# Patient Record
Sex: MALE | Race: WHITE | Employment: OTHER | URBAN - METROPOLITAN AREA
[De-identification: names, ages, dates, MRNs, and addresses within clinical notes are randomized per-mention and may not be internally consistent; named-entity substitution may affect disease eponyms.]

---

## 2017-05-10 PROBLEM — I25.5 ISCHEMIC CARDIOMYOPATHY: Status: ACTIVE | Noted: 2017-05-10

## 2017-07-20 ENCOUNTER — HOSPITAL ENCOUNTER (OUTPATIENT)
Dept: SURGERY | Age: 72
Discharge: HOME | End: 2017-07-20

## 2017-07-20 VITALS
HEIGHT: 70 IN | TEMPERATURE: 97.7 F | WEIGHT: 166 LBS | DIASTOLIC BLOOD PRESSURE: 84 MMHG | RESPIRATION RATE: 16 BRPM | BODY MASS INDEX: 23.77 KG/M2 | OXYGEN SATURATION: 100 % | SYSTOLIC BLOOD PRESSURE: 146 MMHG | HEART RATE: 60 BPM

## 2017-07-26 ENCOUNTER — ANESTHESIA EVENT (OUTPATIENT)
Dept: SURGERY | Age: 72
End: 2017-07-26
Payer: MEDICARE

## 2017-07-27 ENCOUNTER — ANESTHESIA (OUTPATIENT)
Dept: SURGERY | Age: 72
End: 2017-07-27
Payer: MEDICARE

## 2017-07-27 ENCOUNTER — HOSPITAL ENCOUNTER (OUTPATIENT)
Age: 72
Setting detail: OUTPATIENT SURGERY
Discharge: HOME | End: 2017-07-27
Attending: OPHTHALMOLOGY | Admitting: OPHTHALMOLOGY
Payer: MEDICARE

## 2017-07-27 VITALS
BODY MASS INDEX: 23.41 KG/M2 | OXYGEN SATURATION: 97 % | TEMPERATURE: 98.4 F | RESPIRATION RATE: 15 BRPM | SYSTOLIC BLOOD PRESSURE: 128 MMHG | HEART RATE: 58 BPM | DIASTOLIC BLOOD PRESSURE: 72 MMHG | WEIGHT: 163.13 LBS

## 2017-07-27 PROCEDURE — 76210000063 HC OR PH I REC FIRST 0.5 HR: Performed by: OPHTHALMOLOGY

## 2017-07-27 PROCEDURE — 74011000250 HC RX REV CODE- 250

## 2017-07-27 PROCEDURE — 76060000032 HC ANESTHESIA 0.5 TO 1 HR: Performed by: OPHTHALMOLOGY

## 2017-07-27 PROCEDURE — 77030018838 HC SOL IRR OPTH ALCN -B: Performed by: OPHTHALMOLOGY

## 2017-07-27 PROCEDURE — 77030026083 HC FCPS OPHTH GRSH DSP ALCN -C: Performed by: OPHTHALMOLOGY

## 2017-07-27 PROCEDURE — 77030006685 HC BLD OPHTH ALCN -B: Performed by: OPHTHALMOLOGY

## 2017-07-27 PROCEDURE — 76210000020 HC REC RM PH II FIRST 0.5 HR: Performed by: OPHTHALMOLOGY

## 2017-07-27 PROCEDURE — 76010000138 HC OR TIME 0.5 TO 1 HR: Performed by: OPHTHALMOLOGY

## 2017-07-27 PROCEDURE — 74011250636 HC RX REV CODE- 250/636

## 2017-07-27 PROCEDURE — 77030013372: Performed by: OPHTHALMOLOGY

## 2017-07-27 PROCEDURE — 74011000254 HC RX REV CODE- 254: Performed by: OPHTHALMOLOGY

## 2017-07-27 PROCEDURE — 77030018837 HC SOL IRR OPTH ALCN -A: Performed by: OPHTHALMOLOGY

## 2017-07-27 PROCEDURE — 74011250636 HC RX REV CODE- 250/636: Performed by: OPHTHALMOLOGY

## 2017-07-27 PROCEDURE — 74011250636 HC RX REV CODE- 250/636: Performed by: ANESTHESIOLOGY

## 2017-07-27 PROCEDURE — 74011000250 HC RX REV CODE- 250: Performed by: OPHTHALMOLOGY

## 2017-07-27 PROCEDURE — 77030032623 HC KT VITRCMY TOT PLS ALCN -F: Performed by: OPHTHALMOLOGY

## 2017-07-27 PROCEDURE — 77030008547 HC TBNG OPHTH BD -A: Performed by: OPHTHALMOLOGY

## 2017-07-27 PROCEDURE — 77030018846 HC SOL IRR STRL H20 ICUM -A: Performed by: OPHTHALMOLOGY

## 2017-07-27 RX ORDER — TETRACAINE HYDROCHLORIDE 5 MG/ML
SOLUTION OPHTHALMIC AS NEEDED
Status: DISCONTINUED | OUTPATIENT
Start: 2017-07-27 | End: 2017-07-27 | Stop reason: HOSPADM

## 2017-07-27 RX ORDER — BUPIVACAINE HYDROCHLORIDE 5 MG/ML
INJECTION, SOLUTION EPIDURAL; INTRACAUDAL AS NEEDED
Status: DISCONTINUED | OUTPATIENT
Start: 2017-07-27 | End: 2017-07-27 | Stop reason: HOSPADM

## 2017-07-27 RX ORDER — NALOXONE HYDROCHLORIDE 0.4 MG/ML
0.2 INJECTION, SOLUTION INTRAMUSCULAR; INTRAVENOUS; SUBCUTANEOUS AS NEEDED
Status: DISCONTINUED | OUTPATIENT
Start: 2017-07-27 | End: 2017-07-27 | Stop reason: HOSPADM

## 2017-07-27 RX ORDER — LIDOCAINE HYDROCHLORIDE 20 MG/ML
INJECTION, SOLUTION EPIDURAL; INFILTRATION; INTRACAUDAL; PERINEURAL AS NEEDED
Status: DISCONTINUED | OUTPATIENT
Start: 2017-07-27 | End: 2017-07-27 | Stop reason: HOSPADM

## 2017-07-27 RX ORDER — ONDANSETRON 2 MG/ML
INJECTION INTRAMUSCULAR; INTRAVENOUS AS NEEDED
Status: DISCONTINUED | OUTPATIENT
Start: 2017-07-27 | End: 2017-07-27 | Stop reason: HOSPADM

## 2017-07-27 RX ORDER — SODIUM CHLORIDE 9 MG/ML
INJECTION INTRAMUSCULAR; INTRAVENOUS; SUBCUTANEOUS AS NEEDED
Status: DISCONTINUED | OUTPATIENT
Start: 2017-07-27 | End: 2017-07-27 | Stop reason: HOSPADM

## 2017-07-27 RX ORDER — INDOCYANINE GREEN AND WATER 25 MG
KIT INJECTION AS NEEDED
Status: DISCONTINUED | OUTPATIENT
Start: 2017-07-27 | End: 2017-07-27 | Stop reason: HOSPADM

## 2017-07-27 RX ORDER — OXYCODONE HYDROCHLORIDE 5 MG/1
5 TABLET ORAL
Status: DISCONTINUED | OUTPATIENT
Start: 2017-07-27 | End: 2017-07-27 | Stop reason: HOSPADM

## 2017-07-27 RX ORDER — PHENYLEPHRINE HYDROCHLORIDE 25 MG/ML
1 SOLUTION/ DROPS OPHTHALMIC
Status: DISCONTINUED | OUTPATIENT
Start: 2017-07-27 | End: 2017-07-27 | Stop reason: HOSPADM

## 2017-07-27 RX ORDER — HYDROMORPHONE HYDROCHLORIDE 2 MG/ML
0.5 INJECTION, SOLUTION INTRAMUSCULAR; INTRAVENOUS; SUBCUTANEOUS
Status: DISCONTINUED | OUTPATIENT
Start: 2017-07-27 | End: 2017-07-27 | Stop reason: HOSPADM

## 2017-07-27 RX ORDER — SODIUM CHLORIDE, SODIUM LACTATE, POTASSIUM CHLORIDE, CALCIUM CHLORIDE 600; 310; 30; 20 MG/100ML; MG/100ML; MG/100ML; MG/100ML
75 INJECTION, SOLUTION INTRAVENOUS CONTINUOUS
Status: DISCONTINUED | OUTPATIENT
Start: 2017-07-27 | End: 2017-07-27 | Stop reason: HOSPADM

## 2017-07-27 RX ORDER — LIDOCAINE HYDROCHLORIDE 20 MG/ML
INJECTION, SOLUTION INFILTRATION; PERINEURAL AS NEEDED
Status: DISCONTINUED | OUTPATIENT
Start: 2017-07-27 | End: 2017-07-27 | Stop reason: HOSPADM

## 2017-07-27 RX ORDER — MIDAZOLAM HYDROCHLORIDE 1 MG/ML
2 INJECTION, SOLUTION INTRAMUSCULAR; INTRAVENOUS ONCE
Status: DISCONTINUED | OUTPATIENT
Start: 2017-07-27 | End: 2017-07-27 | Stop reason: HOSPADM

## 2017-07-27 RX ORDER — PROPOFOL 10 MG/ML
INJECTION, EMULSION INTRAVENOUS AS NEEDED
Status: DISCONTINUED | OUTPATIENT
Start: 2017-07-27 | End: 2017-07-27 | Stop reason: HOSPADM

## 2017-07-27 RX ORDER — CYCLOPENTOLATE HYDROCHLORIDE 20 MG/ML
1 SOLUTION/ DROPS OPHTHALMIC
Status: DISCONTINUED | OUTPATIENT
Start: 2017-07-27 | End: 2017-07-27 | Stop reason: HOSPADM

## 2017-07-27 RX ORDER — OFLOXACIN 3 MG/ML
1 SOLUTION/ DROPS OPHTHALMIC
Status: DISCONTINUED | OUTPATIENT
Start: 2017-07-27 | End: 2017-07-27 | Stop reason: HOSPADM

## 2017-07-27 RX ORDER — MIDAZOLAM HYDROCHLORIDE 1 MG/ML
2 INJECTION, SOLUTION INTRAMUSCULAR; INTRAVENOUS
Status: DISCONTINUED | OUTPATIENT
Start: 2017-07-27 | End: 2017-07-27 | Stop reason: HOSPADM

## 2017-07-27 RX ORDER — FENTANYL CITRATE 50 UG/ML
100 INJECTION, SOLUTION INTRAMUSCULAR; INTRAVENOUS ONCE
Status: DISCONTINUED | OUTPATIENT
Start: 2017-07-27 | End: 2017-07-27 | Stop reason: HOSPADM

## 2017-07-27 RX ORDER — BETAMETHASONE SODIUM PHOSPHATE AND BETAMETHASONE ACETATE 3; 3 MG/ML; MG/ML
INJECTION, SUSPENSION INTRA-ARTICULAR; INTRALESIONAL; INTRAMUSCULAR; SOFT TISSUE AS NEEDED
Status: DISCONTINUED | OUTPATIENT
Start: 2017-07-27 | End: 2017-07-27 | Stop reason: HOSPADM

## 2017-07-27 RX ORDER — LIDOCAINE HYDROCHLORIDE 10 MG/ML
0.1 INJECTION INFILTRATION; PERINEURAL AS NEEDED
Status: DISCONTINUED | OUTPATIENT
Start: 2017-07-27 | End: 2017-07-27 | Stop reason: HOSPADM

## 2017-07-27 RX ORDER — CEFAZOLIN SODIUM 1 G/3ML
INJECTION, POWDER, FOR SOLUTION INTRAMUSCULAR; INTRAVENOUS AS NEEDED
Status: DISCONTINUED | OUTPATIENT
Start: 2017-07-27 | End: 2017-07-27 | Stop reason: HOSPADM

## 2017-07-27 RX ADMIN — PROPOFOL 20 MG: 10 INJECTION, EMULSION INTRAVENOUS at 13:19

## 2017-07-27 RX ADMIN — CYCLOPENTOLATE HYDROCHLORIDE 1 DROP: 20 SOLUTION/ DROPS OPHTHALMIC at 10:52

## 2017-07-27 RX ADMIN — SODIUM CHLORIDE, SODIUM LACTATE, POTASSIUM CHLORIDE, AND CALCIUM CHLORIDE 75 ML/HR: 600; 310; 30; 20 INJECTION, SOLUTION INTRAVENOUS at 10:53

## 2017-07-27 RX ADMIN — ONDANSETRON 4 MG: 2 INJECTION INTRAMUSCULAR; INTRAVENOUS at 13:26

## 2017-07-27 RX ADMIN — PROPOFOL 30 MG: 10 INJECTION, EMULSION INTRAVENOUS at 13:17

## 2017-07-27 RX ADMIN — PHENYLEPHRINE HYDROCHLORIDE 1 DROP: 25 SOLUTION/ DROPS OPHTHALMIC at 10:52

## 2017-07-27 RX ADMIN — PROPOFOL 20 MG: 10 INJECTION, EMULSION INTRAVENOUS at 13:18

## 2017-07-27 RX ADMIN — LIDOCAINE HYDROCHLORIDE 20 MG: 20 INJECTION, SOLUTION EPIDURAL; INFILTRATION; INTRACAUDAL; PERINEURAL at 13:17

## 2017-07-27 NOTE — BRIEF OP NOTE
BRIEF OPERATIVE NOTE    Date of Procedure: 7/27/2017   Preoperative Diagnosis: Macular pucker, right [H35.371]  Postoperative Diagnosis: Macular pucker, right [H35.371]    Procedure(s):  RIGHT VITRECTOMY POSTERIOR 25 GAUGE/ SCAR TISSUE REMOVAL  Surgeon(s) and Role:     * Batsheva Castro MD - Primary         Assistant Staff:       Surgical Staff:  Circ-1: Raven Sarkar RN  Scrub Tech-2: Shivani Blake  Event Time In   Incision Start 1318   Incision Close 1357     Anesthesia: MAC   Estimated Blood Loss: 0  Specimens: * No specimens in log *   Findings: 0   Complications: 0  Implants: * No implants in log *

## 2017-07-27 NOTE — IP AVS SNAPSHOT
[address] [facility] 
 
 
 [address] 
[phone] Patient: Monserrat Griffith MRN: [id number] XTZ:4/32/2807 Current Discharge Medication List  
  
CONTINUE these medications which have CHANGED Dose & Instructions Dispensing Information Comments Morning Noon Evening Bedtime  
 atorvastatin 40 mg tablet Commonly known as:  LIPITOR What changed:  when to take this Your last dose was: Your next dose is:    
   
   
 Dose:  40 mg Take 1 Tab by mouth daily. Quantity:  90 Tab Refills:  3  
     
   
   
   
  
 losartan 25 mg tablet Commonly known as:  COZAAR What changed:  when to take this Your last dose was: Your next dose is:    
   
   
 Dose:  25 mg Take 1 Tab by mouth daily. Quantity:  90 Tab Refills:  3 CONTINUE these medications which have NOT CHANGED Dose & Instructions Dispensing Information Comments Morning Noon Evening Bedtime  
 aspirin delayed-release 81 mg tablet Your last dose was: Your next dose is:    
   
   
 Dose:  81 mg Take 81 mg by mouth every morning. Indications: continue Refills:  0  
     
   
   
   
  
 leflunomide 20 mg tablet Commonly known as:  Zaira Gallon Your last dose was: Your next dose is:    
   
   
 Dose:  20 mg Take 20 mg by mouth every morning. Indications: RHEUMATOID ARTHRITIS Refills:  0  
     
   
   
   
  
 metoprolol tartrate 25 mg tablet Commonly known as:  LOPRESSOR Your last dose was: Your next dose is:    
   
   
 Dose:  25 mg Take 1 Tab by mouth two (2) times a day. Quantity:  180 Tab Refills:  3  
     
   
   
   
  
 multivitamin tablet Commonly known as:  ONE A DAY Your last dose was: Your next dose is:    
   
   
 Dose:  1 Tab Take 1 Tab by mouth daily. Indications: hold until after surgery Refills:  0 nitroglycerin 0.4 mg SL tablet Commonly known as:  NITROSTAT Your last dose was: Your next dose is:    
   
   
 Dose:  0.4 mg  
1 Tab by SubLINGual route every five (5) minutes as needed for Chest Pain. Indications: bring on the dos. Quantity:  1 Bottle Refills:  11

## 2017-07-27 NOTE — OP NOTES
[facility] 64   OPERATIVE REPORT       Name:  Eliana Louis   MR#:  [id number]   :  1945   Account #:  [de-identified]   Date of Adm:  2017       DATE OF SURGERY: 2017    PREOPERATIVE DIAGNOSES   1. Epiretinal proliferation, visually significant, right eye. 2. Pseudophakia, right eye. POSTOPERATIVE DIAGNOSES   1. Epiretinal proliferation, visually significant, right eye. 2. Pseudophakia, right eye. PROCEDURE PERFORMED: Pars plana vitrectomy with stripping of   epiretinal proliferation, right eye. SURGEON: Yobani Freitas MD    ANESTHESIA: Modified. COMPLICATIONS: None. INDICATIONS FOR PROCEDURE: The patient has significant central   visual distortion and blur associated with a dense epiretinal   proliferation. Significant distortion was seen on the OCT   testing. He had been followed for a time and came in, wanting   something done for this. Understanding the risks, benefits, and   alternatives, he elected to proceed. SUMMARY OF THE OPERATION: After adequate pre-evaluation and   informed consent had been obtained, the patient was brought to   the suite. IV access and sedation were obtained without   complication. Modified Jerzy Millin and retrobulbar injection was   given to the right eye after verifying this as the operative   site and after a timeout had been performed. Once good anesthesia and akinesia were noted, the eye was   prepped and draped in the usual fashion for vitreoretinal   surgery. Lid speculum was placed in the floor of the eye. He   still had some movement and so I did place 3 mL of additional   sub-Tenon anesthetic inferonasally. Three separate 25-gauge   cannulas were then placed. Infusion was verified and turned to a   pressure of 35 mmHg. Through the 2 working sclerotomies, central   vitreous was removed. Once the vitreous removal had been carried   out into the anterior skirt, attention was turned to the macula.    A bent MVR blade was used to elevate the proliferation along the   infratemporal arcade vessel which was very undulating. Good   elevation of this membrane was achieved through the   papillomacular bundle then was stripped over the entire macula,   especially temporal to the papillomacular bundle, including the   fovea without significant evidence of retinal trauma. Some   surface proliferation remained in the PMB and inferior to the   arcade vessel. This was then removed as well. ICG dye showed   several small remnants. These were peeled and trimmed. No   significant scar remained at the end of this. I did not see any   significant ILM that required peel. I felt that this was   actually part of the epiretinal proliferation 0elevated, due to   the staining and appearance of the membrane. The periphery was   inspected. No breaks or tears were seen. It was felt this was an   excellent result. The pressure was turned to 20 and all cannulas   were removed. The eye remained pressurized at 20 mmHg;   therefore, subconjunctival Celestone and Ancef were given in the   sub-Tenon space.         MD Tima Dimas / Brenda Perry   D:  07/27/2017   14:08   T:  07/27/2017   14:29   Job #:  [id number]

## 2017-07-27 NOTE — IP AVS SNAPSHOT
[facility] 
 
 
 [address] [facility] 322 [facility] 
[phone] Patient: Fili Elkins MRN: [id number] NZL:6/00/3215 You are allergic to the following Allergen Reactions Equine Protein Unknown (comments) \"horse serum\" \"found in td\" Sulfa (Sulfonamide Antibiotics) Swelling Recent Documentation Weight BMI Smoking Status 74 kg 23.41 kg/m2 Never Smoker Emergency Contacts Name Discharge Info Relation Home Work Mobile Sharon Mcadams DISCHARGE CAREGIVER [3] Spouse [3] [phone] About your hospitalization You were admitted on:  July 27, 2017 You last received care in the:  [facility] OP PACU You were discharged on:  July 27, 2017 Unit phone number:  [phone] Why you were hospitalized Your primary diagnosis was:  Not on File Providers Seen During Your Hospitalizations Provider Role Specialty Primary office phone Todd Reyes MD Attending Provider Ophthalmology [phone] Your Primary Care Physician (PCP) Primary Care Physician Office Phone Office Fax Mae Ewing [phone] Follow-up Information Follow up With Details Comments Contact Info Diana Jaime MD   [address] [place] [address] Internal Medicine [facility] [address] 
[phone] Current Discharge Medication List  
  
CONTINUE these medications which have CHANGED Dose & Instructions Dispensing Information Comments Morning Noon Evening Bedtime  
 atorvastatin 40 mg tablet Commonly known as:  LIPITOR What changed:  when to take this Your last dose was: Your next dose is:    
   
   
 Dose:  40 mg Take 1 Tab by mouth daily. Quantity:  90 Tab Refills:  3  
     
   
   
   
  
 losartan 25 mg tablet Commonly known as:  COZAAR What changed:  when to take this Your last dose was: Your next dose is:    
   
   
 Dose:  25 mg Take 1 Tab by mouth daily. Quantity:  90 Tab Refills:  3 CONTINUE these medications which have NOT CHANGED Dose & Instructions Dispensing Information Comments Morning Noon Evening Bedtime  
 aspirin delayed-release 81 mg tablet Your last dose was: Your next dose is:    
   
   
 Dose:  81 mg Take 81 mg by mouth every morning. Indications: continue Refills:  0  
     
   
   
   
  
 leflunomide 20 mg tablet Commonly known as:  Adrian Montecito Your last dose was: Your next dose is:    
   
   
 Dose:  20 mg Take 20 mg by mouth every morning. Indications: RHEUMATOID ARTHRITIS Refills:  0  
     
   
   
   
  
 metoprolol tartrate 25 mg tablet Commonly known as:  LOPRESSOR Your last dose was: Your next dose is:    
   
   
 Dose:  25 mg Take 1 Tab by mouth two (2) times a day. Quantity:  180 Tab Refills:  3  
     
   
   
   
  
 multivitamin tablet Commonly known as:  ONE A DAY Your last dose was: Your next dose is:    
   
   
 Dose:  1 Tab Take 1 Tab by mouth daily. Indications: hold until after surgery Refills:  0  
     
   
   
   
  
 nitroglycerin 0.4 mg SL tablet Commonly known as:  NITROSTAT Your last dose was: Your next dose is:    
   
   
 Dose:  0.4 mg  
1 Tab by SubLINGual route every five (5) minutes as needed for Chest Pain. Indications: bring on the dos. Quantity:  1 Bottle Refills:  11 Discharge Instructions ACTIVITY · As tolerated and as directed by your doctor. · Bathe or shower as directed by your doctor. DIET · Clear liquids until no nausea or vomiting; then light diet for the first day. · Advance to regular diet on second day, unless your doctor orders otherwise. · If nausea and vomiting continues, call your doctor.   
 
PAIN 
 · Take pain medication as directed by your doctor. · Call your doctor if pain is NOT relieved by medication. · DO NOT take aspirin of blood thinners unless directed by your doctor. DRESSING CARE  
 
 
CALL YOUR DOCTOR IF  
· Excessive bleeding that does not stop after holding pressure over the area · Temperature of 101 degrees F or above · Excessive redness, swelling or bruising, and/ or green or yellow, smelly discharge from incision AFTER ANESTHESIA · For the first 24 hours: DO NOT Drive, Drink alcoholic beverages, or Make important decisions. · Be aware of dizziness following anesthesia and while taking pain medication. APPOINTMENT DATE/ TIME 
 
YOUR DOCTOR'S PHONE NUMBER  
 
 
DISCHARGE SUMMARY from Nurse PATIENT INSTRUCTIONS: 
 
After general anesthesia or intravenous sedation, for 24 hours or while taking prescription Narcotics: · Limit your activities · Do not drive and operate hazardous machinery · Do not make important personal or business decisions · Do  not drink alcoholic beverages · If you have not urinated within 8 hours after discharge, please contact your surgeon on call. *  Please give a list of your current medications to your Primary Care Provider. *  Please update this list whenever your medications are discontinued, doses are 
    changed, or new medications (including over-the-counter products) are added. *  Please carry medication information at all times in case of emergency situations. These are general instructions for a healthy lifestyle: No smoking/ No tobacco products/ Avoid exposure to second hand smoke Surgeon General's Warning:  Quitting smoking now greatly reduces serious risk to your health. Obesity, smoking, and sedentary lifestyle greatly increases your risk for illness A healthy diet, regular physical exercise & weight monitoring are important for maintaining a healthy lifestyle You may be retaining fluid if you have a history of heart failure or if you experience any of the following symptoms:  Weight gain of 3 pounds or more overnight or 5 pounds in a week, increased swelling in our hands or feet or shortness of breath while lying flat in bed. Please call your doctor as soon as you notice any of these symptoms; do not wait until your next office visit. Recognize signs and symptoms of STROKE: 
 
F-face looks uneven A-arms unable to move or move unevenly S-speech slurred or non-existent T-time-call 911 as soon as signs and symptoms begin-DO NOT go Back to bed or wait to see if you get better-TIME IS BRAIN. Discharge Orders None Introducing [facility] & HEALTH SERVICES! [facility] introduces [employer] patient portal. Now you can access parts of your medical record, email your doctor's office, and request medication refills online. 1. In your internet browser, go to [email or web address] 2. Click on the First Time User? Click Here link in the Sign In box. You will see the New Member Sign Up page. 3. Enter your [employer] Access Code exactly as it appears below. You will not need to use this code after youve completed the sign-up process. If you do not sign up before the expiration date, you must request a new code. · [employer] Access Code: [email or web address] Expires: 8/8/2017  8:17 AM 
 
4. Enter the last four digits of your Social Security Number (xxxx) and Date of Birth (mm/dd/yyyy) as indicated and click Submit. You will be taken to the next sign-up page. 5. Create a [employer] ID. This will be your [employer] login ID and cannot be changed, so think of one that is secure and easy to remember. 6. Create a [employer] password. You can change your password at any time. 7. Enter your Password Reset Question and Answer. This can be used at a later time if you forget your password. 8. Enter your e-mail address.  You will receive e-mail notification when new information is available in [employer]. 9. Click Sign Up. You can now view and download portions of your medical record. 10. Click the Download Summary menu link to download a portable copy of your medical information. If you have questions, please visit the Frequently Asked Questions section of the [employer] website. Remember, [employer] is NOT to be used for urgent needs. For medical emergencies, dial 911. Now available from your iPhone and Android! General Information Please provide this summary of care documentation to your next provider. Patient Signature:  ____________________________________________________________ Date:  ____________________________________________________________  
  
[facility] Provider Signature:  ____________________________________________________________ Date:  ____________________________________________________________

## 2017-07-27 NOTE — ANESTHESIA POSTPROCEDURE EVALUATION
Post-Anesthesia Evaluation and Assessment    Patient: Veronique Torres MRN: [id number]  SSN: [id number]    YOB: 1945  Age: 67 y.o. Sex: male       Cardiovascular Function/Vital Signs  Visit Vitals    /77    Pulse (!) 41    Temp 36.9 °C (98.4 °F)    Resp 26    Wt 74 kg (163 lb 2 oz)    SpO2 97%    BMI 23.41 kg/m2       Patient is status post total IV anesthesia anesthesia for Procedure(s):  RIGHT VITRECTOMY POSTERIOR 25 GAUGE/ SCAR TISSUE REMOVAL. Nausea/Vomiting: None    Postoperative hydration reviewed and adequate. Pain:  Pain Scale 1: Numeric (0 - 10) (07/27/17 1058)  Pain Intensity 1: 0 (07/27/17 1058)   Managed    Neurological Status:   Neuro (WDL): Within Defined Limits (07/27/17 1100)   At baseline    Mental Status and Level of Consciousness: Arousable    Pulmonary Status:   O2 Device: Room air (07/27/17 1403)   Adequate oxygenation and airway patent    Complications related to anesthesia: None    Post-anesthesia assessment completed. No concerns. Pt doing well.      Signed By: Lux Fraga MD     July 27, 2017

## 2017-07-27 NOTE — ANESTHESIA PREPROCEDURE EVALUATION
Anesthetic History   No history of anesthetic complications            Review of Systems / Medical History  Patient summary reviewed and pertinent labs reviewed    Pulmonary        Sleep apnea: BiPAP           Neuro/Psych              Cardiovascular    Hypertension: well controlled          CAD, CABG (2014) and hyperlipidemia    Exercise tolerance: >4 METS  Comments: Denies CP, SOB or changes in functional status  EF 40-45%      GI/Hepatic/Renal     GERD: well controlled           Endo/Other      Hypothyroidism: well controlled  Arthritis     Other Findings              Physical Exam    Airway  Mallampati: II  TM Distance: 4 - 6 cm  Neck ROM: normal range of motion   Mouth opening: Normal     Cardiovascular    Rhythm: regular  Rate: normal         Dental    Dentition: Caps/crowns     Pulmonary  Breath sounds clear to auscultation               Abdominal  GI exam deferred       Other Findings            Anesthetic Plan    ASA: 3  Anesthesia type: total IV anesthesia          Induction: Intravenous  Anesthetic plan and risks discussed with: Patient and Spouse      Pt took ASA and Metoprolol today.

## 2017-11-08 ENCOUNTER — HOSPITAL ENCOUNTER (OUTPATIENT)
Dept: LAB | Age: 72
Discharge: HOME | End: 2017-11-08
Payer: MEDICARE

## 2017-11-08 DIAGNOSIS — E78.2 MIXED HYPERLIPIDEMIA: Chronic | ICD-10-CM

## 2017-11-08 LAB
ALBUMIN SERPL-MCNC: 3.7 G/DL (ref 3.2–4.6)
ALBUMIN/GLOB SERPL: 0.9 {RATIO}
ALP SERPL-CCNC: 54 U/L (ref 50–136)
ALT SERPL-CCNC: 33 U/L (ref 12–65)
AST SERPL-CCNC: 25 U/L (ref 15–37)
BILIRUB DIRECT SERPL-MCNC: 0.2 MG/DL
BILIRUB SERPL-MCNC: 0.7 MG/DL (ref 0.2–1.1)
CHOLEST SERPL-MCNC: 140 MG/DL
GLOBULIN SER CALC-MCNC: 4 G/DL
HDLC SERPL-MCNC: 54 MG/DL (ref 40–60)
HDLC SERPL: 2.6 {RATIO}
LDLC SERPL CALC-MCNC: 73.2 MG/DL
LIPID PROFILE,FLP: NORMAL
PROT SERPL-MCNC: 7.7 G/DL (ref 6.3–8.2)
TRIGL SERPL-MCNC: 64 MG/DL (ref 35–150)
VLDLC SERPL CALC-MCNC: 12.8 MG/DL (ref 6–23)

## 2017-11-08 PROCEDURE — 80076 HEPATIC FUNCTION PANEL: CPT | Performed by: INTERNAL MEDICINE

## 2017-11-08 PROCEDURE — 36415 COLL VENOUS BLD VENIPUNCTURE: CPT | Performed by: INTERNAL MEDICINE

## 2017-11-08 PROCEDURE — 80061 LIPID PANEL: CPT | Performed by: INTERNAL MEDICINE

## 2018-05-17 ENCOUNTER — HOSPITAL ENCOUNTER (OUTPATIENT)
Dept: LAB | Age: 73
Discharge: HOME | End: 2018-05-17
Payer: MEDICARE

## 2018-05-17 DIAGNOSIS — I25.10 CORONARY ARTERY DISEASE INVOLVING NATIVE CORONARY ARTERY OF NATIVE HEART WITHOUT ANGINA PECTORIS: Chronic | ICD-10-CM

## 2018-05-17 DIAGNOSIS — I50.43 ACUTE ON CHRONIC COMBINED SYSTOLIC AND DIASTOLIC ACC/AHA STAGE C CONGESTIVE HEART FAILURE (HCC): ICD-10-CM

## 2018-05-17 DIAGNOSIS — I25.5 ISCHEMIC CARDIOMYOPATHY: ICD-10-CM

## 2018-05-17 LAB
ANION GAP SERPL CALC-SCNC: 7 MMOL/L
BASOPHILS # BLD: 0 K/UL (ref 0–0.2)
BASOPHILS NFR BLD: 1 % (ref 0–2)
BUN SERPL-MCNC: 14 MG/DL (ref 8–23)
CALCIUM SERPL-MCNC: 8.6 MG/DL (ref 8.3–10.4)
CHLORIDE SERPL-SCNC: 110 MMOL/L (ref 98–107)
CO2 SERPL-SCNC: 27 MMOL/L (ref 21–32)
CREAT SERPL-MCNC: 0.9 MG/DL (ref 0.8–1.5)
DIFFERENTIAL METHOD BLD: ABNORMAL
EOSINOPHIL # BLD: 0.4 K/UL (ref 0–0.8)
EOSINOPHIL NFR BLD: 7 % (ref 0.5–7.8)
ERYTHROCYTE [DISTWIDTH] IN BLOOD BY AUTOMATED COUNT: 12.7 % (ref 11.9–14.6)
GLUCOSE SERPL-MCNC: 91 MG/DL (ref 65–100)
HCT VFR BLD AUTO: 41.8 % (ref 41.1–50.3)
HGB BLD-MCNC: 14.3 G/DL (ref 13.6–17.2)
LYMPHOCYTES # BLD: 1.4 K/UL (ref 0.5–4.6)
LYMPHOCYTES NFR BLD: 26 % (ref 13–44)
MCH RBC QN AUTO: 32.4 PG (ref 26.1–32.9)
MCHC RBC AUTO-ENTMCNC: 34.2 G/DL (ref 31.4–35)
MCV RBC AUTO: 94.8 FL (ref 79.6–97.8)
MONOCYTES # BLD: 0.7 K/UL (ref 0.1–1.3)
MONOCYTES NFR BLD: 14 % (ref 4–12)
NEUTS SEG # BLD: 2.7 K/UL (ref 1.7–8.2)
NEUTS SEG NFR BLD: 52 % (ref 43–78)
PLATELET # BLD AUTO: 163 K/UL (ref 150–450)
PMV BLD AUTO: 8.3 FL (ref 10.8–14.1)
POTASSIUM SERPL-SCNC: 4.2 MMOL/L (ref 3.5–5.1)
RBC # BLD AUTO: 4.41 M/UL (ref 4.23–5.67)
SODIUM SERPL-SCNC: 144 MMOL/L (ref 136–145)
TSH SERPL DL<=0.005 MIU/L-ACNC: 4.23 UIU/ML (ref 0.36–3.74)
WBC # BLD AUTO: 5.1 K/UL (ref 4.3–11.1)

## 2018-05-17 PROCEDURE — 84443 ASSAY THYROID STIM HORMONE: CPT | Performed by: INTERNAL MEDICINE

## 2018-05-17 PROCEDURE — 36415 COLL VENOUS BLD VENIPUNCTURE: CPT | Performed by: INTERNAL MEDICINE

## 2018-05-17 PROCEDURE — 80048 BASIC METABOLIC PNL TOTAL CA: CPT | Performed by: INTERNAL MEDICINE

## 2018-05-17 PROCEDURE — 84439 ASSAY OF FREE THYROXINE: CPT | Performed by: INTERNAL MEDICINE

## 2018-05-17 PROCEDURE — 85025 COMPLETE CBC W/AUTO DIFF WBC: CPT | Performed by: INTERNAL MEDICINE

## 2018-05-18 LAB — T4 FREE SERPL-MCNC: 0.8 NG/DL (ref 0.78–1.46)

## 2018-05-22 NOTE — PROGRESS NOTES
Called & pt asked to come in at earlier time for procedure at 9:30 with arrival at 7:30am. Pt agreeable to earlier time, with arrival between 7:30 & 8am.

## 2018-05-22 NOTE — PROGRESS NOTES
Patient pre-assessment complete for [facility] poss with Dr Ra Pittman scheduled for 18 at 3:30pm, arrival time 1:30pm. Patient verified using . Patient instructed to bring all home medications in labeled bottles on the day of procedure. NPO status reinforced. Patient informed to take a full dose aspirin 325mg  or 81 mg x 4 on the day of procedure. Patient instructed to HOLD spironolactone in am. Instructed they can take all other medications excluding vitamins & supplements. Patient verbalizes understanding of all instructions & denies any questions at this time.

## 2018-05-23 ENCOUNTER — HOSPITAL ENCOUNTER (OUTPATIENT)
Dept: CARDIAC CATH/INVASIVE PROCEDURES | Age: 73
Discharge: HOME | End: 2018-05-23
Attending: INTERNAL MEDICINE | Admitting: INTERNAL MEDICINE
Payer: MEDICARE

## 2018-05-23 VITALS
RESPIRATION RATE: 18 BRPM | SYSTOLIC BLOOD PRESSURE: 119 MMHG | HEART RATE: 60 BPM | WEIGHT: 162 LBS | HEIGHT: 70 IN | DIASTOLIC BLOOD PRESSURE: 70 MMHG | BODY MASS INDEX: 23.19 KG/M2 | OXYGEN SATURATION: 99 %

## 2018-05-23 LAB
ATRIAL RATE: 58 BPM
CALCULATED P AXIS, ECG09: 41 DEGREES
CALCULATED R AXIS, ECG10: -43 DEGREES
CALCULATED T AXIS, ECG11: 11 DEGREES
DIAGNOSIS, 93000: NORMAL
P-R INTERVAL, ECG05: 200 MS
Q-T INTERVAL, ECG07: 460 MS
QRS DURATION, ECG06: 160 MS
QTC CALCULATION (BEZET), ECG08: 451 MS
VENTRICULAR RATE, ECG03: 58 BPM

## 2018-05-23 PROCEDURE — 93005 ELECTROCARDIOGRAM TRACING: CPT | Performed by: INTERNAL MEDICINE

## 2018-05-23 PROCEDURE — 74011250636 HC RX REV CODE- 250/636: Performed by: INTERNAL MEDICINE

## 2018-05-23 PROCEDURE — C1769 GUIDE WIRE: HCPCS

## 2018-05-23 PROCEDURE — 74011636320 HC RX REV CODE- 636/320: Performed by: INTERNAL MEDICINE

## 2018-05-23 PROCEDURE — 99152 MOD SED SAME PHYS/QHP 5/>YRS: CPT

## 2018-05-23 PROCEDURE — 93461 R&L HRT ART/VENTRICLE ANGIO: CPT

## 2018-05-23 PROCEDURE — 74011250636 HC RX REV CODE- 250/636

## 2018-05-23 PROCEDURE — 77030004534 HC CATH ANGI DX INFN CARD -A

## 2018-05-23 PROCEDURE — C1894 INTRO/SHEATH, NON-LASER: HCPCS

## 2018-05-23 PROCEDURE — 99153 MOD SED SAME PHYS/QHP EA: CPT

## 2018-05-23 PROCEDURE — C1760 CLOSURE DEV, VASC: HCPCS

## 2018-05-23 PROCEDURE — 93459 L HRT ART/GRFT ANGIO: CPT

## 2018-05-23 PROCEDURE — 74011000250 HC RX REV CODE- 250: Performed by: INTERNAL MEDICINE

## 2018-05-23 RX ORDER — GUAIFENESIN 100 MG/5ML
81-324 LIQUID (ML) ORAL ONCE
Status: DISCONTINUED | OUTPATIENT
Start: 2018-05-23 | End: 2018-05-23 | Stop reason: HOSPADM

## 2018-05-23 RX ORDER — MIDAZOLAM HYDROCHLORIDE 1 MG/ML
.5-2 INJECTION, SOLUTION INTRAMUSCULAR; INTRAVENOUS
Status: DISCONTINUED | OUTPATIENT
Start: 2018-05-23 | End: 2018-05-23 | Stop reason: HOSPADM

## 2018-05-23 RX ORDER — SODIUM CHLORIDE 0.9 % (FLUSH) 0.9 %
5-10 SYRINGE (ML) INJECTION EVERY 8 HOURS
Status: CANCELLED | OUTPATIENT
Start: 2018-05-23

## 2018-05-23 RX ORDER — HEPARIN SODIUM 200 [USP'U]/100ML
3 INJECTION, SOLUTION INTRAVENOUS CONTINUOUS
Status: DISCONTINUED | OUTPATIENT
Start: 2018-05-23 | End: 2018-05-23 | Stop reason: HOSPADM

## 2018-05-23 RX ORDER — SODIUM CHLORIDE 0.9 % (FLUSH) 0.9 %
5-10 SYRINGE (ML) INJECTION AS NEEDED
Status: CANCELLED | OUTPATIENT
Start: 2018-05-23

## 2018-05-23 RX ORDER — SODIUM CHLORIDE 9 MG/ML
75 INJECTION, SOLUTION INTRAVENOUS CONTINUOUS
Status: DISCONTINUED | OUTPATIENT
Start: 2018-05-23 | End: 2018-05-23 | Stop reason: HOSPADM

## 2018-05-23 RX ORDER — SODIUM CHLORIDE 9 MG/ML
75 INJECTION, SOLUTION INTRAVENOUS CONTINUOUS
Status: CANCELLED | OUTPATIENT
Start: 2018-05-23

## 2018-05-23 RX ORDER — DIAZEPAM 5 MG/1
5 TABLET ORAL ONCE
Status: DISCONTINUED | OUTPATIENT
Start: 2018-05-23 | End: 2018-05-23 | Stop reason: HOSPADM

## 2018-05-23 RX ORDER — LIDOCAINE HYDROCHLORIDE 20 MG/ML
60-140 INJECTION, SOLUTION INFILTRATION; PERINEURAL ONCE
Status: COMPLETED | OUTPATIENT
Start: 2018-05-23 | End: 2018-05-23

## 2018-05-23 RX ADMIN — IOPAMIDOL 130 ML: 755 INJECTION, SOLUTION INTRAVENOUS at 10:48

## 2018-05-23 RX ADMIN — HEPARIN SODIUM 3 ML/HR: 5000 INJECTION, SOLUTION INTRAVENOUS; SUBCUTANEOUS at 10:21

## 2018-05-23 RX ADMIN — LIDOCAINE HYDROCHLORIDE 80 MG: 20 INJECTION, SOLUTION INFILTRATION; PERINEURAL at 10:30

## 2018-05-23 RX ADMIN — MIDAZOLAM HYDROCHLORIDE 2 MG: 1 INJECTION, SOLUTION INTRAMUSCULAR; INTRAVENOUS at 10:29

## 2018-05-23 NOTE — IP AVS SNAPSHOT
303 [facility] 
 
 
 2[address] [facility] 322 [facility] 
[phone] Patient: Calli Currie MRN: [id number] QVU:1/99/2621 Discharge Summary 5/23/2018 Calli Currie MRN[de-identified]  [id number] Admission Information Provider Pager Service Admission Date Expected D/C Date Gigi Harvey MD  CARDIAC CATH LAB 5/23/2018 Actual LOS Patient Class 0 days OUTPATIENT Follow-up Information Follow up With Details Comments Contact Info Geno Benson MD On 6/12/2018 at 10:00am in the [place] office, for heart cath follow-up [address] [facility] [address] 
[phone] My Medications ASK your physician about these medications Instructions Each Dose to Equal  
 Morning Noon Evening Bedtime  
 aspirin delayed-release 81 mg tablet Your last dose was: Your next dose is: Take 81 mg by mouth every morning. Indications: continue 81 mg  
    
   
   
   
  
 atorvastatin 40 mg tablet Commonly known as:  LIPITOR Your last dose was: Your next dose is: Take 1 Tab by mouth daily. 40 mg  
    
   
   
   
  
 leflunomide 20 mg tablet Commonly known as:  Claribel Kicks Your last dose was: Your next dose is: Take 20 mg by mouth every morning. Indications: RHEUMATOID ARTHRITIS  
 20 mg  
    
   
   
   
  
 losartan 25 mg tablet Commonly known as:  COZAAR Your last dose was: Your next dose is: Take 1 Tab by mouth daily. 25 mg  
    
   
   
   
  
 metoprolol tartrate 25 mg tablet Commonly known as:  LOPRESSOR Your last dose was: Your next dose is: Take 1 Tab by mouth two (2) times a day. 25 mg  
    
   
   
   
  
 multivitamin tablet Commonly known as:  ONE A DAY Your last dose was: Your next dose is: Take 1 Tab by mouth daily. Indications: hold until after surgery 1 Tab  
    
   
   
   
  
 nitroglycerin 0.4 mg SL tablet Commonly known as:  NITROSTAT Your last dose was: Your next dose is:    
   
   
 1 Tab by SubLINGual route every five (5) minutes as needed for Chest Pain. Indications: bring on the dos. 0.4 mg  
    
   
   
   
  
 spironolactone 25 mg tablet Commonly known as:  ALDACTONE Your last dose was: Your next dose is:    
   
   
 1/2 tablet daily General Information Please provide this summary of care documentation to your next provider. Allergies Unspecified:  Equine Protein;  Sulfa (Sulfonamide Antibiotics) Current Immunizations  Reviewed on 1/2/2015 Name Date DTAP Vaccine 6/7/2010 TB Skin Test (PPD) Intradermal 6/4/2014 Discharge Instructions Discharge Instructions HEART CATHETERIZATION/ANGIOGRAPHY DISCHARGE INSTRUCTIONS 1. Check puncture site frequently for swelling or bleeding. If there is any bleeding, lie down and apply pressure over the area with a clean towel or washcloth. Notify your doctor for any redness, swelling, drainage, or oozing from the puncture site. Notify your doctor for any fever or chills. 2. If the extremity becomes cold, numb, or painful call [address] Cardiology at [phone]. 
3. Activity should be limited for the next 48 hours. Climb stairs as little as possible and avoid any stooping, bending, or strenuous activity for 48 hours. No heavy lifting (anything over 10 pounds) for 3 days. 4. You may resume your usual diet. Drink more fluids than usual. 
5. Have a responsible person drive you home and stay with you for at least 24 hours after your heart catheterization/angiography. 6. You may remove bandage from your Right groin in 24 hours. You may shower in 24 hours. No tub baths, hot tubs, or swimming for 1 week.  Do not place any lotions, creams, powders, or ointments over puncture site for 1 week. You may place a clean band-aid over the puncture site each day for 5 days. Change daily. I have read the above instructions and have had the opportunity to ask questions. Discharge Orders None  
  
` Patient Signature:  ____________________________________________________________ Date:  ____________________________________________________________  
  
 Sonja Lambing Provider Signature:  ____________________________________________________________ Date:  ____________________________________________________________

## 2018-05-23 NOTE — PROGRESS NOTES
TRANSFER - OUT REPORT:    R femoral diagnostic [facility] with Dr Verenice Cee 2mg  Angioseal to site  No bleeding or hematoma noted at site. Site soft    Verbal report given to Priyanka(name) on Joe Torres  being transferred to CPRU(unit) for routine progression of care       Report consisted of patients Situation, Background, Assessment and   Recommendations(SBAR). Information from the following report(s) Procedure Summary was reviewed with the receiving nurse. Lines:   Peripheral IV 05/23/18 Right Antecubital (Active)       Peripheral IV 05/23/18 Left Antecubital (Active)        Opportunity for questions and clarification was provided.       Patient transported with:   Registered Nurse

## 2018-05-23 NOTE — PROGRESS NOTES
Discharge instructions given per orders, voiced good understanding of post cath care, medications & follow up care.  Denies any questions

## 2018-05-23 NOTE — PROCEDURES
Cath    lvef 20  Lm ok  Lad very distal 95% at apex  circ severe disease  rca occluded  Sv to pda occluded  Sv to om patent but diseased  Sv to diag patent but disease  Lima atretic    angioseal

## 2018-05-23 NOTE — PROGRESS NOTES
Report received from [address] [place] [address]. Procedural findings communicated. Intra procedural  medication administration reviewed. Progression of care discussed.      Patient received into [address] [facility] 3 post sheath removal.     Access site without bleeding or swelling     Dressing dry and intact     Patient instructed to limit movement to right upper extremity    Routine post procedural vital signs and site assessment initiated

## 2018-05-23 NOTE — DISCHARGE INSTRUCTIONS
HEART CATHETERIZATION/ANGIOGRAPHY DISCHARGE INSTRUCTIONS    1. Check puncture site frequently for swelling or bleeding. If there is any bleeding, lie down and apply pressure over the area with a clean towel or washcloth. Notify your doctor for any redness, swelling, drainage, or oozing from the puncture site. Notify your doctor for any fever or chills. 2. If the extremity becomes cold, numb, or painful call [facility] at [phone].  3. Activity should be limited for the next 48 hours. Climb stairs as little as possible and avoid any stooping, bending, or strenuous activity for 48 hours. No heavy lifting (anything over 10 pounds) for 3 days. 4. You may resume your usual diet. Drink more fluids than usual.  5. Have a responsible person drive you home and stay with you for at least 24 hours after your heart catheterization/angiography. 6. You may remove bandage from your Right groin in 24 hours. You may shower in 24 hours. No tub baths, hot tubs, or swimming for 1 week. Do not place any lotions, creams, powders, or ointments over puncture site for 1 week. You may place a clean band-aid over the puncture site each day for 5 days. Change daily. I have read the above instructions and have had the opportunity to ask questions.

## 2018-05-23 NOTE — PROGRESS NOTES
Patient received to [address] [place] [address] room # 11  Ambulatory from [facility]. Patient scheduled for [facility] today with Dr Kunz Husbands. Procedure reviewed & questions answered, voiced good understanding consent obtained & placed on chart. All medications and medical history reviewed. Will prep patient per orders. Patient & family updated on plan of care. The patient has a fraility score of 3-MANAGING WELL, based on patient's ability to perform ADL's independently, patient A&Ox3, patient able to ambulate to room without difficulty.

## 2018-05-23 NOTE — PROCEDURES
[address] CATH    Lilian Hilton  MR#: [id number]  : 1945  ACCOUNT #: [de-identified]   DATE OF SERVICE: 2018    PROCEDURE PERFORMED:  Left heart cath, selective coronary angiography, left ventriculogram from a right femoral aspect with saphenous vein angiography and LIMA angiography. REFERRING PHYSICIAN:  Zuly Whiteside MD      TIME:  10:30 to 10:49      ANESTHESIA:  2 mg of Versed were given. PRESSURES:  Aortic pressure 131/76. LVEDP 17. CONTRAST:  130 mL. CATHETERS USED:  No left 4, No right 4, straight pigtail and LIMA catheters were used. FINDINGS:  Ventriculogram done in [place] projection shows severe reduction in EF estimated at 20% with inferior wall akinesis and severe hypokinesis in the other walls. There is no gradient on pullback. LVEDP is elevated. Left main arises normally, divides into LAD and circumflex. Left main has no significant disease. LAD courses to the apex. The LAD has diffuse mild disease throughout with an apical 95% stenosis. There are 2 diagonals off the proximal LAD. Both of these are occluded. AV groove circ has multiple areas of severe 95% stenosis. There appears to be at least 2 occluded OMs off of the circumflex. There is backfilling of an occluded PDA from the circumflex. RCA is occluded proximally. GRAFT ANATOMY:  As follows:  1. LIMA to the LAD is atretic with no significant filling of the native vessel through the graft. This is because of the patency of the native proximal through mid distal LAD. 2.  Saphenous vein bypass graft to the diagonal is patent, but is moderately diseased in a nonobstructive manner with diffuse 30% plaquing. The diagonal was small with acceptable runoff. 3.  Saphenous vein bypass graft to the OM is patent, again with moderate diffuse 30% plaquing throughout the vein graft and acceptable runoff into an OM system.   4.  Saphenous vein bypass graft to the PDA is occluded. CONCLUSION:    1. Coronary artery disease with severe ischemic cardiomyopathy, EF is 20% with wall motion abnormalities. The LIMA to the LAD is atretic. 2.  Vein graft to a diagonal was diseased, but patent. 3.  Vein graft to an OM is diseased, but patent. 4.  Vein graft to the PDA is occluded. The patient was angiosealed without difficulty. Aggressive medical therapy is warranted.       MD TAMIE Rivera /   D: 05/23/2018 11:06     T: 05/23/2018 11:52  JOB #: [id number]

## 2018-08-27 ENCOUNTER — HOSPITAL ENCOUNTER (OUTPATIENT)
Dept: LAB | Age: 73
Discharge: HOME | End: 2018-08-27
Attending: INTERNAL MEDICINE
Payer: MEDICARE

## 2018-08-27 DIAGNOSIS — I44.7 LBBB (LEFT BUNDLE BRANCH BLOCK): ICD-10-CM

## 2018-08-27 DIAGNOSIS — I25.5 ISCHEMIC CARDIOMYOPATHY: ICD-10-CM

## 2018-08-27 LAB
ANION GAP SERPL CALC-SCNC: 10 MMOL/L
BASOPHILS # BLD: 0 K/UL (ref 0–0.2)
BASOPHILS NFR BLD: 1 % (ref 0–2)
BUN SERPL-MCNC: 20 MG/DL (ref 8–23)
CALCIUM SERPL-MCNC: 8.5 MG/DL (ref 8.3–10.4)
CHLORIDE SERPL-SCNC: 107 MMOL/L (ref 98–107)
CO2 SERPL-SCNC: 25 MMOL/L (ref 21–32)
CREAT SERPL-MCNC: 1 MG/DL (ref 0.8–1.5)
DIFFERENTIAL METHOD BLD: ABNORMAL
EOSINOPHIL # BLD: 0.3 K/UL (ref 0–0.8)
EOSINOPHIL NFR BLD: 6 % (ref 0.5–7.8)
ERYTHROCYTE [DISTWIDTH] IN BLOOD BY AUTOMATED COUNT: 13 %
GLUCOSE SERPL-MCNC: 92 MG/DL (ref 65–100)
HCT VFR BLD AUTO: 41.5 % (ref 41.1–50.3)
HGB BLD-MCNC: 14.1 G/DL (ref 13.6–17.2)
IMM GRANULOCYTES # BLD: 0 K/UL (ref 0–0.5)
IMM GRANULOCYTES NFR BLD AUTO: 0 % (ref 0–5)
LYMPHOCYTES # BLD: 1.8 K/UL (ref 0.5–4.6)
LYMPHOCYTES NFR BLD: 35 % (ref 13–44)
MAGNESIUM SERPL-MCNC: 1.7 MG/DL (ref 1.8–2.4)
MCH RBC QN AUTO: 31.9 PG (ref 26.1–32.9)
MCHC RBC AUTO-ENTMCNC: 34 G/DL (ref 31.4–35)
MCV RBC AUTO: 93.9 FL (ref 79.6–97.8)
MONOCYTES # BLD: 0.7 K/UL (ref 0.1–1.3)
MONOCYTES NFR BLD: 13 % (ref 4–12)
NEUTS SEG # BLD: 2.4 K/UL (ref 1.7–8.2)
NEUTS SEG NFR BLD: 46 % (ref 43–78)
NRBC # BLD: 0 K/UL (ref 0–0.2)
PLATELET # BLD AUTO: 162 K/UL (ref 150–450)
PMV BLD AUTO: 8.5 FL (ref 9.4–12.3)
POTASSIUM SERPL-SCNC: 3.9 MMOL/L (ref 3.5–5.1)
RBC # BLD AUTO: 4.42 M/UL (ref 4.23–5.6)
SODIUM SERPL-SCNC: 142 MMOL/L (ref 136–145)
WBC # BLD AUTO: 5.3 K/UL (ref 4.3–11.1)

## 2018-08-27 PROCEDURE — 85025 COMPLETE CBC W/AUTO DIFF WBC: CPT

## 2018-08-27 PROCEDURE — 80048 BASIC METABOLIC PNL TOTAL CA: CPT

## 2018-08-27 PROCEDURE — 36415 COLL VENOUS BLD VENIPUNCTURE: CPT

## 2018-08-27 PROCEDURE — 83735 ASSAY OF MAGNESIUM: CPT

## 2018-08-29 ENCOUNTER — ANESTHESIA EVENT (OUTPATIENT)
Dept: SURGERY | Age: 73
End: 2018-08-29
Payer: MEDICARE

## 2018-08-29 NOTE — PROGRESS NOTES
Patient pre-assessment complete for BiV ICD  With Dr Sonny Goyal scheduled for 18 at [address] [place] [address], arrival time 6am. Patient verified using . Patient instructed to bring all home medications in labeled bottles on the day of procedure. NPO status reinforced. . Patient instructed to HOLD spironolactone & losartan in am. Instructed they can take all other medications excluding vitamins & supplements. Patient verbalizes understanding of all instructions & denies any questions at this time.

## 2018-08-30 ENCOUNTER — ANESTHESIA (OUTPATIENT)
Dept: SURGERY | Age: 73
End: 2018-08-30
Payer: MEDICARE

## 2018-08-30 ENCOUNTER — APPOINTMENT (OUTPATIENT)
Dept: GENERAL RADIOLOGY | Age: 73
End: 2018-08-30
Attending: INTERNAL MEDICINE
Payer: MEDICARE

## 2018-08-30 ENCOUNTER — HOSPITAL ENCOUNTER (OUTPATIENT)
Age: 73
Setting detail: OBSERVATION
Discharge: HOME | End: 2018-08-31
Attending: INTERNAL MEDICINE | Admitting: INTERNAL MEDICINE
Payer: MEDICARE

## 2018-08-30 ENCOUNTER — HOSPITAL ENCOUNTER (OUTPATIENT)
Dept: CARDIAC CATH/INVASIVE PROCEDURES | Age: 73
Discharge: HOME | End: 2018-08-30
Payer: MEDICARE

## 2018-08-30 DIAGNOSIS — Z45.02 ICD (IMPLANTABLE CARDIOVERTER-DEFIBRILLATOR) BATTERY DEPLETION: Primary | ICD-10-CM

## 2018-08-30 PROBLEM — I42.9 CARDIOMYOPATHY (HCC): Status: ACTIVE | Noted: 2018-08-30

## 2018-08-30 LAB
ANION GAP SERPL CALC-SCNC: 8 MMOL/L (ref 7–16)
ATRIAL RATE: 60 BPM
ATRIAL RATE: 61 BPM
BUN SERPL-MCNC: 18 MG/DL (ref 8–23)
CALCIUM SERPL-MCNC: 9 MG/DL (ref 8.3–10.4)
CALCULATED P AXIS, ECG09: 44 DEGREES
CALCULATED P AXIS, ECG09: 8 DEGREES
CALCULATED R AXIS, ECG10: -103 DEGREES
CALCULATED R AXIS, ECG10: -45 DEGREES
CALCULATED T AXIS, ECG11: 20 DEGREES
CALCULATED T AXIS, ECG11: 24 DEGREES
CHLORIDE SERPL-SCNC: 106 MMOL/L (ref 98–107)
CO2 SERPL-SCNC: 26 MMOL/L (ref 21–32)
CREAT SERPL-MCNC: 0.89 MG/DL (ref 0.8–1.5)
DIAGNOSIS, 93000: NORMAL
DIAGNOSIS, 93000: NORMAL
ERYTHROCYTE [DISTWIDTH] IN BLOOD BY AUTOMATED COUNT: 13.3 %
GLUCOSE SERPL-MCNC: 89 MG/DL (ref 65–100)
HCT VFR BLD AUTO: 43.8 % (ref 41.1–50.3)
HGB BLD-MCNC: 14.6 G/DL (ref 13.6–17.2)
INR PPP: 1.1
MAGNESIUM SERPL-MCNC: 2.1 MG/DL (ref 1.8–2.4)
MCH RBC QN AUTO: 31.9 PG (ref 26.1–32.9)
MCHC RBC AUTO-ENTMCNC: 33.3 G/DL (ref 31.4–35)
MCV RBC AUTO: 95.6 FL (ref 79.6–97.8)
NRBC # BLD: 0 K/UL (ref 0–0.2)
P-R INTERVAL, ECG05: 206 MS
P-R INTERVAL, ECG05: 234 MS
PLATELET # BLD AUTO: 181 K/UL (ref 150–450)
PMV BLD AUTO: 9 FL (ref 9.4–12.3)
POTASSIUM SERPL-SCNC: 5 MMOL/L (ref 3.5–5.1)
PROTHROMBIN TIME: 13.3 SEC (ref 11.5–14.5)
Q-T INTERVAL, ECG07: 462 MS
Q-T INTERVAL, ECG07: 486 MS
QRS DURATION, ECG06: 136 MS
QRS DURATION, ECG06: 158 MS
QTC CALCULATION (BEZET), ECG08: 462 MS
QTC CALCULATION (BEZET), ECG08: 489 MS
RBC # BLD AUTO: 4.58 M/UL (ref 4.23–5.6)
SODIUM SERPL-SCNC: 140 MMOL/L (ref 136–145)
VENTRICULAR RATE, ECG03: 60 BPM
VENTRICULAR RATE, ECG03: 61 BPM
WBC # BLD AUTO: 4.6 K/UL (ref 4.3–11.1)

## 2018-08-30 PROCEDURE — 77030012935 HC DRSG AQUACEL BMS -B

## 2018-08-30 PROCEDURE — 74011000272 HC RX REV CODE- 272: Performed by: INTERNAL MEDICINE

## 2018-08-30 PROCEDURE — C1882 AICD, OTHER THAN SING/DUAL: HCPCS

## 2018-08-30 PROCEDURE — C1769 GUIDE WIRE: HCPCS

## 2018-08-30 PROCEDURE — 77030008467 HC STPLR SKN COVD -B

## 2018-08-30 PROCEDURE — 33249 INSJ/RPLCMT DEFIB W/LEAD(S): CPT

## 2018-08-30 PROCEDURE — 74011250636 HC RX REV CODE- 250/636: Performed by: INTERNAL MEDICINE

## 2018-08-30 PROCEDURE — 71045 X-RAY EXAM CHEST 1 VIEW: CPT

## 2018-08-30 PROCEDURE — C1892 INTRO/SHEATH,FIXED,PEEL-AWAY: HCPCS

## 2018-08-30 PROCEDURE — 76937 US GUIDE VASCULAR ACCESS: CPT

## 2018-08-30 PROCEDURE — C1898 LEAD, PMKR, OTHER THAN TRANS: HCPCS

## 2018-08-30 PROCEDURE — 33225 L VENTRIC PACING LEAD ADD-ON: CPT

## 2018-08-30 PROCEDURE — 85027 COMPLETE CBC AUTOMATED: CPT

## 2018-08-30 PROCEDURE — 76060000035 HC ANESTHESIA 2 TO 2.5 HR: Performed by: INTERNAL MEDICINE

## 2018-08-30 PROCEDURE — 77030022704 HC SUT VLOC COVD -B

## 2018-08-30 PROCEDURE — C1900 LEAD, CORONARY VENOUS: HCPCS

## 2018-08-30 PROCEDURE — 80048 BASIC METABOLIC PNL TOTAL CA: CPT

## 2018-08-30 PROCEDURE — 83735 ASSAY OF MAGNESIUM: CPT

## 2018-08-30 PROCEDURE — C1894 INTRO/SHEATH, NON-LASER: HCPCS

## 2018-08-30 PROCEDURE — 77030013687 HC GD NDL BARD -B

## 2018-08-30 PROCEDURE — C1895 LEAD, AICD, ENDO DUAL COIL: HCPCS

## 2018-08-30 PROCEDURE — C1751 CATH, INF, PER/CENT/MIDLINE: HCPCS

## 2018-08-30 PROCEDURE — 74011250637 HC RX REV CODE- 250/637: Performed by: INTERNAL MEDICINE

## 2018-08-30 PROCEDURE — 85610 PROTHROMBIN TIME: CPT

## 2018-08-30 PROCEDURE — 74011250636 HC RX REV CODE- 250/636

## 2018-08-30 PROCEDURE — 74011000250 HC RX REV CODE- 250: Performed by: INTERNAL MEDICINE

## 2018-08-30 PROCEDURE — 93005 ELECTROCARDIOGRAM TRACING: CPT | Performed by: INTERNAL MEDICINE

## 2018-08-30 PROCEDURE — 99218 HC RM OBSERVATION: CPT

## 2018-08-30 PROCEDURE — C1887 CATHETER, GUIDING: HCPCS

## 2018-08-30 PROCEDURE — 74011000250 HC RX REV CODE- 250

## 2018-08-30 RX ORDER — SODIUM CHLORIDE 0.9 % (FLUSH) 0.9 %
5-10 SYRINGE (ML) INJECTION AS NEEDED
Status: DISCONTINUED | OUTPATIENT
Start: 2018-08-30 | End: 2018-08-31 | Stop reason: HOSPADM

## 2018-08-30 RX ORDER — LOSARTAN POTASSIUM 25 MG/1
25 TABLET ORAL DAILY
Status: DISCONTINUED | OUTPATIENT
Start: 2018-08-30 | End: 2018-08-31 | Stop reason: HOSPADM

## 2018-08-30 RX ORDER — SPIRONOLACTONE 25 MG/1
25 TABLET ORAL DAILY
Status: DISCONTINUED | OUTPATIENT
Start: 2018-08-30 | End: 2018-08-31 | Stop reason: HOSPADM

## 2018-08-30 RX ORDER — OXYCODONE HYDROCHLORIDE 5 MG/1
5 TABLET ORAL
Status: DISCONTINUED | OUTPATIENT
Start: 2018-08-30 | End: 2018-08-30 | Stop reason: HOSPADM

## 2018-08-30 RX ORDER — SODIUM CHLORIDE 0.9 % (FLUSH) 0.9 %
5-10 SYRINGE (ML) INJECTION EVERY 8 HOURS
Status: DISCONTINUED | OUTPATIENT
Start: 2018-08-30 | End: 2018-08-31 | Stop reason: HOSPADM

## 2018-08-30 RX ORDER — LIDOCAINE HYDROCHLORIDE 20 MG/ML
INJECTION, SOLUTION EPIDURAL; INFILTRATION; INTRACAUDAL; PERINEURAL AS NEEDED
Status: DISCONTINUED | OUTPATIENT
Start: 2018-08-30 | End: 2018-08-30 | Stop reason: HOSPADM

## 2018-08-30 RX ORDER — HYDROMORPHONE HYDROCHLORIDE 2 MG/ML
0.5 INJECTION, SOLUTION INTRAMUSCULAR; INTRAVENOUS; SUBCUTANEOUS
Status: DISCONTINUED | OUTPATIENT
Start: 2018-08-30 | End: 2018-08-30 | Stop reason: HOSPADM

## 2018-08-30 RX ORDER — BUPIVACAINE HYDROCHLORIDE AND EPINEPHRINE 5; 5 MG/ML; UG/ML
30 INJECTION, SOLUTION EPIDURAL; INTRACAUDAL; PERINEURAL ONCE
Status: COMPLETED | OUTPATIENT
Start: 2018-08-30 | End: 2018-08-30

## 2018-08-30 RX ORDER — ASPIRIN 81 MG/1
81 TABLET ORAL DAILY
Status: DISCONTINUED | OUTPATIENT
Start: 2018-08-31 | End: 2018-08-31 | Stop reason: HOSPADM

## 2018-08-30 RX ORDER — LEFLUNOMIDE 20 MG/1
20 TABLET ORAL DAILY
Status: DISCONTINUED | OUTPATIENT
Start: 2018-08-31 | End: 2018-08-31 | Stop reason: HOSPADM

## 2018-08-30 RX ORDER — ATORVASTATIN CALCIUM 40 MG/1
40 TABLET, FILM COATED ORAL
Status: DISCONTINUED | OUTPATIENT
Start: 2018-08-30 | End: 2018-08-31 | Stop reason: HOSPADM

## 2018-08-30 RX ORDER — SODIUM CHLORIDE, SODIUM LACTATE, POTASSIUM CHLORIDE, CALCIUM CHLORIDE 600; 310; 30; 20 MG/100ML; MG/100ML; MG/100ML; MG/100ML
INJECTION, SOLUTION INTRAVENOUS
Status: DISCONTINUED | OUTPATIENT
Start: 2018-08-30 | End: 2018-08-30 | Stop reason: HOSPADM

## 2018-08-30 RX ORDER — EPHEDRINE SULFATE 50 MG/ML
INJECTION, SOLUTION INTRAVENOUS AS NEEDED
Status: DISCONTINUED | OUTPATIENT
Start: 2018-08-30 | End: 2018-08-30 | Stop reason: HOSPADM

## 2018-08-30 RX ORDER — METOPROLOL TARTRATE 25 MG/1
25 TABLET, FILM COATED ORAL 2 TIMES DAILY
Status: DISCONTINUED | OUTPATIENT
Start: 2018-08-30 | End: 2018-08-31 | Stop reason: HOSPADM

## 2018-08-30 RX ORDER — HYDROCODONE BITARTRATE AND ACETAMINOPHEN 5; 325 MG/1; MG/1
1 TABLET ORAL
Status: DISCONTINUED | OUTPATIENT
Start: 2018-08-30 | End: 2018-08-31 | Stop reason: HOSPADM

## 2018-08-30 RX ORDER — MORPHINE SULFATE 2 MG/ML
2 INJECTION, SOLUTION INTRAMUSCULAR; INTRAVENOUS
Status: DISCONTINUED | OUTPATIENT
Start: 2018-08-30 | End: 2018-08-31 | Stop reason: HOSPADM

## 2018-08-30 RX ORDER — CEFAZOLIN SODIUM/WATER 2 G/20 ML
2 SYRINGE (ML) INTRAVENOUS EVERY 8 HOURS
Status: COMPLETED | OUTPATIENT
Start: 2018-08-30 | End: 2018-08-31

## 2018-08-30 RX ORDER — PROPOFOL 10 MG/ML
INJECTION, EMULSION INTRAVENOUS
Status: DISCONTINUED | OUTPATIENT
Start: 2018-08-30 | End: 2018-08-30 | Stop reason: HOSPADM

## 2018-08-30 RX ORDER — OXYCODONE AND ACETAMINOPHEN 10; 325 MG/1; MG/1
1 TABLET ORAL AS NEEDED
Status: DISCONTINUED | OUTPATIENT
Start: 2018-08-30 | End: 2018-08-30 | Stop reason: HOSPADM

## 2018-08-30 RX ORDER — CEFAZOLIN SODIUM/WATER 2 G/20 ML
2 SYRINGE (ML) INTRAVENOUS ONCE
Status: COMPLETED | OUTPATIENT
Start: 2018-08-30 | End: 2018-08-30

## 2018-08-30 RX ORDER — SODIUM CHLORIDE 0.9 % (FLUSH) 0.9 %
5-10 SYRINGE (ML) INJECTION AS NEEDED
Status: DISCONTINUED | OUTPATIENT
Start: 2018-08-30 | End: 2018-08-30 | Stop reason: HOSPADM

## 2018-08-30 RX ORDER — SODIUM CHLORIDE, SODIUM LACTATE, POTASSIUM CHLORIDE, CALCIUM CHLORIDE 600; 310; 30; 20 MG/100ML; MG/100ML; MG/100ML; MG/100ML
75 INJECTION, SOLUTION INTRAVENOUS CONTINUOUS
Status: DISCONTINUED | OUTPATIENT
Start: 2018-08-30 | End: 2018-08-30 | Stop reason: HOSPADM

## 2018-08-30 RX ORDER — ACETAMINOPHEN 325 MG/1
650 TABLET ORAL
Status: DISCONTINUED | OUTPATIENT
Start: 2018-08-30 | End: 2018-08-31 | Stop reason: HOSPADM

## 2018-08-30 RX ADMIN — HYDROCODONE BITARTRATE AND ACETAMINOPHEN 1 TABLET: 5; 325 TABLET ORAL at 22:15

## 2018-08-30 RX ADMIN — Medication 5 ML: at 21:20

## 2018-08-30 RX ADMIN — NEOMYCIN AND POLYMYXIN B SULFATES: 40; 200000 IRRIGANT IRRIGATION at 08:39

## 2018-08-30 RX ADMIN — ATORVASTATIN CALCIUM 40 MG: 40 TABLET, FILM COATED ORAL at 17:43

## 2018-08-30 RX ADMIN — EPHEDRINE SULFATE 5 MG: 50 INJECTION, SOLUTION INTRAVENOUS at 08:40

## 2018-08-30 RX ADMIN — LIDOCAINE HYDROCHLORIDE 60 MG: 20 INJECTION, SOLUTION EPIDURAL; INFILTRATION; INTRACAUDAL; PERINEURAL at 08:02

## 2018-08-30 RX ADMIN — HYDROCODONE BITARTRATE AND ACETAMINOPHEN 1 TABLET: 5; 325 TABLET ORAL at 16:01

## 2018-08-30 RX ADMIN — Medication 2 G: at 15:57

## 2018-08-30 RX ADMIN — PROPOFOL 140 MCG/KG/MIN: 10 INJECTION, EMULSION INTRAVENOUS at 08:05

## 2018-08-30 RX ADMIN — BUPIVACAINE HYDROCHLORIDE AND EPINEPHRINE BITARTRATE 150 MG: 5; .005 INJECTION, SOLUTION EPIDURAL; INTRACAUDAL; PERINEURAL at 08:40

## 2018-08-30 RX ADMIN — Medication 5 ML: at 13:09

## 2018-08-30 RX ADMIN — Medication 2 G: at 07:58

## 2018-08-30 RX ADMIN — METOPROLOL TARTRATE 25 MG: 25 TABLET, FILM COATED ORAL at 17:42

## 2018-08-30 RX ADMIN — SODIUM CHLORIDE, SODIUM LACTATE, POTASSIUM CHLORIDE, CALCIUM CHLORIDE: 600; 310; 30; 20 INJECTION, SOLUTION INTRAVENOUS at 07:58

## 2018-08-30 RX ADMIN — PROPOFOL 100 MCG/KG/MIN: 10 INJECTION, EMULSION INTRAVENOUS at 08:02

## 2018-08-30 NOTE — PROCEDURES
Pre-Procedure Diagnosis  1. Chronic systolic heart failure, ejection fraction of 20%. 2. Ischemic dilated cardiomyopathy. 3. Class III heart failure symptoms. 4. Chronic systolic heart failure for a minimum of 3 months duration on optimal medical therapy. 5. Primary prevention indications for defibrillator  6. Life expectancy greater than 1 year. 7. LBBB with QRS duration of 150 msec. Procedure Performed  1. Insertion of [employer] biventricular implantable cardioverter defibrillator. 2. Insertion of left ventricular lead at time of new system Implantation. Anesthesia: MAC     Estimated Blood Loss: Less than 10 mL     Specimens: * No specimens in log *     The procedure, indications, risks, benefits, and alternatives were discussed with the patient and family members, who desired to proceed after questions were answered and informed consent was documented. Procedure: After informed consent was obtained, the patient was brought to the Electrophysiology Laboratory in a fasting state and was prepped and draped in sterile fashion. Prophylactic antibiotic was administered 10 minutes prior to skin incision (refer to anesthesia procedural documentation for details). MAC was administered by the anesthesia team with continuous oxygen saturation measurement and blood pressure measurement. Local anesthetic (sensorcaine) was delivered to the left pectoral region and an incision was made parallel to the deltopectoral groove. A subcutaneous pocket was created using blunt dissection and electrocautery, and adequate hemostasis was established. Access x 3 was obtained under ultrasound guidance using a modified Seldinger technique with placement of 2 short wires and a long wire down into the RA and advanced below the diaphragm. Next, placement of a peel-away sheath over the first guidewire was performed.   A permanent RV single coil ICD lead was then advanced under fluoroscopic guidance into the RV apex in a stable position with satisfactory sensing and pacing characteristics. The peel away sheath was removed. Another Safe-sheath was then placed over the second guidewire. A permanent pacing lead was advanced under fluoroscopic guidance and positioned in the right atrium at the location of the RAA. Stable RA appendage position with satisfactory sensing and pacing characteristics was obtained. The sheath was peeled. The leads were sutured to the underlying pectoralis fascia using 2 non-absorbable sutures around each lead's collar. The lead slack and position was assessed under fluoroscopy while securing the lead collars to ensure proper length. After positioning the RA and RV leads, a standard Merit Harry LV delivery sheath was advanced over the long access wire and dilator and wire were removed. The braided inner sheath was then advanced into the [id number] and used to cannulate the coronary sinus using contrast when necessary. A Glide wire and was used to allow a smooth transition into the CS body. A coronary sinus venogram was then performed using a balloon occluding catheter. A Merit renal inner sheath was used with an Acuity Straight trak to cannulate a posterolateral branch. The renal was advanced into the posterolateral and a subselected venogram was performed. The left ventricular lead was then advanced into a posterolateral brach over an angioplasty wire. Adequate thresholds were obtained with an adequate margin between phrenic stim and loss of capture. The delivery sheaths were then slit with fluoroscopy demonstrating stable position. The lead was then sutured to the underlying pectoralis fascia using 2 non-absorbable sutures around the lead collar. Final lead testing via the pacing system analyzer (PSA) demonstrated stable sensing, impedance and pacing thresholds. The lead pins were then cleaned with antibiotic soaked gauze, dried gently, and attached to a new defibrillator generator.  Pins were directly observed to pass the tip electrode, and the ring hex wrench screws were secured, and leads tug tested. The pocket was irrigated copiously with a saline antimicrobial solution. The device and leads were gently positioned within the pocket. The wound was closed with multiple layers of absorbable suture followed by skin closure with staples. Fluoroscopic images were interpreted by me, in multiple projections. Final device position was confirmed by stored fluoroscopic image from device pocket to lead tips in AP projection. The patient tolerated the procedure well and left the lab in good condition.      Lead Data:    Device and Lead Information  Pulse Generator Model #  Serial # Location Implant   [id number] [facility] [id number] Left Pectoral Implant       Lead Model Number  Serial Number Lead position Implant   RA [id number] [facility] [id number] RA [place]end[place] Implant     RV [id number] [facility] [id number] RV [place] Implant     LV [id number] [facility] [id number] LCV Implant     Lead Sensitivity and Threshold  Lead R or P sensitivity (mv) Threshold (V) Threshold PW (msec) Impedance (ohms) Final output Voltage (V) Final PW (msec)   RA 2.6 1.00 0.30 390 2.50 0.30   RV 3.0 0.75 0.30 530 2.50 0.30   LV - 1.00 0.30 810 2.00 0.30   LV2 - 1.25 0.50 300 2.00 0.50     Bradycardia Settings  Jose Mode LRL URL Pace AVD (ms) Sense AVD (ms) Rate Response Mode Switching Mode SW Rate   DDD 60 130 225 160 On On On       Tachycardia Settings  Zone Type VT-1 VT-2 VF   ON/OFF/  MONITOR ON OFF ON   Zone Rate 171  100 Intervals  222   1st Therapy Type ATP-burst x3  Shock   Energy (J) 85%  30   2nd Therapy Type Shock  Shock   Energy (J) 30  40   3rd Therapy Type Shock  Shock   Energy (J) 40  40   4th Therapy Type Shock  Shock   Energy (J) 40  40   5th Therapy Type Shock  Shock   Energy (J) 40  40   6th Therapy Type   Shock   Energy (J)   40     Defibrillation Threshold Testing  DFT# How induced Successful test? Shock Imped (ohms) Energy (J) Charge time (sec) Rescue needed? Defib threshold (J)                         Contrast: 40 ml    Fluoro Time:   8.3 minutes    Complications: None    IMPRESSION: Successful implantation of [employer] biventricular implantable cardioverter defibrillator for primary prevention of sudden death. Diana Brown MD, MS  Clinical Cardiac Electrophysiology  8/30/2018  10:06 AM

## 2018-08-30 NOTE — PROGRESS NOTES
TRANSFER - OUT REPORT: 
 
Verbal report given to [facility] RN on Lissett Valenzuela  being transferred to [facility](unit) for routine progression of care Report consisted of patients Situation, Background, Assessment and  
Recommendations(SBAR). Information from the following report(s) Procedure Summary was reviewed with the receiving nurse. Lines:  
Peripheral IV 08/30/18 Left Forearm (Active) Peripheral IV 08/30/18 Right Forearm (Active) Opportunity for questions and clarification was provided.

## 2018-08-30 NOTE — PROGRESS NOTES
TRANSFER - IN REPORT: 
 
Verbal report received from DAKOTA Bhakta on Genita Plan being received from [facility] for routine progression of care Report consisted of patients Situation, Background, Assessment and Recommendations(SBAR). Information from the following report(s) SBAR and Procedure Summary was reviewed with the receiving nurse. Opportunity for questions and clarification was provided. Assessment completed upon patients arrival to unit and care assumed.

## 2018-08-30 NOTE — PROGRESS NOTES
Patient received to [facility] room # 10  Ambulatory from [facility]. Patient scheduled for Bi V ICD today with Dr Barry Rothman. Procedure reviewed & questions answered, voiced good understanding consent obtained & placed on chart. All medications and medical history reviewed. Will prep patient per orders. Patient & family updated on plan of care. The patient has a fraility score of 3-MANAGING WELL, based on independent of ADLs/ambulation. Increased symptoms with exertion.

## 2018-08-30 NOTE — IP AVS SNAPSHOT
[address] [place], [place] [id number] 
[phone] Patient: Ori Dill MRN: [id number] UTQ:9[birth year]41/2206 A check amarilis indicates which time of day the medication should be taken. My Medications START taking these medications Instructions Each Dose to Equal  
 Morning Noon Evening Bedtime HYDROcodone-acetaminophen 5-325 mg per tablet Commonly known as:  Hanh Choe Notes to Patient:  As needed for pain Take 1 Tab by mouth every four (4) hours as needed. Max Daily Amount: 6 Tabs. 1 Tab CONTINUE taking these medications Instructions Each Dose to Equal  
 Morning Noon Evening Bedtime  
 aspirin delayed-release 81 mg tablet Your last dose was:  8/31/18 Your next dose is:  9/1/18 Take 81 mg by mouth every morning. Indications: continue 81 mg  
    
  
   
   
   
  
 atorvastatin 40 mg tablet Commonly known as:  LIPITOR Your last dose was:  8/30/18 Your next dose is:  8/31/18 Take 1 Tab by mouth daily. 40 mg  
    
   
   
   
  
  
 leflunomide 20 mg tablet Commonly known as:  Delta Viviana Your last dose was:  8/31/18 Your next dose is:  9/1/18 Take 20 mg by mouth every morning. Indications: RHEUMATOID ARTHRITIS  
 20 mg  
    
  
   
   
   
  
 losartan 25 mg tablet Commonly known as:  COZAAR Your last dose was:  8/31/18 Your next dose is:  9/1/18 Take 1 Tab by mouth daily. 25 mg  
    
  
   
   
   
  
 metoprolol tartrate 25 mg tablet Commonly known as:  LOPRESSOR Your last dose was:  8/31/18 Your next dose is:  8/31/18 evening Take 1 Tab by mouth two (2) times a day. 25 mg  
    
  
   
   
  
   
  
 multivitamin tablet Commonly known as:  ONE A DAY Your next dose is:  Resume 9/1/18 Take 1 Tab by mouth daily. Indications: hold until after surgery 1 Tab nitroglycerin 0.4 mg SL tablet Commonly known as:  NITROSTAT Notes to Patient:  As needed for chest pain 1 Tab by SubLINGual route every five (5) minutes as needed for Chest Pain. Indications: bring on the dos. 0.4 mg  
    
   
   
   
  
 spironolactone 25 mg tablet Commonly known as:  ALDACTONE Notes to Patient:  Continue as directed by your doctor 1/2 tablet daily Where to Get Your Medications Information on where to get these meds will be given to you by the nurse or doctor. ! Ask your nurse or doctor about these medications HYDROcodone-acetaminophen 5-325 mg per tablet

## 2018-08-30 NOTE — PROGRESS NOTES
Admission skin assessment completed with second RN Titi Scrape and reveals the following: no breakdown noted over sacrum or bony prominences. Skin intact except Left subclavian incision with pressure dressing over aquacel dressing.

## 2018-08-30 NOTE — ANESTHESIA PREPROCEDURE EVALUATION
Anesthetic History No history of anesthetic complications Review of Systems / Medical History Patient summary reviewed and pertinent labs reviewed Pulmonary Sleep apnea: CPAP Neuro/Psych Within defined limits Cardiovascular Hypertension: well controlled Dysrhythmias Past MI, CAD and CABG Exercise tolerance: <4 METS Comments: L BBB  
GI/Hepatic/Renal 
  
GERD: well controlled Endo/Other Hypothyroidism: well controlled Arthritis Other Findings Physical Exam 
 
Airway Mallampati: II 
TM Distance: > 6 cm Neck ROM: normal range of motion Mouth opening: Normal 
 
 Cardiovascular Rhythm: regular Dental 
 
 
  
Pulmonary Abdominal 
GI exam deferred Other Findings Anesthetic Plan ASA: 3 Anesthesia type: total IV anesthesia Induction: Intravenous Anesthetic plan and risks discussed with: Patient

## 2018-08-30 NOTE — IP AVS SNAPSHOT
[address] [id number] [facility] [address] 
[phone] Patient: Audrey Oshea MRN: [id number]:[id number] About your hospitalization You were admitted on:  August 30, 2018 You last received care in the:  [facility] 3 CLINICAL OBSERVATION You were discharged on:  August 31, 2018 Why you were hospitalized Your primary diagnosis was:  Not on File Your diagnoses also included:  Ischemic Cardiomyopathy, Cardiomyopathy (Hcc) Follow-up Information Follow up With Details Comments Contact Info Dennis Harris MD  As needed [address] [address] [facility] [address] 
[phone] [place] OFFICE On 9/14/2018 Device Clinic on 9/14 at 9:30 AM [place] office [address] 
[address] [address] [facility] 
[phone] Your Scheduled Appointments Wednesday September 12, 2018 10:15 AM EDT Office Visit with Mathew Babb MD  
[address] ([address] [place] [address]) [address] 
[address] [place] [address]  
[phone] Friday September 14, 2018  9:30 AM EDT  
OFFICE DEVICE CHECKS with [facility] CARDIOLOGY [place] OFFICE ([address] [place] [address]) [address] 
[address] [place] [address]  
[phone] This upcoming device check will take place IN OUR OFFICE. Please arrive 15 minutes early to review any necessary paperwork requirements. If you have any further questions or need to change this appointment, we are happy to help and can be reached at [phone]. Discharge Orders None A check amarilis indicates which time of day the medication should be taken. My Medications START taking these medications Instructions Each Dose to Equal  
 Morning Noon Evening Bedtime HYDROcodone-acetaminophen 5-325 mg per tablet Commonly known as:  Fina Blas  
 Notes to Patient:  As needed for pain Take 1 Tab by mouth every four (4) hours as needed. Max Daily Amount: 6 Tabs. 1 Tab CONTINUE taking these medications Instructions Each Dose to Equal  
 Morning Noon Evening Bedtime  
 aspirin delayed-release 81 mg tablet Your last dose was:  8/31/18 Your next dose is:  9/1/18 Take 81 mg by mouth every morning. Indications: continue 81 mg  
    
  
   
   
   
  
 atorvastatin 40 mg tablet Commonly known as:  LIPITOR Your last dose was:  8/30/18 Your next dose is:  8/31/18 Take 1 Tab by mouth daily. 40 mg  
    
   
   
   
  
  
 leflunomide 20 mg tablet Commonly known as:  Steve Cashton Your last dose was:  8/31/18 Your next dose is:  9/1/18 Take 20 mg by mouth every morning. Indications: RHEUMATOID ARTHRITIS  
 20 mg  
    
  
   
   
   
  
 losartan 25 mg tablet Commonly known as:  COZAAR Your last dose was:  8/31/18 Your next dose is:  9/1/18 Take 1 Tab by mouth daily. 25 mg  
    
  
   
   
   
  
 metoprolol tartrate 25 mg tablet Commonly known as:  LOPRESSOR Your last dose was:  8/31/18 Your next dose is:  8/31/18 evening Take 1 Tab by mouth two (2) times a day. 25 mg  
    
  
   
   
  
   
  
 multivitamin tablet Commonly known as:  ONE A DAY Your next dose is:  Resume 9/1/18 Take 1 Tab by mouth daily. Indications: hold until after surgery 1 Tab  
    
  
   
   
   
  
 nitroglycerin 0.4 mg SL tablet Commonly known as:  NITROSTAT Notes to Patient:  As needed for chest pain 1 Tab by SubLINGual route every five (5) minutes as needed for Chest Pain. Indications: bring on the dos. 0.4 mg  
    
   
   
   
  
 spironolactone 25 mg tablet Commonly known as:  ALDACTONE Notes to Patient:  Continue as directed by your doctor 1/2 tablet daily Where to Get Your Medications Information on where to get these meds will be given to you by the nurse or doctor. ! Ask your nurse or doctor about these medications HYDROcodone-acetaminophen 5-325 mg per tablet Opioid Education Prescription Opioids: What You Need to Know: 
 
Prescription opioids can be used to help relieve moderate-to-severe pain and are often prescribed following a surgery or injury, or for certain health conditions. These medications can be an important part of treatment but also come with serious risks. Opioids are strong pain medicines. Examples include hydrocodone, oxycodone, fentanyl, and morphine. Heroin is an example of an illegal opioid. It is important to work with your health care provider to make sure you are getting the safest, most effective care. WHAT ARE THE RISKS AND SIDE EFFECTS OF OPIOID USE? Prescription opioids carry serious risks of addiction and overdose, especially with prolonged use. An opioid overdose, often marked by slow breathing, can cause sudden death. The use of prescription opioids can have a number of side effects as well, even when taken as directed. · Tolerance-meaning you might need to take more of a medication for the same pain relief · Physical dependence-meaning you have symptoms of withdrawal when the medication is stopped. Withdrawal symptoms can include nausea, sweating, chills, diarrhea, stomach cramps, and muscle aches. Withdrawal can last up to several weeks, depending on which drug you took and how long you took it. · Increased sensitivity to pain · Constipation · Nausea, vomiting, and dry mouth · Sleepiness and dizziness · Confusion · Depression · Low levels of testosterone that can result in lower sex drive, energy, and strength · Itching and sweating RISKS ARE GREATER WITH:      
· History of drug misuse, substance use disorder, or overdose · Mental health conditions (such as depression or anxiety) · Sleep apnea · Older age (72 years or older) · Pregnancy Avoid alcohol while taking prescription opioids. Also, unless specifically advised by your health care provider, medications to avoid include: · Benzodiazepines (such as Xanax or Valium) · Muscle relaxants (such as Soma or Flexeril) · Hypnotics (such as Ambien or Lunesta) · Other prescription opioids KNOW YOUR OPTIONS Talk to your health care provider about ways to manage your pain that don't involve prescription opioids. Some of these options may actually work better and have fewer risks and side effects. Options may include: 
· Pain relievers such as acetaminophen, ibuprofen, and naproxen · Some medications that are also used for depression or seizures · Physical therapy and exercise · Counseling to help patients learn how to cope better with triggers of pain and stress. · Application of heat or cold compress · Massage therapy · Relaxation techniques Be Informed Make sure you know the name of your medication, how much and how often to take it, and its potential risks & side effects. IF YOU ARE PRESCRIBED OPIOIDS FOR PAIN: 
· Never take opioids in greater amounts or more often than prescribed. Remember the goal is not to be pain-free but to manage your pain at a tolerable level. · Follow up with your primary care provider to: · Work together to create a plan on how to manage your pain. · Talk about ways to help manage your pain that don't involve prescription opioids. · Talk about any and all concerns and side effects. · Help prevent misuse and abuse. · Never sell or share prescription opioids · Help prevent misuse and abuse. · Store prescription opioids in a secure place and out of reach of others (this may include visitors, children, friends, and family).  
· Safely dispose of unused/unwanted prescription opioids: Find your community drug take-back program or your pharmacy mail-back program, or flush them down the toilet, following guidance from the Food and Drug Administration (www.fda.gov/Drugs/ResourcesForYou). · Visit www.cdc.gov/drugoverdose to learn about the risks of opioid abuse and overdose. · If you believe you may be struggling with addiction, tell your health care provider and ask for guidance or call [facility]ann [place] at 2-815-036-ERDU. Discharge Instructions DISPOSITION: Patient has been instructed to keep affected arm below shoulder level for the next 4 weeks or until cleared by doctor. The arm sling should be worn while sleeping. The dressing will be removed at follow-up. The incision site must be kept clean and dry. The patient may shower in a few days. Lotions, powders, or creams should be avoided as these can increase the risk of infection. The affected arm should not be used for any pushing, pulling, or lifting until cleared by doctor. Driving is prohibited until cleared by doctor in a follow up appointment. Any signs of infection including increased redness, suspicious drainage, or unexplained fever should be reported to the doctor immediately by calling [phone]. PACEMAKER INSTRUCTIONS SHEET 
· Keep your incision dry for 10 days. DO NOT put salves, ointments, and/or lotions on the incision. Only take a tub bath during this time; NO showers. · The pieces of tape on the incision will come off by themselves when you begin washing the site. Please do not pull or tear them off. · You may use your pacemaker arm; but DO NOT raise the arm higher than your shoulder for the first two weeks to prevent the pacemaker lead from moving. DO NOT immobilize your pacemaker arm. · Call us IMMEDIATELY if you develop fever, pain, redness, and/or drainage at the pacemaker arm. · Do not lift more than 10 pounds for 2 weeks and 20 pounds for 1 month. · Microwaves WILL NOT harm your pacemaker. Warning does not apply to you. · Avoid activities which can reprogram your pacemaker such as arc welding, ham radios, and tanning booths. At airports, always show your pacemaker identification card. You may walk through the metal detector, but do not allow the hand held wand near your pacemaker. Do not have a MRI or NMR scan without talking to your cardiologist. 
· Your pacemakers function will be evaluated over the telephone. Within 3 weeks you should receive a schedule. If you do not receive a schedule, or if you have questions, you may call ***. · Remove the battery from the transmitter after each use. Always keep a spare 9 volt battery. · The pacemaker battery is tested by the heart rate with the magnet applied. This test is done each time you transmit your ECG so it is very important that you follow your schedule. · Carry your pacemaker identification card at all times. Within 6 weeks, you should receive your permanent card. Keep your temporary card as a spare. Call *** if you do not receive your card or if you lose your card. · Always show the doctor or dentist your pacemaker identification card. · If you have questions about your pacemaker or office appointment, please call the office of *** at ***. Following the pacemaker implant, you will need to return to the clinic to see your doctor within 1 week. If you did not receive an appointment, please call ***. Implantable Cardioverter-Defibrillator Placement: What to Expect at Home Your Recovery Implantable cardioverter-defibrillator (ICD) placement is surgery to put an ICD in your chest. An ICD is a small, battery-powered device that fixes life-threatening changes in your heartbeat. If the ICD detects a life-threatening rapid heart rhythm, it tries to slow the rhythm back to normal using electrical pulses.  If the dangerous rhythm does not stop, the ICD sends an electric shock to the heart to restore a normal rhythm. The device then goes back to its watchful mode. Your chest may be sore where the doctor made the cut (incision) and put in the ICD. You also may have a bruise and mild swelling. These symptoms usually get better in 1 to 2 weeks. You may feel a hard ridge along the incision. This usually gets softer in the months after surgery. You probably will be able to see and feel the outline of the ICD under your skin. You will probably be able to go back to work or your usual routine 1 to 2 weeks after surgery. Your doctor will talk to you about how often you will need to have the ICD checked. When you have an ICD, it is important to avoid electrical devices that can stop your ICD from working right. Check with your doctor about what you need to stay away from, what you need to use with care, and what is okay to use. You will need to stay away from things with strong magnetic and electrical fields such as MRI machines (unless your ICD is safe for an MRI), welding equipment, and power generators. You can use a cell phone, but keep it at least 6 inches away from your ICD. You can safely use most household and office electronics. These include kitchen appliances, electric power tools, and computers. This care sheet gives you a general idea about how long it will take for you to recover. But each person recovers at a different pace. Follow the steps below to get better as quickly as possible. How can you care for yourself at home? Activity 
  · Rest when you feel tired. Getting enough sleep will help you recover.  
  · Try to walk each day. Start by walking a little more than you did the day before. Bit by bit, increase the amount you walk. Walking boosts blood flow and helps prevent pneumonia and constipation.  
  · For 4 to 6 weeks: ¨ Avoid activities that strain your chest or upper arm muscles.  This includes pushing a  or vacuum, mopping floors, swimming, or swinging a golf club or tennis racquet. ¨ Do not raise your arm, on the side of your body where the ICD is located, above shoulder level. ¨ Avoid strenuous activities, such as bicycle riding, jogging, weight lifting, or heavy aerobic exercise. ¨ Avoid lifting anything that would make you strain. This may include heavy grocery bags and milk containers, a heavy briefcase or backpack, cat litter or dog food bags, or a child.  
  · Ask your doctor when you can drive again.  
  · You will probably need to take about 1 to 2 weeks off from work. It depends on the type of work you do and how you feel.  
  · Ask your doctor when it is okay for you to have sex. Diet 
  · You can eat your normal diet. If your stomach is upset, try bland, low-fat foods like plain rice, broiled chicken, toast, and yogurt.  
  · Drink plenty of fluids (unless your doctor tells you not to). Medicines 
  · Your doctor will tell you if and when you can restart your medicines. He or she will also give you instructions about taking any new medicines.  
  · If you take blood thinners, such as warfarin (Coumadin), clopidogrel (Plavix), or aspirin, be sure to talk to your doctor. He or she will tell you if and when to start taking those medicines again. Make sure that you understand exactly what your doctor wants you to do.  
  · Take pain medicines exactly as directed. ¨ If the doctor gave you a prescription medicine for pain, take it as prescribed. ¨ If you are not taking a prescription pain medicine, ask your doctor if you can take an over-the-counter medicine. ¨ Do not take aspirin, ibuprofen (Advil, Motrin), naproxen (Aleve), or other nonsteroidal anti-inflammatory drugs (NSAIDs) unless your doctor says it is okay.  
  · If you think your pain medicine is making you sick to your stomach: 
¨ Take your medicine after meals (unless your doctor has told you not to). ¨ Ask your doctor for a different pain medicine.  
  · If your doctor prescribed antibiotics, take them as directed. Do not stop taking them just because you feel better. You need to take the full course of antibiotics. Incision care 
  · Keep the area clean and dry.  
  · Ask your doctor when you can shower or take a bath.  
  · If you have strips of tape on the incision, leave the tape on for a week or until it falls off.  
  · Wash the area daily with warm, soapy water, and pat it dry. Don't use hydrogen peroxide or alcohol, which can slow healing. You may cover the area with a gauze bandage if it weeps or rubs against clothing. Exercise 
  · Check with your doctor before you start an exercise program. Your doctor may recommend that you work with a physical therapist to learn how to exercise safely with an ICD. Other instructions 
  · Carry your ICD identification card with you at all times. The card should include the ICD  and model information.  
  · Wear medical alert jewelry that states you have an ICD. You can buy this at most drugsHooked Media Group.  
  · Have your ICD checked as often as your doctor recommends. In some cases, this may be done over the phone or the Internet. Marie Teague will give you instructions about how to do this.  
  · Talk with your doctor about the possibility of turning off the ICD at the end of life. You can put your wishes about the ICD in an advance directive. Follow-up care is a key part of your treatment and safety. Be sure to make and go to all appointments, and call your doctor if you are having problems. It's also a good idea to know your test results and keep a list of the medicines you take. When should you call for help? Call 911 anytime you think you may need emergency care. For example, call if: 
  · You passed out (lost consciousness).  
  · You have trouble breathing.  
 Call your doctor now or seek immediate medical care if:   · You receive a shock from your ICD.  
  · You are dizzy or lightheaded, or you feel like you may faint.  
  · You have pain that does not get better after you take pain medicine.  
  · You hear an alarm or feel a vibration from your ICD.  
  · You have loose stitches, or your incision comes open.  
  · Bright red blood has soaked through the bandage over your incision.  
  · You have signs of infection, such as: 
¨ Increased pain, swelling, warmth, or redness. ¨ Red streaks leading from the incision. ¨ Pus draining from the incision. ¨ A fever.  
 Watch closely for changes in your health, and be sure to contact your doctor if you have any problems. Where can you learn more? Go to http://alissa-stacey.info/. Enter K184 in the search box to learn more about \"Implantable Cardioverter-Defibrillator Placement: What to Expect at Home. \" Current as of: December 6, 2017 Content Version: 11.7 © 4674-5386 [employer]. Care instructions adapted under license by [employer] (which disclaims liability or warranty for this information). If you have questions about a medical condition or this instruction, always ask your healthcare professional. [address] [address] any warranty or liability for your use of this information. Learning About ICD Shocks What are ICDs and ICD shocks? An implantable cardioverter-defibrillator (ICD) is a device that is placed under the skin of your chest. It has thin wires (called leads). Most of the time, these leads are placed inside the heart. The ICD is always checking your heart. If it detects a life-threatening rapid heart rhythm, it tries to slow the rhythm to get it back to normal. If the dangerous rhythm does not stop, the ICD sends an electric shock to the heart to restore a normal rhythm. The device then goes back to its watchful mode. The idea of living with an ICD and getting shocked worries some people. The shock can be uncomfortable. It may feel like you are being kicked in the chest. For many people, getting a shock can cause anxiety and depression. It's normal to be worried about living with an ICD. After all, you don't know when a shock might occur, and a shock could be a reminder that your heart is not as healthy as it could be. But an ICD is an important part of your treatment. It can save your life. If you take a few simple steps, you can feel better about having an ICD. How can you get over your fears about the ICD? Know your ICD treatment · Learn how the ICD works, what it does, and how it keeps you safe. This can help reduce any anxiety you may feel. · Keep your regular doctor appointments. Your doctor: ¨ Sets both the rate at which a shock will occur and the level of shock needed to restore your heart to a normal rate. ¨ Checks to see whether the ICD has given you any shocks since your last visit. This helps your doctor know if your medicines need to be adjusted. ¨ Checks the ICD battery and replaces it as needed. · Talk with others who have an ICD. Ask them if they have been shocked and what it was like. Ask them how they cope with it. Talking with others can help you feel better. · Always carry your ICD identity card, a list of all the medicines you are taking, and your doctor's name and phone number. This will help you get the best possible treatment if you get a shock and need help. Make an action plan Talk to your doctor about making an action plan for what to do if you get shocked. Here is an example: · After one shock: 
¨ Call 911 or other emergency services right away if you feel bad or have symptoms like chest pain. ¨ Call your doctor soon if you feel fine right away after the shock. Your doctor may want to talk about the shock and schedule a follow-up visit. · If you get a second shock in a 24-hour period, call your doctor right away. Call even if you feel fine right away. Stay calm after a shock · Follow the action plan you made with your doctor. · Do some breathing exercises. They may help you relax. ¨ Sit or lie in a comfortable position. Put one hand on your belly just below your ribs and the other hand on your chest. 
¨ Take a deep breath in through your nose, and let your belly push your hand out. Your chest should not move. ¨ Breathe out through pursed lips as if you were whistling. Feel the hand on your belly go in, and use it to push all the air out. ¨ Breathe in and out like this until you feel more relaxed. · Keep a good attitude. When you've had a shock, you may question how healthy you are or worry about getting another shock. But try to focus on the positive things in your life, like loving relationships, pleasant activities, or good friends. · Don't make changes in what you do. You may want to avoid an action because you think it caused the shock. But a shock can occur at any time, and you can't prevent shocks by your actions alone. Don't stop doing things you enjoy to try to avoid a shock. Follow-up care is a key part of your treatment and safety. Be sure to make and go to all appointments, and call your doctor if you are having problems. It's also a good idea to know your test results and keep a list of the medicines you take. Where can you learn more? Go to http://alissa-stacey.info/. Enter Z404 in the search box to learn more about \"Learning About ICD Shocks. \" Current as of: December 6, 2017 Content Version: 11.7 © 5364-2984 [employer]. Care instructions adapted under license by [employer] (which disclaims liability or warranty for this information). If you have questions about a medical condition or this instruction, always ask your healthcare professional. [address] any warranty or liability for your use of this information. Learning About ICDs (Implantable Cardioverter-Defibrillators) What is an ICD (implantable cardioverter-defibrillator)? An ICD (implantable cardioverter-defibrillator) is a small, battery-powered device. It fixes serious changes in your heartbeat. ICDs are used in people who have had a life-threatening heart rhythm or are at high risk of having one. The ICD is placed under the skin of the chest. It's attached to one or two wires (called leads). Most of the time, these leads go into the heart through a vein. How does an ICD work? An ICD is always checking your heart rate and rhythm. If the ICD detects a life-threatening, rapid heart rhythm, it tries to slow the rhythm back to normal using electrical pulses. If the bad rhythm doesn't stop, the ICD sends an electric shock to the heart. This restores a normal rhythm. The device then goes back to its watchful mode. Some ICDs also can fix a heart rate that is too slow. The ICD does this without using a shock. It can send out electrical pulses to speed up a heart rate that is too slow. Your doctor will check the ICD regularly to make sure it is working right and isn't causing any problems. Your doctor will also check the battery to see if it needs to be replaced. How is an ICD placed? Your doctor will put the ICD under the skin in your chest during minor surgery. You will likely have medicine to make you feel relaxed and sleepy during the surgery. Your doctor makes a small cut (incision) in your upper chest. He or she puts one or two leads (wires) through the cut. Most of the time, the leads go into a large blood vessel in the upper chest. Then your doctor guides the leads through the blood vessel into your heart. Your doctor connects the leads to the ICD and places it in your chest. Then the incision is closed. Your doctor also programs the ICD. Most people spend the night in the hospital, just to make sure that the device is working and that there are no problems from the surgery. How does it feel to get a shock? The shock from an ICD hurts briefly. People feel it in different ways. It's been described as feeling like a punch in the chest. But the shock is a sign that the ICD is doing its job. It's there to save your life. You won't feel any pain if the ICD uses electrical pulses to fix a heart rate that is too fast or too slow. There's no way to know how often a shock might occur. It might never happen. Not knowing when or if a shock might happen may make you nervous. Knowing what to do if you get shocked can help. Ask your doctor for an action plan. This plan will guide you step-by-step if a shock happens. What else should you know? You can live a normal life with your ICD. Here are a few tips for living well with your ICD. · Avoid strong magnetic and electrical fields. These can keep your device from working right. Most office equipment and home appliances are safe to use. Check with your doctor about which things you should use with caution and which you should stay away from. · Be sure that any doctor, dentist, or other health professional you see knows that you have an ICD. · Always carry a card that tells what kind of device you have. Wear medical alert jewelry that says you have an ICD. You can buy this at most [employer]. · If you do get a shock, follow your action plan for what to do. · You can lead an active life with an ICD. Ask your doctor what sort of activity and intensity is safe for you. As you plan for your future and your end of life, be sure to include plans for your ICD. You can make the decision to turn off your ICD as part of the medical treatment you want at the end of life. Follow-up care is a key part of your treatment and safety. Be sure to make and go to all appointments, and call your doctor if you are having problems.  It's also a good idea to know your test results and keep a list of the medicines you take. Where can you learn more? Go to http://alissa-stacey.info/. Enter H311 in the search box to learn more about \"Learning About ICDs (Implantable Cardioverter-Defibrillators). \" 
Current as of: December 6, 2017 Content Version: 11.7 © 0827-7112 [employer]. Care instructions adapted under license by [employer] (which disclaims liability or warranty for this information). If you have questions about a medical condition or this instruction, always ask your healthcare professional. [address] any warranty or liability for your use of this information. DISCHARGE SUMMARY from Nurse PATIENT INSTRUCTIONS: 
 
 
F-face looks uneven A-arms unable to move or move unevenly S-speech slurred or non-existent T-time-call 911 as soon as signs and symptoms begin-DO NOT go Back to bed or wait to see if you get better-TIME IS BRAIN. Warning Signs of HEART ATTACK Call 911 if you have these symptoms: 
? Chest discomfort. Most heart attacks involve discomfort in the center of the chest that lasts more than a few minutes, or that goes away and comes back. It can feel like uncomfortable pressure, squeezing, fullness, or pain. ? Discomfort in other areas of the upper body. Symptoms can include pain or discomfort in one or both arms, the back, neck, jaw, or stomach. ? Shortness of breath with or without chest discomfort. ? Other signs may include breaking out in a cold sweat, nausea, or lightheadedness. Don't wait more than five minutes to call [address]! Fast action can save your life.  Calling 911 is almost always the fastest way to get lifesaving treatment. Emergency Medical Services staff can begin treatment when they arrive  up to an hour sooner than if someone gets to the hospital by car. The discharge information has been reviewed with the patient. The patient verbalized understanding. Discharge medications reviewed with the patient and appropriate educational materials and side effects teaching were provided. ___________________________________________________________________________________________________________________________________ [employer] Announcement We are excited to announce that we are making your provider's discharge notes available to you in [employer]. You will see these notes when they are completed and signed by the physician that discharged you from your recent hospital stay. If you have any questions or concerns about any information you see in [employer], please call the Health Information Department where you were seen or reach out to your Primary Care Provider for more information about your plan of care. Introducing [facility] HE[facility] SERVICES! Rahul Valladares introduces [employer] patient portal. Now you can access parts of your medical record, email your doctor's office, and request medication refills online. 1. In your internet browser, go to [email or web address] 2. Click on the First Time User? Click Here link in the Sign In box. You will see the New Member Sign Up page. 3. Enter your [employer] Access Code exactly as it appears below. You will not need to use this code after youve completed the sign-up process. If you do not sign up before the expiration date, you must request a new code. · [employer] Access Code: [email or web address] Expires: 11/21/2018  5:22 AM 
 
4. Enter the last four digits of your Social Security Number (xxxx) and Date of Birth (mm/dd/yyyy) as indicated and click Submit. You will be taken to the next sign-up page. 5. Create a [employer] ID. This will be your [employer] login ID and cannot be changed, so think of one that is secure and easy to remember. 6. Create a [employer] password. You can change your password at any time. 7. Enter your Password Reset Question and Answer. This can be used at a later time if you forget your password. 8. Enter your e-mail address. You will receive e-mail notification when new information is available in [address] 9. Click Sign Up. You can now view and download portions of your medical record. 10. Click the Download Summary menu link to download a portable copy of your medical information. If you have questions, please visit the Frequently Asked Questions section of the [employer] website. Remember, [employer] is NOT to be used for urgent needs. For medical emergencies, dial 911. Now available from your iPhone and Android! Introducing Christopher Silverio As a [facility] patient, I wanted to make you aware of our electronic visit tool called Christopher [place]. [facility] 24/7 allows you to connect within minutes with a medical provider 24 hours a day, seven days a week via a mobile device or tablet or logging into a secure website from your computer. You can access Christopher Silverio from anywhere in the [place]. A virtual visit might be right for you when you have a simple condition and feel like you just dont want to get out of bed, or cant get away from work for an appointment, when your regular [facility] provider is not available (evenings, weekends or holidays), or when youre out of town and need minor care. Electronic visits cost only $49 and if the [facility] 24/7 provider determines a prescription is needed to treat your condition, one can be electronically transmitted to a nearby pharmacy*. Please take a moment to enroll today if you have not already done so. The enrollment process is free and takes just a few minutes.   To enroll, please download the [employer] 24/7 leann to your tablet or phone, or visit [email or web address] to enroll on your computer. And, as an [address] [place] [address] patient with a [employer] account, the results of your visits will be scanned into your electronic medical record and your primary care provider will be able to view the scanned results. We urge you to continue to see your regular [facility] provider for your ongoing medical care. And while your primary care provider may not be the one available when you seek a [employer] virtual visit, the peace of mind you get from getting a real diagnosis real time can be priceless. For more information on [employer], view our Frequently Asked Questions (FAQs) at [email or web address]. Sincerely, 
 
Edward Wood MD 
Chief Medical Officer Leeanne Romina Shetty *:  certain medications cannot be prescribed via [employer] Providers Seen During Your Hospitalization Provider Specialty Primary office phone Paul Jain MD Cardiology [phone] Your Primary Care Physician (PCP) Primary Care Physician Office Phone Office Fax Zohra Stephan [phone] You are allergic to the following Allergen Reactions Equine Protein Unknown (comments) \"horse serum\" \"found in td\" Sulfa (Sulfonamide Antibiotics) Swelling Recent Documentation Height Weight BMI Smoking Status 1.778 m 72.6 kg 22.97 kg/m2 Never Smoker Emergency Contacts Name Discharge Info Relation Home Work Mobile Sharon Mcadams DISCHARGE CAREGIVER [3] Spouse [3] [phone] Patient Belongings The following personal items are in your possession at time of discharge: 
  Dental Appliances: None  Visual Aid: Glasses, With patient   Hearing Aids/Status: Bilateral  Home Medications: Kept at bedside   Jewelry: None  Clothing: At bedside    Other Valuables: Cell Phone Please provide this summary of care documentation to your next provider. Signatures-by signing, you are acknowledging that this After Visit Summary has been reviewed with you and you have received a copy. Patient Signature:  ____________________________________________________________ Date:  ____________________________________________________________  
  
Wu Naas Provider Signature:  ____________________________________________________________ Date:  ____________________________________________________________

## 2018-08-30 NOTE — H&P
[facility] Cardiology/Electrophyiology Consult Date of  Admission: 8/30/2018  6:01 AM  
 
CC/Reason for admission: BiV ICD implant Audrey Oshea is a 75 y.o. male admitted for Cardiomyopathy, ischemic [I25.5]. 75 y.o. male with a past medical and cardiac history significant for CAD s/p CABG c/b ICM, LBBB, HTN, HLD, RA presents today for scheduled BiV ICD. Pt reports he has been feeling fatigue and NESBITT for months to probably even years. He denies recent chest pain, no passing out or near passing out spells, palpitations, leg swelling or weight gain. Pt ICM is a chronic problem, worsening on OMT, no aggravating or alleviating factors, with symptoms as noted above. Cardiac PMH: (Old records have been reviewed and summarized below) CONCLUSION:   
1.  Coronary artery disease with severe ischemic cardiomyopathy, EF is 20% with wall motion abnormalities.  The LIMA to the LAD is atretic. 2.  Vein graft to a diagonal was diseased, but patent. 3.  Vein graft to an OM is diseased, but patent. 4.  Vein graft to the PDA is occluded.   
 
Patient Active Problem List  
Diagnosis Code  RA (rheumatoid arthritis) ([facility]) M06.9  Osteoarthritis M19.90  
 S/P total knee arthroplasty X02.494  CAD (coronary artery disease) I25.10  Hyperlipidemia E78.5  Hypertension I10  Thyroid nodule E04.1  S/P CABG (coronary artery bypass graft) Z95.1  Hypoxemia R09.02  
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  
 Hernia, epigastric K43.9  Raynaud's syndrome without gangrene I73.00  
 Ischemic cardiomyopathy I25.5 Past Medical History:  
Diagnosis Date  Aspirin long-term use   
 pt states he does not take plavix and hasn't for approximately a year  CAD (coronary artery disease) 2014  
 cabg x 4 vessels  Decreased cardiac ejection fraction 48% per stress test 11/9/16  GERD (gastroesophageal reflux disease)  Heart failure ([facility]  [place] (hard of hearing)   
 hearing aides  Hypertension  LBBB (left bundle branch block) 11/08/2016  
 new  MI, old 2014  RA (rheumatoid arthritis) ([facility]  S/P CABG x 4 2014  Sleep apnea   
 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  
Procedure Laterality Date  HX CARPAL TUNNEL RELEASE Bilateral   
 HX CATARACT REMOVAL Bilateral   
 with IOL  HX CERVICAL FUSION    
 HX CORONARY ARTERY BYPASS GRAFT  12/31/2014  
 x 4 - Dr. Joice Paget [address] [facility]  HX HEART CATHETERIZATION  2014  
 pt not sure if he had a stent, had CABG  
 HX HERNIA REPAIR Right   
 inguinal, ventral-after bypass  HX KNEE REPLACEMENT Left  HX LUMBAR FUSION Allergies Allergen Reactions  Equine Protein Unknown (comments) \"horse serum\" \"found in td\"  Sulfa (Sulfonamide Antibiotics) Swelling Family History Problem Relation Age of Onset  Heart Disease Father Current Facility-Administered Medications Medication Dose Route Frequency  ceFAZolin (ANCEF) 2 g/20 mL in sterile water IV syringe  2 g IntraVENous ONCE  
 bacitracin 50,000 Units, neomycin-polymyxin B  2 mL in sterile water irrigation 1,000 mL irrigation   Irrigation ONCE  
 bupivacaine-EPINEPHrine (PF) (SENSORCAINE PF) 0.5 %-1:200,000 injection 150 mg  30 mL SubCUTAneous ONCE  
 iopamidol (ISOVUE-370) 76 % injection 50 mL  50 mL IntraVENous RAD ONCE Review of Symptoms: A comprehensive ROS was performed with the pertinent positives and negatives mentioned in the HPI, all other systems reviewed and are negative Physical Exam 
Vitals:  
 08/30/18 0700 BP: 123/74 Pulse: (!) 52 Resp: 18 Temp: 98 °F (36.7 °C) SpO2: 99% Weight: 73.5 kg (162 lb) Height: 5' 10\" (1.778 m) Physical Exam: Gen: well appearing, well developed, NAD Eyes: Pupils equal, EOMI 
ENT: oropharynx clear, no oral lesions, normal dentition CV: RRR, distant S1, S2, no M/R/G, PMI not palpable, normal JVD, no carotid bruits, normal distal pulses, no SANDRA Pulm: CTAB, no accessory muscle uses, no wheezes, crackles GI: soft, NT, ND Cardiographics Telemetry:  
ECG (Indpendently visualized and interpreted): 
Echocardiogram:  
 
Labs:  
Recent Labs 08/30/18 
 6091 NA  140  
K  5.0 MG  2.1 BUN  18 CREA  0.89 GLU  89 WBC  4.6 HGB  14.6 HCT  43.8 PLT  181 Assessment:  
  
Active Problems: 
  Ischemic cardiomyopathy (5/10/2017) Overview: Apr 2018 - EF 25-30%, moderate MR, RVSP 27 (EF down from 40-45%) Plan:  
1. Ischemic CM, EF 20%, NYHA class III, LBBB with QRS duration 150 msec: scheduled admission for BiV ICD, answered all questions and concerns, Pt wishes to proceed. Jessenia Brown MD, MS 
Cardiology/Electrophysiology

## 2018-08-30 NOTE — PROGRESS NOTES
Report received from [address] [place] [address]. Procedural findings communicated. Intra procedural  medication administration reviewed. Progression of care discussed. Patient received into [facility] [place] [facility] 7 post procedure. Incision site without bleeding or swelling yes Dressing dry and intact yes Patient instructed to limit movement to left upper extremity Routine post procedural vital signs and site assessment initiated yes

## 2018-08-30 NOTE — PROGRESS NOTES
Bedside and Verbal shift change report given to self and Mai Gillette (oncoming nurse) by Margarie Siemens, RN (offgoing nurse). Report included the following information SBAR, Kardex, Intake/Output and MAR.

## 2018-08-30 NOTE — ANESTHESIA POSTPROCEDURE EVALUATION
Post-Anesthesia Evaluation and Assessment Patient: Kelvin Corona MRN: [id number]  SSN: [id number] YOB: 1945  Age: 75 y.o. Sex: male Cardiovascular Function/Vital Signs Visit Vitals  /70  Pulse 62  Temp 36.4 °C (97.6 °F)  Resp 25  
 Ht 5' 10\" (1.778 m)  Wt 73.5 kg (162 lb)  SpO2 98%  BMI 23.24 kg/m2 Patient is status post total IV anesthesia anesthesia for Procedure(s): [address]. Nausea/Vomiting: None Postoperative hydration reviewed and adequate. Pain: 
Pain Scale 1: Numeric (0 - 10) (08/30/18 1014) Pain Intensity 1: 0 (08/30/18 1014) Managed Neurological Status:  
Neuro (WDL): Within Defined Limits (08/30/18 1014) At baseline Mental Status and Level of Consciousness: Arousable Pulmonary Status:  
O2 Device: Nasal cannula (08/30/18 1025) Adequate oxygenation and airway patent Complications related to anesthesia: None Post-anesthesia assessment completed. No concerns Signed By: Edmund Negro MD   
 August 30, 2018

## 2018-08-31 VITALS
WEIGHT: 160.1 LBS | HEIGHT: 70 IN | BODY MASS INDEX: 22.92 KG/M2 | HEART RATE: 76 BPM | DIASTOLIC BLOOD PRESSURE: 65 MMHG | RESPIRATION RATE: 18 BRPM | TEMPERATURE: 98.1 F | SYSTOLIC BLOOD PRESSURE: 106 MMHG | OXYGEN SATURATION: 95 %

## 2018-08-31 PROCEDURE — 74011250637 HC RX REV CODE- 250/637: Performed by: INTERNAL MEDICINE

## 2018-08-31 PROCEDURE — 99218 HC RM OBSERVATION: CPT

## 2018-08-31 PROCEDURE — 74011250636 HC RX REV CODE- 250/636: Performed by: INTERNAL MEDICINE

## 2018-08-31 RX ORDER — HYDROCODONE BITARTRATE AND ACETAMINOPHEN 5; 325 MG/1; MG/1
1 TABLET ORAL
Qty: 10 TAB | Refills: 0 | Status: SHIPPED
Start: 2018-08-31 | End: 2019-09-18 | Stop reason: ALTCHOICE

## 2018-08-31 RX ADMIN — LOSARTAN POTASSIUM 25 MG: 25 TABLET, FILM COATED ORAL at 08:50

## 2018-08-31 RX ADMIN — Medication 2 G: at 00:01

## 2018-08-31 RX ADMIN — LEFLUNOMIDE 20 MG: 20 TABLET ORAL at 08:50

## 2018-08-31 RX ADMIN — METOPROLOL TARTRATE 25 MG: 25 TABLET, FILM COATED ORAL at 08:50

## 2018-08-31 RX ADMIN — Medication 10 ML: at 06:12

## 2018-08-31 RX ADMIN — ASPIRIN 81 MG: 81 TABLET, COATED ORAL at 08:50

## 2018-08-31 RX ADMIN — HYDROCODONE BITARTRATE AND ACETAMINOPHEN 1 TABLET: 5; 325 TABLET ORAL at 06:19

## 2018-08-31 NOTE — PROGRESS NOTES
Problem: Falls - Risk of 
Goal: *Absence of Falls Document [facility] Risk and appropriate interventions in the flowsheet. Outcome: Progressing Towards Goal 
Fall Risk Interventions: 
  
 
  
 
Medication Interventions: Teach patient to arise slowly, Patient to call before getting OOB

## 2018-08-31 NOTE — DISCHARGE SUMMARY
[facility] Cardiology Discharge Summary     Patient ID:  Chiara Mariee  [id number]  66 y.o.  1945    Admit date: 8/30/2018    Discharge date:  8/31/2018    Admitting Physician: Laura Del Angel MD     Discharge Physician: Liliane Davis NP/Dr. Carl Stovall    Admission Diagnoses: Cardiomyopathy, ischemic [I25.5]    Discharge Diagnoses:    Diagnosis    Cardiomyopathy [facility])    CAD (coronary artery disease)    Hyperlipidemia    Hypertension       Cardiology Procedures this admission:  ICD implantation, CXR  Consults: None    Hospital Course: Patient was seen at the office of [facility] by Dr. Gopi Solares for management of chronic systolic heart failure and was subsequently scheduled for an AM Admission ICD implantation at [facility] on 8/30/18. Patient was taken to the EP lab and underwent implantation [place] biventricular ICD by Dr. Gopi Solares. Patient tolerated the procedure well and was taken to the telemetry floor for recovery. Follow up chest xray showed no pneumothorax. The following morning patient was up feeling well without any complaints of chest pain, shortness of breath, or palpitations. Patient's left subclavian cath site was clean, dry and intact without hematoma. Patient's labs were WNL. Patient was seen and examined by Dr. Carl Stovall and determined stable and ready for discharge. Patient was instructed on the importance of medication compliance and outpatient follow up. Patient has been scheduled for follow-up with [facility] -- device clinic in 10-14 days. DISPOSITION: Patient has been instructed to keep affected arm below shoulder level for the next 4 weeks or until cleared by doctor. The arm sling should be worn while sleeping. The dressing will be removed at follow-up. The incision site must be kept clean and dry. The patient may shower in a few days. Lotions, powders, or creams should be avoided as these can increase the risk of infection.   The affected arm should not be used for any pushing, pulling, or lifting until cleared by doctor. Driving is prohibited until cleared by doctor in a follow up appointment. Any signs of infection including increased redness, suspicious drainage, or unexplained fever should be reported to the doctor immediately by calling [phone]. Discharge Exam:  Patient has been seen by Dr. Gabi Day: see his progress note for exam details.     Visit Vitals    /67 (BP 1 Location: Right arm, BP Patient Position: At rest)    Pulse 76    Temp 98.8 °F (37.1 °C)    Resp 18    Ht 5' 10\" (1.778 m)    Wt 73.5 kg (162 lb)    SpO2 96%    BMI 23.24 kg/m2         Recent Results (from the past 24 hour(s))   METABOLIC PANEL, BASIC    Collection Time: 08/30/18  6:42 AM   Result Value Ref Range    Sodium 140 136 - 145 mmol/L    Potassium 5.0 3.5 - 5.1 mmol/L    Chloride 106 98 - 107 mmol/L    CO2 26 21 - 32 mmol/L    Anion gap 8 7 - 16 mmol/L    Glucose 89 65 - 100 mg/dL    BUN 18 8 - 23 MG/DL    Creatinine 0.89 0.8 - 1.5 MG/DL    GFR est AA >60 >60 ml/min/1.73m2    GFR est non-AA >60 >60 ml/min/1.73m2    Calcium 9.0 8.3 - 10.4 MG/DL   CBC W/O DIFF    Collection Time: 08/30/18  6:42 AM   Result Value Ref Range    WBC 4.6 4.3 - 11.1 K/uL    RBC 4.58 4.23 - 5.6 M/uL    HGB 14.6 13.6 - 17.2 g/dL    HCT 43.8 41.1 - 50.3 %    MCV 95.6 79.6 - 97.8 FL    MCH 31.9 26.1 - 32.9 PG    MCHC 33.3 31.4 - 35.0 g/dL    RDW 13.3 %    PLATELET 271 854 - 717 K/uL    MPV 9.0 (L) 9.4 - 12.3 FL    ABSOLUTE NRBC 0.00 0.0 - 0.2 K/uL   PROTHROMBIN TIME + INR    Collection Time: 08/30/18  6:42 AM   Result Value Ref Range    Prothrombin time 13.3 11.5 - 14.5 sec    INR 1.1     MAGNESIUM    Collection Time: 08/30/18  6:42 AM   Result Value Ref Range    Magnesium 2.1 1.8 - 2.4 mg/dL   EKG, 12 LEAD, INITIAL    Collection Time: 08/30/18  6:53 AM   Result Value Ref Range    Ventricular Rate 61 BPM    Atrial Rate 61 BPM    P-R Interval 206 ms    QRS Duration 158 ms    Q-T Interval 486 ms    QTC Calculation (Bezet) 489 ms    Calculated P Axis 44 degrees    Calculated R Axis -45 degrees    Calculated T Axis 24 degrees    Diagnosis       Sinus rhythm with Premature supraventricular complexes  Left axis deviation  Left bundle branch block  Abnormal ECG  When compared with ECG of 23-MAY-2018 08:07,  Premature ventricular complexes are no longer Present  Premature supraventricular complexes are now Present  Confirmed by DELVIN ROSS (), Hyde Tonie ([id number]) on 8/30/2018 9:04:56 AM     EKG, 12 LEAD, INITIAL    Collection Time: 08/30/18 12:02 PM   Result Value Ref Range    Ventricular Rate 60 BPM    Atrial Rate 60 BPM    P-R Interval 234 ms    QRS Duration 136 ms    Q-T Interval 462 ms    QTC Calculation (Bezet) 462 ms    Calculated P Axis 8 degrees    Calculated R Axis -103 degrees    Calculated T Axis 20 degrees    Diagnosis       AV dual-paced rhythm with prolonged AV conduction  Abnormal ECG  When compared with ECG of 30-AUG-2018 06:53,  Electronic ventricular pacemaker has replaced Sinus rhythm  Confirmed by Suzi Blanc MD (), Charito Best ([id number]) on 8/30/2018 2:22:20 PM           Patient Instructions:   Current Discharge Medication List      START taking these medications    Details   HYDROcodone-acetaminophen (NORCO) 5-325 mg per tablet Take 1 Tab by mouth every four (4) hours as needed. Max Daily Amount: 6 Tabs. Qty: 10 Tab, Refills: 0    Associated Diagnoses: ICD (implantable cardioverter-defibrillator) battery depletion         CONTINUE these medications which have NOT CHANGED    Details   spironolactone (ALDACTONE) 25 mg tablet 1/2 tablet daily  Qty: 45 Tab, Refills: 3      metoprolol tartrate (LOPRESSOR) 25 mg tablet Take 1 Tab by mouth two (2) times a day. Qty: 180 Tab, Refills: 3      losartan (COZAAR) 25 mg tablet Take 1 Tab by mouth daily. Qty: 90 Tab, Refills: 3      atorvastatin (LIPITOR) 40 mg tablet Take 1 Tab by mouth daily.   Qty: 90 Tab, Refills: 3      leflunomide (ARAVA) 20 mg tablet Take 20 mg by mouth every morning. Indications: RHEUMATOID ARTHRITIS      aspirin delayed-release 81 mg tablet Take 81 mg by mouth every morning. Indications: continue      multivitamin (ONE A DAY) tablet Take 1 Tab by mouth daily. Indications: hold until after surgery      nitroglycerin (NITROSTAT) 0.4 mg SL tablet 1 Tab by SubLINGual route every five (5) minutes as needed for Chest Pain. Indications: bring on the dos.   Qty: 1 Bottle, Refills: 11               Signed:  Mariela Sapp NP  8/31/2018 2:09 AM

## 2018-08-31 NOTE — PROGRESS NOTES
Bedside and Verbal shift change report given to gM Andrews (oncoming nurse) by self and Silvestre Silver (offgoing nurse). Report included the following information SBAR, Kardex, Intake/Output and MAR.

## 2018-08-31 NOTE — PROGRESS NOTES
Medicare Outpatient Observation Notice provided to the patient. Oral explanation was provided and all questions answered. Signed document placed in the medical record under media tab. Copy to patient

## 2018-08-31 NOTE — PROGRESS NOTES
Discharge instructions reviewed with the patient and daughter. Prescriptions given for norco and med info sheets provided for all new medications. Opportunity for questions provided. Patient and daughter voiced understanding of all discharge instructions.   
 
Iv and monitor off at d/c

## 2018-08-31 NOTE — DISCHARGE INSTRUCTIONS
DISPOSITION: Patient has been instructed to keep affected arm below shoulder level for the next 4 weeks or until cleared by doctor. The arm sling should be worn while sleeping. The dressing will be removed at follow-up. The incision site must be kept clean and dry. The patient may shower in a few days. Lotions, powders, or creams should be avoided as these can increase the risk of infection. The affected arm should not be used for any pushing, pulling, or lifting until cleared by doctor. Driving is prohibited until cleared by doctor in a follow up appointment. Any signs of infection including increased redness, suspicious drainage, or unexplained fever should be reported to the doctor immediately by calling [phone]. PACEMAKER INSTRUCTIONS SHEET  · Keep your incision dry for 10 days. DO NOT put salves, ointments, and/or lotions on the incision. Only take a tub bath during this time; NO showers. · The pieces of tape on the incision will come off by themselves when you begin washing the site. Please do not pull or tear them off. · You may use your pacemaker arm; but DO NOT raise the arm higher than your shoulder for the first two weeks to prevent the pacemaker lead from moving. DO NOT immobilize your pacemaker arm. · Call us IMMEDIATELY if you develop fever, pain, redness, and/or drainage at the pacemaker arm. · Do not lift more than 10 pounds for 2 weeks and 20 pounds for 1 month. · Microwaves WILL NOT harm your pacemaker. Warning does not apply to you. · Avoid activities which can reprogram your pacemaker such as arc welding, ham radios, and tanning booths. At airports, always show your pacemaker identification card. You may walk through the metal detector, but do not allow the hand held wand near your pacemaker. Do not have a MRI or NMR scan without talking to your cardiologist.  · Your pacemakers function will be evaluated over the telephone. Within 3 weeks you should receive a schedule.  If you do not receive a schedule, or if you have questions, you may call . · Remove the battery from the transmitter after each use. Always keep a spare 9 volt battery. · The pacemaker battery is tested by the heart rate with the magnet applied. This test is done each time you transmit your ECG so it is very important that you follow your schedule. · Carry your pacemaker identification card at all times. Within 6 weeks, you should receive your permanent card. Keep your temporary card as a spare. Call  if you do not receive your card or if you lose your card. · Always show the doctor or dentist your pacemaker identification card. · If you have questions about your pacemaker or office appointment, please call the office of  at  Following the pacemaker implant, you will need to return to the clinic to see your doctor within 1 week. If you did not receive an appointment, please call       Implantable Cardioverter-Defibrillator Placement: What to Expect at [address] [place] [address] cardioverter-defibrillator (ICD) placement is surgery to put an ICD in your chest. An ICD is a small, battery-powered device that fixes life-threatening changes in your heartbeat. If the ICD detects a life-threatening rapid heart rhythm, it tries to slow the rhythm back to normal using electrical pulses. If the dangerous rhythm does not stop, the ICD sends an electric shock to the heart to restore a normal rhythm. The device then goes back to its watchful mode. Your chest may be sore where the doctor made the cut (incision) and put in the ICD. You also may have a bruise and mild swelling. These symptoms usually get better in 1 to 2 weeks. You may feel a hard ridge along the incision. This usually gets softer in the months after surgery. You probably will be able to see and feel the outline of the ICD under your skin. You will probably be able to go back to work or your usual routine 1 to 2 weeks after surgery.  Your doctor will talk to you about how often you will need to have the ICD checked. When you have an ICD, it is important to avoid electrical devices that can stop your ICD from working right. Check with your doctor about what you need to stay away from, what you need to use with care, and what is okay to use. You will need to stay away from things with strong magnetic and electrical fields such as MRI machines (unless your ICD is safe for an MRI), welding equipment, and power generators. You can use a cell phone, but keep it at least 6 inches away from your ICD. You can safely use most household and office electronics. These include kitchen appliances, electric power tools, and computers. This care sheet gives you a general idea about how long it will take for you to recover. But each person recovers at a different pace. Follow the steps below to get better as quickly as possible. How can you care for yourself at home? Activity    · Rest when you feel tired. Getting enough sleep will help you recover.     · Try to walk each day. Start by walking a little more than you did the day before. Bit by bit, increase the amount you walk. Walking boosts blood flow and helps prevent pneumonia and constipation.     · For 4 to 6 weeks:  ¨ Avoid activities that strain your chest or upper arm muscles. This includes pushing a  or vacuum, mopping floors, swimming, or swinging a golf club or tennis racquet. ¨ Do not raise your arm, on the side of your body where the ICD is located, above shoulder level. ¨ Avoid strenuous activities, such as bicycle riding, jogging, weight lifting, or heavy aerobic exercise. ¨ Avoid lifting anything that would make you strain. This may include heavy grocery bags and milk containers, a heavy briefcase or backpack, cat litter or dog food bags, or a child.     · Ask your doctor when you can drive again.     · You will probably need to take about 1 to 2 weeks off from work.  It depends on the type of work you do and how you feel.     · Ask your doctor when it is okay for you to have sex. Diet    · You can eat your normal diet. If your stomach is upset, try bland, low-fat foods like plain rice, broiled chicken, toast, and yogurt.     · Drink plenty of fluids (unless your doctor tells you not to). Medicines    · Your doctor will tell you if and when you can restart your medicines. He or she will also give you instructions about taking any new medicines.     · If you take blood thinners, such as warfarin (Coumadin), clopidogrel (Plavix), or aspirin, be sure to talk to your doctor. He or she will tell you if and when to start taking those medicines again. Make sure that you understand exactly what your doctor wants you to do.     · Take pain medicines exactly as directed. ¨ If the doctor gave you a prescription medicine for pain, take it as prescribed. ¨ If you are not taking a prescription pain medicine, ask your doctor if you can take an over-the-counter medicine. ¨ Do not take aspirin, ibuprofen (Advil, Motrin), naproxen (Aleve), or other nonsteroidal anti-inflammatory drugs (NSAIDs) unless your doctor says it is okay.     · If you think your pain medicine is making you sick to your stomach:  ¨ Take your medicine after meals (unless your doctor has told you not to). ¨ Ask your doctor for a different pain medicine.     · If your doctor prescribed antibiotics, take them as directed. Do not stop taking them just because you feel better. You need to take the full course of antibiotics. Incision care    · Keep the area clean and dry.     · Ask your doctor when you can shower or take a bath.     · If you have strips of tape on the incision, leave the tape on for a week or until it falls off.     · Wash the area daily with warm, soapy water, and pat it dry. Don't use hydrogen peroxide or alcohol, which can slow healing. You may cover the area with a gauze bandage if it weeps or rubs against clothing.    Exercise    · Check with your doctor before you start an exercise program. Your doctor may recommend that you work with a physical therapist to learn how to exercise safely with an ICD. Other instructions    · Carry your ICD identification card with you at all times. The card should include the ICD  and model information.     · Wear medical alert jewelry that states you have an ICD. You can buy this at most drugstores.     · Have your ICD checked as often as your doctor recommends. In some cases, this may be done over the phone or the Internet. Karlene Palm will give you instructions about how to do this.     · Talk with your doctor about the possibility of turning off the ICD at the end of life. You can put your wishes about the ICD in an advance directive. Follow-up care is a key part of your treatment and safety. Be sure to make and go to all appointments, and call your doctor if you are having problems. It's also a good idea to know your test results and keep a list of the medicines you take. When should you call for help? Call 911 anytime you think you may need emergency care. For example, call if:    · You passed out (lost consciousness).     · You have trouble breathing.    Call your doctor now or seek immediate medical care if:    · You receive a shock from your ICD.     · You are dizzy or lightheaded, or you feel like you may faint.     · You have pain that does not get better after you take pain medicine.     · You hear an alarm or feel a vibration from your ICD.     · You have loose stitches, or your incision comes open.     · Bright red blood has soaked through the bandage over your incision.     · You have signs of infection, such as:  ¨ Increased pain, swelling, warmth, or redness. ¨ Red streaks leading from the incision. ¨ Pus draining from the incision. ¨ A fever.    Watch closely for changes in your health, and be sure to contact your doctor if you have any problems. Where can you learn more?   Go to http://alissa-stacey.info/. Enter K184 in the search box to learn more about \"Implantable Cardioverter-Defibrillator Placement: What to Expect at Home. \"  Current as of: December 6, 2017  Content Version: 11.7  © 6047-6097 [email or web address]. Care instructions adapted under license by [employer] (which disclaims liability or warranty for this information). If you have questions about a medical condition or this instruction, always ask your healthcare professional. [address] any warranty or liability for your use of this information. Learning About ICD Shocks  What are ICDs and ICD shocks? An implantable cardioverter-defibrillator (ICD) is a device that is placed under the skin of your chest. It has thin wires (called leads). Most of the time, these leads are placed inside the heart. The ICD is always checking your heart. If it detects a life-threatening rapid heart rhythm, it tries to slow the rhythm to get it back to normal. If the dangerous rhythm does not stop, the ICD sends an electric shock to the heart to restore a normal rhythm. The device then goes back to its watchful mode. The idea of living with an ICD and getting shocked worries some people. The shock can be uncomfortable. It may feel like you are being kicked in the chest. For many people, getting a shock can cause anxiety and depression. It's normal to be worried about living with an ICD. After all, you don't know when a shock might occur, and a shock could be a reminder that your heart is not as healthy as it could be. But an ICD is an important part of your treatment. It can save your life. If you take a few simple steps, you can feel better about having an ICD. How can you get over your fears about the ICD? Know your ICD treatment  · Learn how the ICD works, what it does, and how it keeps you safe. This can help reduce any anxiety you may feel.   · Keep your regular doctor appointments. Your doctor:  ¨ Sets both the rate at which a shock will occur and the level of shock needed to restore your heart to a normal rate. ¨ Checks to see whether the ICD has given you any shocks since your last visit. This helps your doctor know if your medicines need to be adjusted. ¨ Checks the ICD battery and replaces it as needed. · Talk with others who have an ICD. Ask them if they have been shocked and what it was like. Ask them how they cope with it. Talking with others can help you feel better. · Always carry your ICD identity card, a list of all the medicines you are taking, and your doctor's name and phone number. This will help you get the best possible treatment if you get a shock and need help. Make an action plan  Talk to your doctor about making an action plan for what to do if you get shocked. Here is an example:  · After one shock:  ¨ Call 911 or other emergency services right away if you feel bad or have symptoms like chest pain. ¨ Call your doctor soon if you feel fine right away after the shock. Your doctor may want to talk about the shock and schedule a follow-up visit. · If you get a second shock in a 24-hour period, call your doctor right away. Call even if you feel fine right away. Stay calm after a shock  · Follow the action plan you made with your doctor. · Do some breathing exercises. They may help you relax. ¨ Sit or lie in a comfortable position. Put one hand on your belly just below your ribs and the other hand on your chest.  ¨ Take a deep breath in through your nose, and let your belly push your hand out. Your chest should not move. ¨ Breathe out through pursed lips as if you were whistling. Feel the hand on your belly go in, and use it to push all the air out. ¨ Breathe in and out like this until you feel more relaxed. · Keep a good attitude. When you've had a shock, you may question how healthy you are or worry about getting another shock.  But try to focus on the positive things in your life, like loving relationships, pleasant activities, or good friends. · Don't make changes in what you do. You may want to avoid an action because you think it caused the shock. But a shock can occur at any time, and you can't prevent shocks by your actions alone. Don't stop doing things you enjoy to try to avoid a shock. Follow-up care is a key part of your treatment and safety. Be sure to make and go to all appointments, and call your doctor if you are having problems. It's also a good idea to know your test results and keep a list of the medicines you take. Where can you learn more? Go to http://alissa-stacey.info/. Enter K012 in the search box to learn more about \"Learning About ICD Shocks. \"  Current as of: December 6, 2017  Content Version: 11.7  © 9880-1319 [employer]. Care instructions adapted under license by [employer] (which disclaims liability or warranty for this information). If you have questions about a medical condition or this instruction, always ask your healthcare professional. [place] [address] any warranty or liability for your use of this information. Learning About ICDs (Implantable Cardioverter-Defibrillators)  What is an ICD (implantable cardioverter-defibrillator)? An ICD (implantable cardioverter-defibrillator) is a small, battery-powered device. It fixes serious changes in your heartbeat. ICDs are used in people who have had a life-threatening heart rhythm or are at high risk of having one. The ICD is placed under the skin of the chest. It's attached to one or two wires (called leads). Most of the time, these leads go into the heart through a vein. How does an ICD work? An ICD is always checking your heart rate and rhythm. If the ICD detects a life-threatening, rapid heart rhythm, it tries to slow the rhythm back to normal using electrical pulses.  If the bad rhythm doesn't stop, the ICD sends an electric shock to the heart. This restores a normal rhythm. The device then goes back to its watchful mode. Some ICDs also can fix a heart rate that is too slow. The ICD does this without using a shock. It can send out electrical pulses to speed up a heart rate that is too slow. Your doctor will check the ICD regularly to make sure it is working right and isn't causing any problems. Your doctor will also check the battery to see if it needs to be replaced. How is an ICD placed? Your doctor will put the ICD under the skin in your chest during minor surgery. You will likely have medicine to make you feel relaxed and sleepy during the surgery. Your doctor makes a small cut (incision) in your upper chest. He or she puts one or two leads (wires) through the cut. Most of the time, the leads go into a large blood vessel in the upper chest. Then your doctor guides the leads through the blood vessel into your heart. Your doctor connects the leads to the ICD and places it in your chest. Then the incision is closed. Your doctor also programs the ICD. Most people spend the night in the hospital, just to make sure that the device is working and that there are no problems from the surgery. How does it feel to get a shock? The shock from an ICD hurts briefly. People feel it in different ways. It's been described as feeling like a punch in the chest. But the shock is a sign that the ICD is doing its job. It's there to save your life. You won't feel any pain if the ICD uses electrical pulses to fix a heart rate that is too fast or too slow. There's no way to know how often a shock might occur. It might never happen. Not knowing when or if a shock might happen may make you nervous. Knowing what to do if you get shocked can help. Ask your doctor for an action plan. This plan will guide you step-by-step if a shock happens. What else should you know? You can live a normal life with your ICD.  Here are a few tips for living well with your ICD. · Avoid strong magnetic and electrical fields. These can keep your device from working right. Most office equipment and home appliances are safe to use. Check with your doctor about which things you should use with caution and which you should stay away from. · Be sure that any doctor, dentist, or other health professional you see knows that you have an ICD. · Always carry a card that tells what kind of device you have. Wear medical alert jewelry that says you have an ICD. You can buy this at most [employer]. · If you do get a shock, follow your action plan for what to do. · You can lead an active life with an ICD. Ask your doctor what sort of activity and intensity is safe for you. As you plan for your future and your end of life, be sure to include plans for your ICD. You can make the decision to turn off your ICD as part of the medical treatment you want at the end of life. Follow-up care is a key part of your treatment and safety. Be sure to make and go to all appointments, and call your doctor if you are having problems. It's also a good idea to know your test results and keep a list of the medicines you take. Where can you learn more? Go to http://alissa-stacey.info/. Enter X883 in the search box to learn more about \"Learning About ICDs (Implantable Cardioverter-Defibrillators). \"  Current as of: December 6, 2017  Content Version: 11.7  © 5732-7198 [employer]. Care instructions adapted under license by [employer] (which disclaims liability or warranty for this information). If you have questions about a medical condition or this instruction, always ask your healthcare professional. [place] [address] any warranty or liability for your use of this information.             DISCHARGE SUMMARY from Nurse    PATIENT INSTRUCTIONS:    After general anesthesia or intravenous sedation, for 24 hours or while taking prescription Narcotics:  · Limit your activities  · Do not drive and operate hazardous machinery  · Do not make important personal or business decisions  · Do  not drink alcoholic beverages  · If you have not urinated within 8 hours after discharge, please contact your surgeon on call. Report the following to your surgeon:  · Excessive pain, swelling, redness or odor of or around the surgical area  · Temperature over 100.5  · Nausea and vomiting lasting longer than 4 hours or if unable to take medications  · Any signs of decreased circulation or nerve impairment to extremity: change in color, persistent  numbness, tingling, coldness or increase pain  · Any questions    What to do at Home:  Recommended activity: No heavy lifting, pushing, pulling with the implant side for 2 months,     Please take medications as prescribed and please follow up with [facility]. *  Please give a list of your current medications to your Primary Care Provider. *  Please update this list whenever your medications are discontinued, doses are      changed, or new medications (including over-the-counter products) are added. *  Please carry medication information at all times in case of emergency situations. These are general instructions for a healthy lifestyle:    No smoking/ No tobacco products/ Avoid exposure to second hand smoke  Surgeon General's Warning:  Quitting smoking now greatly reduces serious risk to your health. Obesity, smoking, and sedentary lifestyle greatly increases your risk for illness    A healthy diet, regular physical exercise & weight monitoring are important for maintaining a healthy lifestyle    You may be retaining fluid if you have a history of heart failure or if you experience any of the following symptoms:  Weight gain of 3 pounds or more overnight or 5 pounds in a week, increased swelling in our hands or feet or shortness of breath while lying flat in bed.   Please call your doctor as soon as you notice any of these symptoms; do not wait until your next office visit. Recognize signs and symptoms of STROKE:    F-face looks uneven    A-arms unable to move or move unevenly    S-speech slurred or non-existent    T-time-call 911 as soon as signs and symptoms begin-DO NOT go       Back to bed or wait to see if you get better-TIME IS BRAIN. Warning Signs of HEART ATTACK     Call 911 if you have these symptoms:   Chest discomfort. Most heart attacks involve discomfort in the center of the chest that lasts more than a few minutes, or that goes away and comes back. It can feel like uncomfortable pressure, squeezing, fullness, or pain.  Discomfort in other areas of the upper body. Symptoms can include pain or discomfort in one or both arms, the back, neck, jaw, or stomach.  Shortness of breath with or without chest discomfort.  Other signs may include breaking out in a cold sweat, nausea, or lightheadedness. Don't wait more than five minutes to call 911 - MINUTES MATTER! Fast action can save your life. Calling 911 is almost always the fastest way to get lifesaving treatment. Emergency Medical Services staff can begin treatment when they arrive -- up to an hour sooner than if someone gets to the hospital by car. The discharge information has been reviewed with the patient. The patient verbalized understanding. Discharge medications reviewed with the patient and appropriate educational materials and side effects teaching were provided.   ___________________________________________________________________________________________________________________________________

## 2018-08-31 NOTE — PROGRESS NOTES
Pt admitted to 3rd floor tele for Cardiomyopathy. CM met with pt to discuss CM needs & DCP. Pt is A&Ox4. Pt is indep at home with all ADLS. Pt lives with spouse. Pt has no DME needs. Pt has no difficulty with obtaining medications or transport. DCP home with spouse. No further needs noted. CM to continue to monitor. Care Management Interventions PCP Verified by CM: Yes Last Visit to PCP: 03/12/18 Mode of Transport at Discharge: Other (see comment) (Spouse Sharon [phone]) Transition of Care Consult (CM Consult): Discharge Planning Discharge Durable Medical Equipment: No 
Physical Therapy Consult: No 
Occupational Therapy Consult: No 
Speech Therapy Consult: No 
Current Support Network: Lives with Spouse, Own Home Confirm Follow Up Transport: Family Plan discussed with Pt/Family/Caregiver: Yes Freedom of Choice Offered: Yes Discharge Location Discharge Placement: Home

## 2018-09-26 ENCOUNTER — HOSPITAL ENCOUNTER (OUTPATIENT)
Dept: LAB | Age: 73
Discharge: HOME | End: 2018-09-26
Attending: INTERNAL MEDICINE
Payer: MEDICARE

## 2018-09-26 DIAGNOSIS — I25.5 ISCHEMIC CARDIOMYOPATHY: ICD-10-CM

## 2018-09-26 LAB
ANION GAP SERPL CALC-SCNC: 8 MMOL/L
BUN SERPL-MCNC: 19 MG/DL (ref 8–23)
CALCIUM SERPL-MCNC: 8.5 MG/DL (ref 8.3–10.4)
CHLORIDE SERPL-SCNC: 107 MMOL/L (ref 98–107)
CO2 SERPL-SCNC: 27 MMOL/L (ref 21–32)
CREAT SERPL-MCNC: 0.8 MG/DL (ref 0.8–1.5)
GLUCOSE SERPL-MCNC: 81 MG/DL (ref 65–100)
POTASSIUM SERPL-SCNC: 4 MMOL/L (ref 3.5–5.1)
SODIUM SERPL-SCNC: 142 MMOL/L (ref 136–145)

## 2018-09-26 PROCEDURE — 80048 BASIC METABOLIC PNL TOTAL CA: CPT

## 2018-09-26 PROCEDURE — 36415 COLL VENOUS BLD VENIPUNCTURE: CPT

## 2019-04-30 PROBLEM — Z95.810 BIVENTRICULAR ICD (IMPLANTABLE CARDIOVERTER-DEFIBRILLATOR) IN PLACE: Status: ACTIVE | Noted: 2019-04-30

## 2019-09-18 ENCOUNTER — HOSPITAL ENCOUNTER (EMERGENCY)
Age: 74
Discharge: HOME | End: 2019-09-18
Attending: EMERGENCY MEDICINE
Payer: MEDICARE

## 2019-09-18 ENCOUNTER — APPOINTMENT (OUTPATIENT)
Dept: CT IMAGING | Age: 74
End: 2019-09-18
Attending: EMERGENCY MEDICINE
Payer: MEDICARE

## 2019-09-18 VITALS
SYSTOLIC BLOOD PRESSURE: 137 MMHG | WEIGHT: 160 LBS | DIASTOLIC BLOOD PRESSURE: 75 MMHG | TEMPERATURE: 98.1 F | HEIGHT: 70 IN | HEART RATE: 62 BPM | BODY MASS INDEX: 22.9 KG/M2 | OXYGEN SATURATION: 98 % | RESPIRATION RATE: 16 BRPM

## 2019-09-18 DIAGNOSIS — R10.9 FLANK PAIN: Primary | ICD-10-CM

## 2019-09-18 LAB
ALBUMIN SERPL-MCNC: 3.8 G/DL (ref 3.2–4.6)
ALBUMIN/GLOB SERPL: 1.1 {RATIO} (ref 1.2–3.5)
ALP SERPL-CCNC: 64 U/L (ref 50–136)
ALT SERPL-CCNC: 22 U/L (ref 12–65)
ANION GAP SERPL CALC-SCNC: 8 MMOL/L (ref 7–16)
AST SERPL-CCNC: 20 U/L (ref 15–37)
BASOPHILS # BLD: 0.1 K/UL (ref 0–0.2)
BASOPHILS NFR BLD: 1 % (ref 0–2)
BILIRUB SERPL-MCNC: 0.8 MG/DL (ref 0.2–1.1)
BUN SERPL-MCNC: 22 MG/DL (ref 8–23)
CALCIUM SERPL-MCNC: 9.6 MG/DL (ref 8.3–10.4)
CHLORIDE SERPL-SCNC: 107 MMOL/L (ref 98–107)
CO2 SERPL-SCNC: 27 MMOL/L (ref 21–32)
CREAT SERPL-MCNC: 1.11 MG/DL (ref 0.8–1.5)
DIFFERENTIAL METHOD BLD: ABNORMAL
EOSINOPHIL # BLD: 0.1 K/UL (ref 0–0.8)
EOSINOPHIL NFR BLD: 1 % (ref 0.5–7.8)
ERYTHROCYTE [DISTWIDTH] IN BLOOD BY AUTOMATED COUNT: 12.8 % (ref 11.9–14.6)
GLOBULIN SER CALC-MCNC: 3.5 G/DL (ref 2.3–3.5)
GLUCOSE SERPL-MCNC: 108 MG/DL (ref 65–100)
HCT VFR BLD AUTO: 42.1 % (ref 41.1–50.3)
HGB BLD-MCNC: 13.9 G/DL (ref 13.6–17.2)
IMM GRANULOCYTES # BLD AUTO: 0 K/UL (ref 0–0.5)
IMM GRANULOCYTES NFR BLD AUTO: 1 % (ref 0–5)
LYMPHOCYTES # BLD: 0.9 K/UL (ref 0.5–4.6)
LYMPHOCYTES NFR BLD: 11 % (ref 13–44)
MCH RBC QN AUTO: 31.4 PG (ref 26.1–32.9)
MCHC RBC AUTO-ENTMCNC: 33 G/DL (ref 31.4–35)
MCV RBC AUTO: 95.2 FL (ref 79.6–97.8)
MONOCYTES # BLD: 0.9 K/UL (ref 0.1–1.3)
MONOCYTES NFR BLD: 10 % (ref 4–12)
NEUTS SEG # BLD: 6.3 K/UL (ref 1.7–8.2)
NEUTS SEG NFR BLD: 77 % (ref 43–78)
NRBC # BLD: 0 K/UL (ref 0–0.2)
PLATELET # BLD AUTO: 170 K/UL (ref 150–450)
PMV BLD AUTO: 8.4 FL (ref 9.4–12.3)
POTASSIUM SERPL-SCNC: 4.2 MMOL/L (ref 3.5–5.1)
PROT SERPL-MCNC: 7.3 G/DL (ref 6.3–8.2)
RBC # BLD AUTO: 4.42 M/UL (ref 4.23–5.6)
SODIUM SERPL-SCNC: 142 MMOL/L (ref 136–145)
WBC # BLD AUTO: 8.2 K/UL (ref 4.3–11.1)

## 2019-09-18 PROCEDURE — 80053 COMPREHEN METABOLIC PANEL: CPT

## 2019-09-18 PROCEDURE — 99284 EMERGENCY DEPT VISIT MOD MDM: CPT | Performed by: EMERGENCY MEDICINE

## 2019-09-18 PROCEDURE — 74011250636 HC RX REV CODE- 250/636: Performed by: EMERGENCY MEDICINE

## 2019-09-18 PROCEDURE — 85025 COMPLETE CBC W/AUTO DIFF WBC: CPT

## 2019-09-18 PROCEDURE — 74011636320 HC RX REV CODE- 636/320: Performed by: EMERGENCY MEDICINE

## 2019-09-18 PROCEDURE — 74177 CT ABD & PELVIS W/CONTRAST: CPT

## 2019-09-18 PROCEDURE — 96375 TX/PRO/DX INJ NEW DRUG ADDON: CPT | Performed by: EMERGENCY MEDICINE

## 2019-09-18 PROCEDURE — 74011000258 HC RX REV CODE- 258: Performed by: EMERGENCY MEDICINE

## 2019-09-18 PROCEDURE — 96376 TX/PRO/DX INJ SAME DRUG ADON: CPT | Performed by: EMERGENCY MEDICINE

## 2019-09-18 PROCEDURE — 81003 URINALYSIS AUTO W/O SCOPE: CPT | Performed by: EMERGENCY MEDICINE

## 2019-09-18 PROCEDURE — 96374 THER/PROPH/DIAG INJ IV PUSH: CPT | Performed by: EMERGENCY MEDICINE

## 2019-09-18 RX ORDER — MORPHINE SULFATE 10 MG/ML
4 INJECTION, SOLUTION INTRAMUSCULAR; INTRAVENOUS ONCE
Status: COMPLETED | OUTPATIENT
Start: 2019-09-18 | End: 2019-09-18

## 2019-09-18 RX ORDER — SODIUM CHLORIDE 0.9 % (FLUSH) 0.9 %
10 SYRINGE (ML) INJECTION
Status: COMPLETED | OUTPATIENT
Start: 2019-09-18 | End: 2019-09-18

## 2019-09-18 RX ORDER — MORPHINE SULFATE 2 MG/ML
4 INJECTION, SOLUTION INTRAMUSCULAR; INTRAVENOUS ONCE
Status: COMPLETED | OUTPATIENT
Start: 2019-09-18 | End: 2019-09-18

## 2019-09-18 RX ORDER — ONDANSETRON 2 MG/ML
4 INJECTION INTRAMUSCULAR; INTRAVENOUS
Status: COMPLETED | OUTPATIENT
Start: 2019-09-18 | End: 2019-09-18

## 2019-09-18 RX ORDER — HYDROCODONE BITARTRATE AND ACETAMINOPHEN 5; 325 MG/1; MG/1
1 TABLET ORAL
Qty: 20 TAB | Refills: 0 | Status: SHIPPED | OUTPATIENT
Start: 2019-09-18 | End: 2019-09-22

## 2019-09-18 RX ADMIN — ONDANSETRON 4 MG: 2 INJECTION INTRAMUSCULAR; INTRAVENOUS at 10:03

## 2019-09-18 RX ADMIN — Medication 10 ML: at 09:12

## 2019-09-18 RX ADMIN — MORPHINE SULFATE 4 MG: 10 INJECTION, SOLUTION INTRAMUSCULAR; INTRAVENOUS at 11:35

## 2019-09-18 RX ADMIN — SODIUM CHLORIDE 100 ML: 900 INJECTION, SOLUTION INTRAVENOUS at 09:12

## 2019-09-18 RX ADMIN — IOPAMIDOL 100 ML: 755 INJECTION, SOLUTION INTRAVENOUS at 09:12

## 2019-09-18 RX ADMIN — DIATRIZOATE MEGLUMINE AND DIATRIZOATE SODIUM 15 ML: 600; 100 SOLUTION ORAL; RECTAL at 07:51

## 2019-09-18 RX ADMIN — MORPHINE SULFATE 4 MG: 2 INJECTION, SOLUTION INTRAMUSCULAR; INTRAVENOUS at 10:03

## 2019-09-18 NOTE — ED NOTES
I have reviewed discharge instructions with the patient and spouse. The patient and spouse verbalized understanding. Patient left ED via Discharge Method: wheelchair to Home with spouse  Opportunity for questions and clarification provided. Patient given 1 scripts. To continue your aftercare when you leave the hospital, you may receive an automated call from our care team to check in on how you are doing. This is a free service and part of our promise to provide the best care and service to meet your aftercare needs.  If you have questions, or wish to unsubscribe from this service please call [phone]. Thank you for Choosing our [facility] Emergency Department.

## 2019-09-18 NOTE — ED PROVIDER NOTES
Patient is a 19-year-old male with a history of hypertension and heart disease who comes to the emergency department today complaining of right-sided abdominal pain he states for the past month or so he has had intermittent pain in the right abdomen but it became much more severe yesterday. Also some pain in the right flank. The history is provided by the patient. Abdominal Pain    This is a new problem. The current episode started more than 1 week ago. The problem occurs daily. The problem has been rapidly worsening. The pain is associated with an unknown factor. The pain is located in the RLQ. The quality of the pain is sharp. The pain is moderate. Pertinent negatives include no fever, no diarrhea, no melena, no nausea, no vomiting, no constipation, no dysuria, no frequency, no trauma, no chest pain and no back pain. Nothing worsens the pain. The pain is relieved by nothing. Past workup includes no CT scan, no ultrasound. His past medical history does not include gallstones, Crohn's disease, pancreatitis, diverticulitis or kidney stones.         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 Stephenie [facility] 1966  2014    pt not sure if he had a stent, had CABG    HX HERNIA REPAIR Right     inguinal, ventral-after bypass    HX KNEE REPLACEMENT Left     HX LUMBAR FUSION      HX PACEMAKER      cardiac defib         Family History:   Problem Relation Age of Onset    Heart Disease Father        Social History     Socioeconomic History    Marital status:      Spouse name: Not on file    Number of children: Not on file    Years of education: Not on file    Highest education level: Not on file   Occupational History    Not on file   Social Needs    Financial resource strain: Not on file    Food insecurity:     Worry: Not on file     Inability: Not on file    Transportation needs:     Medical: Not on file     Non-medical: Not on file   Tobacco Use    Smoking status: Never Smoker    Smokeless tobacco: Never Used   Substance and Sexual Activity    Alcohol use: Yes     Alcohol/week: 1.7 standard drinks     Types: 2 Cans of beer per week    Drug use: No    Sexual activity: Not on file   Lifestyle    Physical activity:     Days per week: Not on file     Minutes per session: Not on file    Stress: Not on file   Relationships    Social connections:     Talks on phone: Not on file     Gets together: Not on file     Attends Worship service: Not on file     Active member of club or organization: Not on file     Attends meetings of clubs or organizations: Not on file     Relationship status: Not on file    Intimate partner violence:     Fear of current or ex partner: Not on file     Emotionally abused: Not on file     Physically abused: Not on file     Forced sexual activity: Not on file   Other Topics Concern    Not on file   Social History Narrative    Not on file         ALLERGIES: Equine protein and Sulfa (sulfonamide antibiotics)    Review of Systems   Constitutional: Negative for chills, fatigue and fever.    HENT: Negative for congestion, rhinorrhea and sore throat. Eyes: Negative for pain, discharge and visual disturbance. Respiratory: Negative for cough and shortness of breath. Cardiovascular: Negative for chest pain and palpitations. Gastrointestinal: Positive for abdominal pain. Negative for constipation, diarrhea, melena, nausea and vomiting. Endocrine: Negative for polydipsia and polyuria. Genitourinary: Negative for dysuria, frequency and urgency. Musculoskeletal: Negative for back pain and neck pain. Skin: Negative for rash. Neurological: Negative for seizures, syncope and weakness. Hematological: Negative. Vitals:    09/18/19 0739   BP: 162/79   Pulse: 64   Resp: 16   Temp: 98.1 °F (36.7 °C)   SpO2: 99%   Weight: 72.6 kg (160 lb)   Height: 5' 10\" (1.778 m)            Physical Exam   Constitutional: He is oriented to person, place, and time. He appears well-developed and well-nourished. HENT:   Head: Normocephalic and atraumatic. Eyes: Pupils are equal, round, and reactive to light. Conjunctivae and EOM are normal.   Neck: Normal range of motion. Neck supple. Cardiovascular: Normal rate, regular rhythm and intact distal pulses. Pulmonary/Chest: Effort normal and breath sounds normal.   Abdominal: Soft. There is tenderness in the right lower quadrant. There is no rebound and no guarding. Musculoskeletal: Normal range of motion. He exhibits no edema or tenderness. Lymphadenopathy:     He has no cervical adenopathy. Neurological: He is alert and oriented to person, place, and time. He has normal strength. No cranial nerve deficit or sensory deficit. GCS eye subscore is 4. GCS verbal subscore is 5. GCS motor subscore is 6. Skin: Skin is warm and dry. Capillary refill takes less than 2 seconds. No rash noted. Nursing note and vitals reviewed.        MDM  Number of Diagnoses or Management Options  Diagnosis management comments: Urinalysis is clean  We will check basic screening labs and a CAT scan of the abdomen and pelvis for further evaluation    10:00 AM  Blood Work is unremarkable. 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 We will discuss the CT findings with urology to determine disposition. 11:32 AM  Patient is in pain again additional morphine is ordered. Still have not heard back from urology. 12:12 PM  Discussed the case with Dr. Yogesh Contreras of urology. He has reviewed the CAT scan. He thinks this patient likely just has chronic cyst.  He states patient does not need admission or emergent surgery. He requests that we prescribe some pain medication and provide their office number for outpatient follow-up and further evaluation. Voice dictation software was used during the making of this note. This software is not perfect and grammatical and other typographical errors may be present. This note has been proofread, but may still contain errors.   Morenita Telles MD; 9/18/2019 @12:12 PM   ===================================================================         Amount and/or Complexity of Data Reviewed  Clinical lab tests: ordered and reviewed  Tests in the radiology section of CPT®: ordered and reviewed  Review and summarize past medical records: yes  Discuss the patient with other providers: yes  Independent visualization of images, tracings, or specimens: yes    Risk of Complications, Morbidity, and/or Mortality  Presenting problems: low  Diagnostic procedures: low  Management options: low    Patient Progress  Patient progress: improved         Procedures

## 2019-09-18 NOTE — ED TRIAGE NOTES
Patient with right sided abdominal pain off and on for a few months, Patient became severe with radiation into back and mid abdominal area. Patient denies any nausea, vomiting, diarrhea or changes in urination.

## 2019-09-18 NOTE — PROGRESS NOTES
Location of Assessment: ED RM 2    Socioevironmental:  SW met with patient who states that he lives with his wife Musa Blood ([phone]). Patient reports that he's independent at home and drives. Ambulation/ADLs:  Patient states that he does not use assistive devices to ambulate, is indp in his ADLs, and denies any falls. Primary Care:  Patient sees Dr. Bisi Bright for primary care, last appointment was in December. Patient states that he goes once a year unless he requires additional visits. Needs:   No needs are identified at this time. Case management will continue to follow until discharge to help accommodate any that should arise.      Chip Rosas-Lens    [facility] AT [facility] [email or web address]

## 2019-10-07 PROCEDURE — 74011250636 HC RX REV CODE- 250/636: Performed by: UROLOGY

## 2019-10-07 PROCEDURE — 78708 K FLOW/FUNCT IMAGE W/DRUG: CPT

## 2019-10-07 RX ORDER — FUROSEMIDE 10 MG/ML
40 INJECTION INTRAMUSCULAR; INTRAVENOUS ONCE
Status: DISPENSED | OUTPATIENT
Start: 2019-10-07 | End: 2019-10-07

## 2019-10-10 ENCOUNTER — HOSPITAL ENCOUNTER (OUTPATIENT)
Dept: NUCLEAR MEDICINE | Age: 74
Discharge: HOME | End: 2019-10-07
Attending: UROLOGY
Payer: MEDICARE

## 2019-10-10 DIAGNOSIS — N13.30 HYDRONEPHROSIS, UNSPECIFIED HYDRONEPHROSIS TYPE: ICD-10-CM

## 2019-10-10 PROCEDURE — 74011250636 HC RX REV CODE- 250/636: Performed by: UROLOGY

## 2019-10-10 PROCEDURE — 78708 K FLOW/FUNCT IMAGE W/DRUG: CPT

## 2019-10-10 RX ORDER — SODIUM CHLORIDE 0.9 % (FLUSH) 0.9 %
10 SYRINGE (ML) INJECTION
Status: DISCONTINUED | OUTPATIENT
Start: 2019-10-10 | End: 2019-10-10 | Stop reason: HOSPADM

## 2019-10-10 RX ORDER — FUROSEMIDE 10 MG/ML
40 INJECTION INTRAMUSCULAR; INTRAVENOUS ONCE
Status: COMPLETED | OUTPATIENT
Start: 2019-10-10 | End: 2019-10-10

## 2019-10-10 RX ADMIN — FUROSEMIDE 40 MG: 10 INJECTION, SOLUTION INTRAMUSCULAR; INTRAVENOUS at 09:30

## 2019-10-23 ENCOUNTER — ANESTHESIA EVENT (OUTPATIENT)
Dept: SURGERY | Age: 74
End: 2019-10-23
Payer: MEDICARE

## 2019-10-23 NOTE — ANESTHESIA PREPROCEDURE EVALUATION
Relevant Problems   No relevant active problems       Anesthetic History               Review of Systems / Medical History  Patient summary reviewed, nursing notes reviewed and pertinent labs reviewed    Pulmonary        Sleep apnea: BiPAP           Neuro/Psych             Comments: [place] [facility]    Hypertension          Pacemaker (ICD X 1 yr. Has never discharged) and CAD (5 yrs s/p CABG)    Exercise tolerance: <4 METS  Comments: Ischemic cardiomyopathy    TTE April 2019:  LVEF 30-35%.   Mild MR.  Mild-moderate TR.   GI/Hepatic/Renal     GERD: well controlled           Endo/Other      Hypothyroidism: well controlled  Arthritis (rheumatoid)     Other Findings            Physical Exam    Airway  Mallampati: II  TM Distance: > 6 cm  Neck ROM: decreased range of motion   Mouth opening: Normal     Cardiovascular  Regular rate and rhythm,  S1 and S2 normal,  no murmur, click, rub, or gallop             Dental    Dentition: Poor dentition     Pulmonary  Breath sounds clear to auscultation               Abdominal  GI exam deferred       Other Findings            Anesthetic Plan    ASA: 4  Anesthesia type: general            Anesthetic plan and risks discussed with: Patient and Spouse

## 2019-10-24 ENCOUNTER — HOSPITAL ENCOUNTER (OUTPATIENT)
Age: 74
Setting detail: OUTPATIENT SURGERY
Discharge: HOME | End: 2019-10-24
Attending: UROLOGY | Admitting: UROLOGY
Payer: MEDICARE

## 2019-10-24 ENCOUNTER — ANESTHESIA (OUTPATIENT)
Dept: SURGERY | Age: 74
End: 2019-10-24
Payer: MEDICARE

## 2019-10-24 VITALS
OXYGEN SATURATION: 97 % | WEIGHT: 161.8 LBS | RESPIRATION RATE: 17 BRPM | DIASTOLIC BLOOD PRESSURE: 88 MMHG | BODY MASS INDEX: 23.16 KG/M2 | SYSTOLIC BLOOD PRESSURE: 166 MMHG | TEMPERATURE: 97.8 F | HEART RATE: 62 BPM | HEIGHT: 70 IN

## 2019-10-24 LAB
HGB BLD-MCNC: 14.8 G/DL (ref 13.6–17.2)
POTASSIUM BLD-SCNC: 4 MMOL/L (ref 3.5–5.1)

## 2019-10-24 PROCEDURE — 77030037088 HC TUBE ENDOTRACH ORAL NSL COVD-A: Performed by: ANESTHESIOLOGY

## 2019-10-24 PROCEDURE — 77030018832 HC SOL IRR H20 ICUM -A: Performed by: UROLOGY

## 2019-10-24 PROCEDURE — 76010000149 HC OR TIME 1 TO 1.5 HR: Performed by: UROLOGY

## 2019-10-24 PROCEDURE — 77030019940 HC BLNKT HYPOTHRM STRY -B: Performed by: ANESTHESIOLOGY

## 2019-10-24 PROCEDURE — 74011250636 HC RX REV CODE- 250/636: Performed by: ANESTHESIOLOGY

## 2019-10-24 PROCEDURE — 74011250636 HC RX REV CODE- 250/636: Performed by: UROLOGY

## 2019-10-24 PROCEDURE — C1769 GUIDE WIRE: HCPCS | Performed by: UROLOGY

## 2019-10-24 PROCEDURE — 76210000006 HC OR PH I REC 0.5 TO 1 HR: Performed by: UROLOGY

## 2019-10-24 PROCEDURE — 84132 ASSAY OF SERUM POTASSIUM: CPT

## 2019-10-24 PROCEDURE — 77030040361 HC SLV COMPR DVT MDII -B: Performed by: UROLOGY

## 2019-10-24 PROCEDURE — 76060000033 HC ANESTHESIA 1 TO 1.5 HR: Performed by: UROLOGY

## 2019-10-24 PROCEDURE — C2617 STENT, NON-COR, TEM W/O DEL: HCPCS | Performed by: UROLOGY

## 2019-10-24 PROCEDURE — 77030039425 HC BLD LARYNG TRULITE DISP TELE -A: Performed by: ANESTHESIOLOGY

## 2019-10-24 PROCEDURE — 74011000250 HC RX REV CODE- 250: Performed by: UROLOGY

## 2019-10-24 PROCEDURE — 74011000250 HC RX REV CODE- 250: Performed by: NURSE ANESTHETIST, CERTIFIED REGISTERED

## 2019-10-24 PROCEDURE — 85018 HEMOGLOBIN: CPT

## 2019-10-24 PROCEDURE — 74011636320 HC RX REV CODE- 636/320: Performed by: UROLOGY

## 2019-10-24 PROCEDURE — 76210000021 HC REC RM PH II 0.5 TO 1 HR: Performed by: UROLOGY

## 2019-10-24 PROCEDURE — 77030019908 HC STETH ESOPH SIMS -A: Performed by: ANESTHESIOLOGY

## 2019-10-24 PROCEDURE — C1758 CATHETER, URETERAL: HCPCS | Performed by: UROLOGY

## 2019-10-24 PROCEDURE — 74011250636 HC RX REV CODE- 250/636

## 2019-10-24 PROCEDURE — 74011250636 HC RX REV CODE- 250/636: Performed by: NURSE ANESTHETIST, CERTIFIED REGISTERED

## 2019-10-24 DEVICE — URETERAL STENT
Type: IMPLANTABLE DEVICE | Site: URETER | Status: FUNCTIONAL
Brand: PERCUFLEX™ PLUS

## 2019-10-24 RX ORDER — ONDANSETRON 2 MG/ML
INJECTION INTRAMUSCULAR; INTRAVENOUS AS NEEDED
Status: DISCONTINUED | OUTPATIENT
Start: 2019-10-24 | End: 2019-10-24 | Stop reason: HOSPADM

## 2019-10-24 RX ORDER — LIDOCAINE HYDROCHLORIDE 10 MG/ML
0.1 INJECTION INFILTRATION; PERINEURAL AS NEEDED
Status: DISCONTINUED | OUTPATIENT
Start: 2019-10-24 | End: 2019-10-24 | Stop reason: HOSPADM

## 2019-10-24 RX ORDER — BUPIVACAINE HYDROCHLORIDE 5 MG/ML
INJECTION, SOLUTION EPIDURAL; INTRACAUDAL AS NEEDED
Status: DISCONTINUED | OUTPATIENT
Start: 2019-10-24 | End: 2019-10-24 | Stop reason: HOSPADM

## 2019-10-24 RX ORDER — SODIUM CHLORIDE, SODIUM LACTATE, POTASSIUM CHLORIDE, CALCIUM CHLORIDE 600; 310; 30; 20 MG/100ML; MG/100ML; MG/100ML; MG/100ML
100 INJECTION, SOLUTION INTRAVENOUS CONTINUOUS
Status: DISCONTINUED | OUTPATIENT
Start: 2019-10-24 | End: 2019-10-24 | Stop reason: HOSPADM

## 2019-10-24 RX ORDER — OXYCODONE HYDROCHLORIDE 5 MG/1
5 TABLET ORAL
Status: DISCONTINUED | OUTPATIENT
Start: 2019-10-24 | End: 2019-10-24 | Stop reason: HOSPADM

## 2019-10-24 RX ORDER — ROCURONIUM BROMIDE 10 MG/ML
INJECTION, SOLUTION INTRAVENOUS AS NEEDED
Status: DISCONTINUED | OUTPATIENT
Start: 2019-10-24 | End: 2019-10-24 | Stop reason: HOSPADM

## 2019-10-24 RX ORDER — NEOSTIGMINE METHYLSULFATE 1 MG/ML
INJECTION INTRAVENOUS AS NEEDED
Status: DISCONTINUED | OUTPATIENT
Start: 2019-10-24 | End: 2019-10-24 | Stop reason: HOSPADM

## 2019-10-24 RX ORDER — NALOXONE HYDROCHLORIDE 0.4 MG/ML
0.1 INJECTION, SOLUTION INTRAMUSCULAR; INTRAVENOUS; SUBCUTANEOUS AS NEEDED
Status: DISCONTINUED | OUTPATIENT
Start: 2019-10-24 | End: 2019-10-24 | Stop reason: HOSPADM

## 2019-10-24 RX ORDER — CEFAZOLIN SODIUM/WATER 2 G/20 ML
2 SYRINGE (ML) INTRAVENOUS
Status: COMPLETED | OUTPATIENT
Start: 2019-10-24 | End: 2019-10-24

## 2019-10-24 RX ORDER — LIDOCAINE HYDROCHLORIDE 20 MG/ML
INJECTION, SOLUTION EPIDURAL; INFILTRATION; INTRACAUDAL; PERINEURAL AS NEEDED
Status: DISCONTINUED | OUTPATIENT
Start: 2019-10-24 | End: 2019-10-24 | Stop reason: HOSPADM

## 2019-10-24 RX ORDER — FENTANYL CITRATE 50 UG/ML
INJECTION, SOLUTION INTRAMUSCULAR; INTRAVENOUS AS NEEDED
Status: DISCONTINUED | OUTPATIENT
Start: 2019-10-24 | End: 2019-10-24 | Stop reason: HOSPADM

## 2019-10-24 RX ORDER — HYDROMORPHONE HYDROCHLORIDE 2 MG/ML
0.5 INJECTION, SOLUTION INTRAMUSCULAR; INTRAVENOUS; SUBCUTANEOUS
Status: DISCONTINUED | OUTPATIENT
Start: 2019-10-24 | End: 2019-10-24 | Stop reason: HOSPADM

## 2019-10-24 RX ORDER — ETOMIDATE 2 MG/ML
INJECTION INTRAVENOUS AS NEEDED
Status: DISCONTINUED | OUTPATIENT
Start: 2019-10-24 | End: 2019-10-24 | Stop reason: HOSPADM

## 2019-10-24 RX ORDER — GLYCOPYRROLATE 0.2 MG/ML
INJECTION INTRAMUSCULAR; INTRAVENOUS AS NEEDED
Status: DISCONTINUED | OUTPATIENT
Start: 2019-10-24 | End: 2019-10-24 | Stop reason: HOSPADM

## 2019-10-24 RX ORDER — PHENAZOPYRIDINE HYDROCHLORIDE 200 MG/1
200 TABLET, FILM COATED ORAL
Qty: 30 TAB | Refills: 3 | Status: SHIPPED | OUTPATIENT
Start: 2019-10-24 | End: 2019-10-27

## 2019-10-24 RX ORDER — DEXAMETHASONE SODIUM PHOSPHATE 4 MG/ML
INJECTION, SOLUTION INTRA-ARTICULAR; INTRALESIONAL; INTRAMUSCULAR; INTRAVENOUS; SOFT TISSUE AS NEEDED
Status: DISCONTINUED | OUTPATIENT
Start: 2019-10-24 | End: 2019-10-24 | Stop reason: HOSPADM

## 2019-10-24 RX ADMIN — FENTANYL CITRATE 25 MCG: 50 INJECTION INTRAMUSCULAR; INTRAVENOUS at 10:30

## 2019-10-24 RX ADMIN — PHENYLEPHRINE HYDROCHLORIDE 100 MCG: 10 INJECTION INTRAVENOUS at 10:58

## 2019-10-24 RX ADMIN — ROCURONIUM BROMIDE 40 MG: 10 INJECTION, SOLUTION INTRAVENOUS at 10:20

## 2019-10-24 RX ADMIN — ETOMIDATE 12 MG: 2 INJECTION, SOLUTION INTRAVENOUS at 10:19

## 2019-10-24 RX ADMIN — LIDOCAINE HYDROCHLORIDE 80 MG: 20 INJECTION, SOLUTION EPIDURAL; INFILTRATION; INTRACAUDAL; PERINEURAL at 10:19

## 2019-10-24 RX ADMIN — ONDANSETRON 4 MG: 2 INJECTION INTRAMUSCULAR; INTRAVENOUS at 11:07

## 2019-10-24 RX ADMIN — PHENYLEPHRINE HYDROCHLORIDE 100 MCG: 10 INJECTION INTRAVENOUS at 10:49

## 2019-10-24 RX ADMIN — FENTANYL CITRATE 75 MCG: 50 INJECTION INTRAMUSCULAR; INTRAVENOUS at 10:19

## 2019-10-24 RX ADMIN — GLYCOPYRROLATE 0.4 MG: 0.2 INJECTION, SOLUTION INTRAMUSCULAR; INTRAVENOUS at 11:07

## 2019-10-24 RX ADMIN — Medication 2 G: at 10:26

## 2019-10-24 RX ADMIN — SODIUM CHLORIDE, SODIUM LACTATE, POTASSIUM CHLORIDE, AND CALCIUM CHLORIDE 100 ML/HR: 600; 310; 30; 20 INJECTION, SOLUTION INTRAVENOUS at 07:47

## 2019-10-24 RX ADMIN — PHENYLEPHRINE HYDROCHLORIDE 100 MCG: 10 INJECTION INTRAVENOUS at 10:37

## 2019-10-24 RX ADMIN — DEXAMETHASONE SODIUM PHOSPHATE 4 MG: 4 INJECTION, SOLUTION INTRAMUSCULAR; INTRAVENOUS at 10:37

## 2019-10-24 RX ADMIN — NEOSTIGMINE METHYLSULFATE 3 MG: 1 INJECTION INTRAVENOUS at 11:07

## 2019-10-24 NOTE — ANESTHESIA POSTPROCEDURE EVALUATION
Procedure(s):  CYSTOSCOPY WITH BILATERAL RETROGRADE PYELOGRAMS  CYSTOSCOPY BILATERAL URETERAL STENT INSERTION PT HAS AN ICD. general    Anesthesia Post Evaluation        Patient location during evaluation: PACU  Patient participation: complete - patient participated  Level of consciousness: responsive to verbal stimuli  Pain management: adequate  Airway patency: patent  Anesthetic complications: no  Cardiovascular status: acceptable  Respiratory status: acceptable  Hydration status: euvolemic        Vitals Value Taken Time   /88 10/24/2019 12:14 PM   Temp 36.6 °C (97.8 °F) 10/24/2019 12:10 PM   Pulse 55 10/24/2019 12:13 PM   Resp 16 10/24/2019 12:10 PM   SpO2 97 % 10/24/2019 12:13 PM   Vitals shown include unvalidated device data.

## 2019-10-24 NOTE — BRIEF OP NOTE
BRIEF OPERATIVE NOTE    Date of Procedure: 10/24/2019   Preoperative Diagnosis: Hydronephrosis, unspecified hydronephrosis type [N13.30]  Postoperative Diagnosis: Hydronephrosis, unspecified hydronephrosis type [N13.30]    Procedure(s):  CYSTOSCOPY WITH BILATERAL RETROGRADE PYELOGRAMS  CYSTOSCOPY BILATERAL URETERAL STENT INSERTION PT HAS AN ICD  Surgeon(s) and Role:     * Festus Gutierrez MD - Primary         Surgical Assistant: none    Surgical Staff:  Circ-1: Trae Velez  Circ-2: Apple Campos RN  Event Time In Time Out   Incision Start 1032    Incision Close 1105      Anesthesia: General   Estimated Blood Loss: none  Specimens: * No specimens in log *   Findings: see op note   Complications: none  Implants:   Implant Name Type Inv.  Item Serial No.  Lot No. LRB No. Used Action   STENT URET 7F 26CM -- [address] [id number] - [id number]  Verlene Gabriela 7F 26CM -- [address] [place] [address]  [place] [employer] [id number] Left 1 Implanted   STENT URET 7F 26CM -- [address] [id number] - [id number]  STENT URET 7F 26CM -- [address] [address]  [employer] [employer] [id number] Right 1 Implanted

## 2019-10-24 NOTE — H&P
Bashir Marie  : 1945         Chief Complaint   Patient presents with    Hydronephrosis         HPI      Bashir Marie is a 75 y.o. male Referred by ER., pt went to ER on 19 wth c/o right abdominal and right flank pain. Had CT A/P w contrast: IMPRESSION:    1) There is perinephric fluid on the right with stranding densities in the  retroperitoneum. Etiology is not clear. 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  2) Additional findings include trace left pleural effusion, evidence of  sternotomy and cardiac pacemaker, moderate size hiatal hernia, moderate  thoracolumbar scoliosis, thickening of the urinary bladder wall and sigmoid  diverticulosis without definite acute inflammatory changes, bilateral L5  spondylolysis and grade 1-2 spondylolisthesis. Richard Mazariegos He states that I did what sounds like a TURP in  (records not available to me). Hx of CAD, s/p CABG, s/p defibrillator. He states that the pain was located in the RLQ. It went away the same day, but has had it before . no pain now, no hematuria. Creatinine 1.11 on 19.    He also occasionally has left flank pain.           Past Medical History:   Diagnosis Date    Aspirin long-term use       pt states he does not take plavix and hasn't for approximately a year    CAD (coronary artery disease) 2014     cabg x 4 vessels    Decreased cardiac ejection fraction       48% per stress test 16    GERD (gastroesophageal reflux disease)      Heart failure ([address]      [place] (hard of hearing)       hearing aides    Hypertension      LBBB (left bundle branch block) 2016     new     MI, old     RA (rheumatoid arthritis) ([address]      S/P CABG x 4 2014    Sleep apnea       bi pap    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 [facility] [address] [facility] [address]   2014     pt not sure if he had a stent, had CABG    HX HERNIA REPAIR Right       inguinal, ventral-after bypass    HX KNEE REPLACEMENT Left      HX LUMBAR FUSION        HX PACEMAKER         cardiac defib             Current Outpatient Medications   Medication Sig Dispense Refill    spironolactone (ALDACTONE) 25 mg tablet 1/2 tablet daily 45 Tab 3    metoprolol tartrate (LOPRESSOR) 25 mg tablet Take 1 Tab by mouth two (2) times a day. 180 Tab 3    losartan (COZAAR) 25 mg tablet Take 1 Tab by mouth daily. 90 Tab 3    nitroglycerin (NITROSTAT) 0.4 mg SL tablet 1 Tab by SubLINGual route every five (5) minutes as needed for Chest Pain. Indications: bring on the dos. 1 Bottle 11    atorvastatin (LIPITOR) 40 mg tablet Take 1 Tab by mouth daily. 90 Tab 3    leflunomide (ARAVA) 20 mg tablet Take 20 mg by mouth every morning. Indications: RHEUMATOID ARTHRITIS        aspirin delayed-release 81 mg tablet Take 81 mg by mouth every morning. Indications: continue                Allergies   Allergen Reactions    Equine Protein Unknown (comments)       \"horse serum\" \"found in td\"    Sulfa (Sulfonamide Antibiotics) Swelling      Social History            Socioeconomic History    Marital status:        Spouse name: Not on file    Number of children: Not on file    Years of education: Not on file    Highest education level: Not on file   Occupational History    Not on file   Social Needs    Financial resource strain: Not on file    Food insecurity:       Worry: Not on file       Inability: Not on file    Transportation needs:       Medical: Not on file       Non-medical: Not on file   Tobacco Use    Smoking status: Never Smoker    Smokeless tobacco: Never Used   Substance and Sexual Activity    Alcohol use:  Yes       Alcohol/week: 1.7 standard drinks       Types: 2 Cans of beer per week    Drug use: No    Sexual activity: Not on file   Lifestyle    Physical activity:       Days per week: Not on file       Minutes per session: Not on file    Stress: Not on file   Relationships    Social connections:       Talks on phone: Not on file       Gets together: Not on file       Attends [employer] service: Not on file       Active member of club or organization: Not on file       Attends meetings of clubs or organizations: Not on file       Relationship status: Not on file    Intimate partner violence:       Fear of current or ex partner: Not on file       Emotionally abused: Not on file       Physically abused: Not on file       Forced sexual activity: Not on file   Other Topics Concern    Not on file   Social History Narrative    Not on file            Family History   Problem Relation Age of Onset    Heart Disease Father           Review of Systems  Skin: Negative skin ROS  Eyes: Eyes negative  ENT: Positive for high frequency hearing loss. Respiratory: Positive for shortness of breath. Cardiovascular: Positive for chest pain, irregular heartbeat, leg pain and leg swelling. GI: Positive for abdominal pain. Genitourinary: Positive for nocturia, slower stream and erectile dysfunction. Musculoskeletal: Positive for back pain, arthralgias, muscle weakness and neck pain. Neurological: Positive for dizziness. Psychological: Neg psych ROS  Endocrine: Positive for fatigue. Hem/Lymphatic: Hematologic/lymphatic negative        Visit Vitals  /89   Pulse 72   Temp 98.5 °F (36.9 °C) (Oral)   Ht 5' 10\" (1.778 m)   Wt 165 lb (74.8 kg)   BMI 23.68 kg/m²      Physical Exam  General   Mental Status - Patient is alert and oriented X3. General Appearance - Not in acute distress. Build & Nutrition - Well nourished and Well developed.  Gait - Normal.        Eye   Pupil - Bilateral - Normal, Equal and Round.        Chest and Lung Exam   Auscultation:   Lung Sounds: - Normal, clear to auscultation.        Cardiovascular   Cardiovascular examination reveals  - normal heart sounds, regular rate and rhythm with no murmurs. left chest pacemaker/defbrillator.         Abdomen   Palpation/Percussion: Palpation and Percussion of the abdomen reveal - No Rebound tenderness, No Rigidity (guarding), No hepatosplenomegaly and No Palpable abdominal masses. Auscultation: Auscultation of the abdomen reveals - Bowel sounds normal.        Male Genitourinary   Evaluation of genitourinary system reveals - scrotum non-tender, no masses, normal testes, no palpable masses, normal epididymides, urethral meatus normal, no discharge, normal penis and normal seminal vesicles.        Rectal   Anorectal Exam: Prostate - 1+, no nodules. TURP defect .     Peripheral Vascular   Lower Extremity: Inspection - Bilateral - Inspection Normal.        Neurologic   Neurologic evaluation reveals  - upper and lower extremity deep tendon reflexes intact bilaterally . Cranial Nerves: - Cranial nerves are grossly intact.        Neuropsychiatric   The patient's mood and affect are described as  - normal.        Musculoskeletal  Global Assessment   Examination of related systems reveals - no generalized swelling or edema of extremities.        Lymphatic  General Lymphatics   Description - No Generalized lymphadenopathy. Tenderness - Non Tender.             Physical Exam     Assessment and Plan      ICD-10-CM ICD-9-CM     1.  Hydronephrosis, unspecified hydronephrosis type N13.30 591 AMB POC URINALYSIS DIP STICK AUTO W/ MICRO (PGU)         NM RENAL SCAN FLOW/FUNC W PHARM SNGL               Orders Placed This Encounter    03644 NM RENAL SCAN FLOW/FUNC W PHARM SNGL       Standing Status:   Future       Standing Expiration Date:   10/25/2020       Order Specific Question:   Which medication is being requested for this procedure?       Answer:   Per Protocol    AMB POC URINALYSIS DIP STICK AUTO W/ MICRO (PGU)      Looks like bilateral UPJ obstruction, L> R. Right sided pain and perinephric edema may have been due to ruptured fornix or possibly a passed stone. Does have chronic mild bilateral pain. Will get Lasix renogram.   Addis Lewis, [facility]           5:49 PM   Note      I notified Mr. Santos Knows of renal scan results--no obstruction and to make appt in 1 year with a renal scan prior. He said something has to be done. He cannot take the painful attacks in his flank. Please call him to discuss [phone].                 5:53 PM   Fariba Carter routed this conversation to Me    October 21, 2019   Me            2:02 PM   Note      Called pt, he is still having intermittent flank pain, sometimes right and sometimes left. He can't take the pain. May have pain from bilat UPJ, may have crossing vessel when I review the CT. Or pain may be from his severe scoliosis. Renal scan showed no obstruction, but had fluid around right kidney at time of right sided pain, may have been from ruptured fornix . Plan cysto bilateral stent placement w retrogrades, to see if this will help his pain. He is aware that he may need referral for back pan. plan cysto, bilateral stents, retrogrades .

## 2019-10-24 NOTE — DISCHARGE INSTRUCTIONS
Ureteral Stent Placement: What to Expect at [address] [place] [address]  A ureteral (say \"you-REE-ter-ul\") stent is a thin, hollow tube that is placed in the ureter to help urine pass from the kidney into the bladder. Ureters are the tubes that connect the kidneys to the bladder. You may have a small amount of blood in your urine for 1 to 3 days after the procedure. While the stent is in place, you may have to urinate more often, feel a sudden need to urinate, or feel like you cannot completely empty your bladder. You may feel some pain when you urinate or do strenuous activity. You also may notice a small amount of blood in your urine after strenuous activities. These side effects usually do not prevent people from doing their normal daily activities. You may have a string attached to your stent. Do not to pull the string unless the doctor tells you to. The doctor will use the string to pull out the stent when you no longer need it. After the procedure, urine may flow better from your kidneys to your bladder. A ureteral stent may be left in place for several days or for as long as several months. Your doctor will take it out when you no longer need it. Cystoscopy: What to Expect at [address]  A cystoscopy is a procedure that lets a doctor look inside of the bladder and the urethra. The urethra is the tube that carries urine from the bladder to outside the body. The doctor uses a thin, lighted tube called a cystoscope to look for kidney or bladder stones, tumors, bleeding, or infection. After the cystoscopy, your urethra may be sore at first, and it may burn when you urinate for the first few days after the procedure. You may feel the need to urinate more often, and your urine may be pink. These symptoms should get better in 1 or 2 days. You will probably be able to go back to work or most of your usual activities in 1 or 2 days. How can you care for yourself at home?   Activity  · Rest when you feel tired. Getting enough sleep will help you recover. · Try to walk each day. Start by walking a little more than you did the day before. Bit by bit, increase the amount you walk. Walking boosts blood flow and helps prevent pneumonia and constipation. · Avoid strenuous activities, such as bicycle riding, jogging, weight lifting, or aerobic exercise, until your doctor says it is okay. · Ask your doctor when you can drive again. · Most people are able to return to work within 1 or 2 days after the procedure. · You may shower and take baths as usual.  · Ask your doctor when it is okay for you to have sex. Diet  · You can eat your normal diet. If your stomach is upset, try bland, low-fat foods like plain rice, broiled chicken, toast, and yogurt. · Drink plenty of fluids (unless your doctor tells you not to). Medicines  · Take pain medicines exactly as directed. ¨ If the doctor gave you a prescription medicine for pain, take it as prescribed. ¨ If you are not taking a prescription pain medicine, ask your doctor if you can take an over-the-counter medicine. · If you think your pain medicine is making you sick to your stomach:  ¨ Take your medicine after meals (unless your doctor has told you not to). ¨ Ask your doctor for a different pain medicine. · If your doctor prescribed antibiotics, take them as directed. Do not stop taking them just because you feel better. You need to take the full course of antibiotics. Follow-up care is a key part of your treatment and safety. Be sure to make and go to all appointments, and call your doctor if you are having problems. It's also a good idea to know your test results and keep a list of the medicines you take. When should you call for help? Call 911 anytime you think you may need emergency care. For example, call if:  · You passed out (lost consciousness). · You have severe trouble breathing.   · You have sudden chest pain and shortness of breath, or you cough up blood.  · You have severe belly pain. Call your doctor now or seek immediate medical care if:  · You are sick to your stomach or cannot keep fluids down. · Your urine is still red or you see blood clots after you have urinated several times. · You have trouble passing urine or stool, especially if you have pain or swelling in your lower belly. · You have signs of a blood clot, such as:  ¨ Pain in your calf, back of the knee, thigh, or groin. ¨ Redness and swelling in your leg or groin. · You develop a fever or severe chills. · You have pain in your back just below your rib cage. This is called flank pain. Watch closely for changes in your health, and be sure to contact your doctor if:  · A burning feeling is normal for a day or two after the test, but call if it does not get better. · You have a frequent urge to urinate but can pass only small amounts of urine. · It is normal for the urine to have a pinkish color for a few days after the test, but call if it does not get better or if your urine is cloudy, smells bad, or has pus in it. After general anesthesia or intravenous sedation, for 24 hours or while taking prescription Narcotics:  · Limit your activities  · A responsible adult needs to be with you for the next 24 hours  · Do not drive and operate hazardous machinery  · Do not make important personal or business decisions  · Do  not drink alcoholic beverages  · If you have not urinated within 8 hours after discharge, please contact your surgeon on call. · If you have sleep apnea and have a CPAP machine, please use it for all naps and sleeping. · Please use caution when taking narcotics and any of your home medications that may cause drowsiness. *  Please give a list of your current medications to your Primary Care Provider. *  Please update this list whenever your medications are discontinued, doses are      changed, or new medications (including over-the-counter products) are added.   * Please carry medication information at all times in case of emergency situations. These are general instructions for a healthy lifestyle:  No smoking/ No tobacco products/ Avoid exposure to second hand smoke  Surgeon General's Warning:  Quitting smoking now greatly reduces serious risk to your health. Obesity, smoking, and sedentary lifestyle greatly increases your risk for illness  A healthy diet, regular physical exercise & weight monitoring are important for maintaining a healthy lifestyle    You may be retaining fluid if you have a history of heart failure or if you experience any of the following symptoms:  Weight gain of 3 pounds or more overnight or 5 pounds in a week, increased swelling in our hands or feet or shortness of breath while lying flat in bed. Please call your doctor as soon as you notice any of these symptoms; do not wait until your next office visit.

## 2019-10-24 NOTE — OP NOTES
[facility]  OPERATIVE REPORT    Name:  Mary Fried  MR#:  [id number]  :  1945  ACCOUNT #:  [de-identified]  DATE OF SERVICE:  10/24/2019    PREOPERATIVE DIAGNOSES:  1.  Bilateral flank pain. 2.  Bilateral ureteropelvic junction obstruction. 3.  Scoliosis. POSTOPERATIVE DIAGNOSES:  1.  Bilateral flank pain. 2.  Bilateral ureteropelvic junction obstruction. 3.  Scoliosis. PROCEDURE PERFORMED:  Cystoscopy, bilateral retrograde pyelograms, placement of bilateral double-J ureteral stents. SURGEON:  Vianca Bey. Virginia Duval MD    ASSISTANT:  None. ANESTHESIA:  General.    COMPLICATIONS:  None. SPECIMENS REMOVED:  None. IMPLANTS:  Bilateral ureteral stents. ESTIMATED BLOOD LOSS:  None. FINDINGS:  1. Prostate is status post TURP with residual obstructing tissue. 2.  Bilateral UPJ obstruction with bilateral hydronephrosis, left greater than right, severe scoliosis. DESCRIPTION OF PROCEDURE:  The patient was given a general anesthetic and placed in the dorsal lithotomy position. He was prepped and draped in sterile fashion. A 22-Turkmen cystoscope was advanced into the urethra using video camera and 30-degree lens. The anterior urethra was normal.  Prostate shows evidence of previous TURP, but there is residual adenomatous tissue causing obstruction, especially on the right side. Scope was passed into the bladder. There were no stones or tumors. A cone-tipped catheter was used to perform initially a right retrograde pyelogram.    INTERPRETATION OF PYELOGRAM:  Contrast was injected to opacify the right ureter and renal collecting system. There is an area of narrowing of the UPJ on the right with moderately dilated renal pelvis. Contrast was then injected into the left side again to perform retrograde. The left side shows a much more dilated renal pelvis with redundant renal pelvis. There is an area of narrowing consistent with UPJ obstruction.   The calyces on both sides do not appear to be especially dilated. At this point, a guidewire was passed up the left ureter using fluoroscopic guidance. Using an open-ended ureteral catheter, I was able to pass the wire into the renal pelvis. It was seen to clearly curl around within the dilated renal pelvis. A 7-Marshallese 26-cm long double-J stent was passed on the left side and left in good position. There was a hydronephrotic drip from the stent noted in the bladder. I left suture attached to the left stent and this was left in the urethra. The procedure was repeated on the right side again with the wire going up into the renal pelvis with fluoroscopic guidance. A 7-Marshallese 26-cm stent placed on the right side with suture attached to the stent and exiting the urethral meatus. We instilled Marcaine in the bladder and removed the scope. PLAN:  He is to return to the office in 4-6 weeks. We will assess his pain pattern to see if the placement of the stents have helped his pain.       Angel Contreras MD      [id number]  D:  10/24/2019 11:35  T:  10/24/2019 13:20  JOB #:  [id number]

## 2021-05-26 PROBLEM — G47.00 FREQUENT NOCTURNAL AWAKENING: Status: ACTIVE | Noted: 2021-05-26

## 2021-05-26 PROBLEM — G47.31 CENTRAL SLEEP APNEA: Status: ACTIVE | Noted: 2021-05-26

## 2021-05-26 PROBLEM — G47.33 OSA (OBSTRUCTIVE SLEEP APNEA): Status: ACTIVE | Noted: 2021-05-26

## 2021-05-28 ENCOUNTER — HOSPITAL ENCOUNTER (OUTPATIENT)
Dept: LAB | Age: 76
Discharge: HOME | End: 2021-05-28
Payer: MEDICARE

## 2021-05-28 DIAGNOSIS — I10 ESSENTIAL HYPERTENSION: ICD-10-CM

## 2021-05-28 DIAGNOSIS — I50.22 CHRONIC SYSTOLIC HEART FAILURE (HCC): ICD-10-CM

## 2021-05-28 LAB
ANION GAP SERPL CALC-SCNC: 2 MMOL/L (ref 7–16)
BUN SERPL-MCNC: 25 MG/DL (ref 8–23)
CALCIUM SERPL-MCNC: 8.8 MG/DL (ref 8.3–10.4)
CHLORIDE SERPL-SCNC: 110 MMOL/L (ref 98–107)
CO2 SERPL-SCNC: 27 MMOL/L (ref 21–32)
CREAT SERPL-MCNC: 1.24 MG/DL (ref 0.8–1.5)
GLUCOSE SERPL-MCNC: 86 MG/DL (ref 65–100)
POTASSIUM SERPL-SCNC: 5 MMOL/L (ref 3.5–5.1)
SODIUM SERPL-SCNC: 139 MMOL/L (ref 138–145)

## 2021-05-28 PROCEDURE — 36415 COLL VENOUS BLD VENIPUNCTURE: CPT

## 2021-05-28 PROCEDURE — 80048 BASIC METABOLIC PNL TOTAL CA: CPT

## 2022-02-24 NOTE — PROGRESS NOTES
covid test neg  Arrival time and location confirmed  Will have someone with him to stay during the procedure and drive him home.

## 2022-02-25 ENCOUNTER — ANESTHESIA (OUTPATIENT)
Dept: ENDOSCOPY | Age: 77
End: 2022-02-25
Payer: MEDICARE

## 2022-02-25 ENCOUNTER — ANESTHESIA EVENT (OUTPATIENT)
Dept: ENDOSCOPY | Age: 77
End: 2022-02-25
Payer: MEDICARE

## 2022-02-25 ENCOUNTER — HOSPITAL ENCOUNTER (OUTPATIENT)
Age: 77
Setting detail: OUTPATIENT SURGERY
Discharge: HOME | End: 2022-02-25
Attending: INTERNAL MEDICINE | Admitting: INTERNAL MEDICINE
Payer: MEDICARE

## 2022-02-25 VITALS
RESPIRATION RATE: 15 BRPM | OXYGEN SATURATION: 99 % | HEART RATE: 75 BPM | TEMPERATURE: 98.6 F | SYSTOLIC BLOOD PRESSURE: 129 MMHG | DIASTOLIC BLOOD PRESSURE: 69 MMHG | WEIGHT: 150 LBS | HEIGHT: 70 IN | BODY MASS INDEX: 21.47 KG/M2

## 2022-02-25 PROCEDURE — 77030021593 HC FCPS BIOP ENDOSC BSC -A: Performed by: INTERNAL MEDICINE

## 2022-02-25 PROCEDURE — 76060000032 HC ANESTHESIA 0.5 TO 1 HR: Performed by: INTERNAL MEDICINE

## 2022-02-25 PROCEDURE — 74011000250 HC RX REV CODE- 250: Performed by: REGISTERED NURSE

## 2022-02-25 PROCEDURE — 87081 CULTURE SCREEN ONLY: CPT

## 2022-02-25 PROCEDURE — 88305 TISSUE EXAM BY PATHOLOGIST: CPT

## 2022-02-25 PROCEDURE — 74011250636 HC RX REV CODE- 250/636: Performed by: ANESTHESIOLOGY

## 2022-02-25 PROCEDURE — 77030013991 HC SNR POLYP ENDOSC BSC -A: Performed by: INTERNAL MEDICINE

## 2022-02-25 PROCEDURE — 76040000026: Performed by: INTERNAL MEDICINE

## 2022-02-25 PROCEDURE — 2709999900 HC NON-CHARGEABLE SUPPLY: Performed by: INTERNAL MEDICINE

## 2022-02-25 PROCEDURE — 74011250636 HC RX REV CODE- 250/636: Performed by: REGISTERED NURSE

## 2022-02-25 RX ORDER — PROPOFOL 10 MG/ML
INJECTION, EMULSION INTRAVENOUS AS NEEDED
Status: DISCONTINUED | OUTPATIENT
Start: 2022-02-25 | End: 2022-02-25 | Stop reason: HOSPADM

## 2022-02-25 RX ORDER — PROPOFOL 10 MG/ML
INJECTION, EMULSION INTRAVENOUS
Status: DISCONTINUED | OUTPATIENT
Start: 2022-02-25 | End: 2022-02-25 | Stop reason: HOSPADM

## 2022-02-25 RX ORDER — EPHEDRINE SULFATE/0.9% NACL/PF 50 MG/5 ML
SYRINGE (ML) INTRAVENOUS AS NEEDED
Status: DISCONTINUED | OUTPATIENT
Start: 2022-02-25 | End: 2022-02-25 | Stop reason: HOSPADM

## 2022-02-25 RX ORDER — LIDOCAINE HYDROCHLORIDE 20 MG/ML
INJECTION, SOLUTION EPIDURAL; INFILTRATION; INTRACAUDAL; PERINEURAL AS NEEDED
Status: DISCONTINUED | OUTPATIENT
Start: 2022-02-25 | End: 2022-02-25 | Stop reason: HOSPADM

## 2022-02-25 RX ORDER — SODIUM CHLORIDE, SODIUM LACTATE, POTASSIUM CHLORIDE, CALCIUM CHLORIDE 600; 310; 30; 20 MG/100ML; MG/100ML; MG/100ML; MG/100ML
100 INJECTION, SOLUTION INTRAVENOUS CONTINUOUS
Status: DISCONTINUED | OUTPATIENT
Start: 2022-02-25 | End: 2022-02-25 | Stop reason: HOSPADM

## 2022-02-25 RX ORDER — SODIUM CHLORIDE 0.9 % (FLUSH) 0.9 %
5-40 SYRINGE (ML) INJECTION AS NEEDED
Status: DISCONTINUED | OUTPATIENT
Start: 2022-02-25 | End: 2022-02-25 | Stop reason: HOSPADM

## 2022-02-25 RX ORDER — SODIUM CHLORIDE 0.9 % (FLUSH) 0.9 %
5-40 SYRINGE (ML) INJECTION EVERY 8 HOURS
Status: DISCONTINUED | OUTPATIENT
Start: 2022-02-25 | End: 2022-02-25 | Stop reason: HOSPADM

## 2022-02-25 RX ADMIN — PROPOFOL 160 MCG/KG/MIN: 10 INJECTION, EMULSION INTRAVENOUS at 12:12

## 2022-02-25 RX ADMIN — LIDOCAINE HYDROCHLORIDE 50 MG: 20 INJECTION, SOLUTION EPIDURAL; INFILTRATION; INTRACAUDAL; PERINEURAL at 12:09

## 2022-02-25 RX ADMIN — PROPOFOL 30 MG: 10 INJECTION, EMULSION INTRAVENOUS at 12:12

## 2022-02-25 RX ADMIN — Medication 10 MG: at 12:31

## 2022-02-25 RX ADMIN — Medication 10 MG: at 12:49

## 2022-02-25 RX ADMIN — PROPOFOL 30 MG: 10 INJECTION, EMULSION INTRAVENOUS at 12:13

## 2022-02-25 RX ADMIN — SODIUM CHLORIDE, SODIUM LACTATE, POTASSIUM CHLORIDE, AND CALCIUM CHLORIDE 100 ML/HR: 600; 310; 30; 20 INJECTION, SOLUTION INTRAVENOUS at 10:53

## 2022-02-25 NOTE — H&P
Chief complaint/HPI and PMH:  Please refer to outpatient note below or attached to the chart. Today's exam:  LUNGS:  Clear and equal.  COR:  RRR without murmurs heard. NEURO:  A and Oriented fully. I have thoroughly explained the preparation, procedure and sedation process to the patient as well as the risks and alternatives. They will sign informed consent prior to the procedure. Ethel Guerrero MD                          Patient:   Larisa Littlejohn  YOB: 1945   Date:                        01/25/2022 8:00 AM   Visit Type:                  Office Visit  Provider:   DONNA Gutiérrez  Referring Provider:  Owen Guerrero MD [facility]  Primary Care Provider: Owen Guerrero MD [facility]    This 67year old  patient was referred by Owen Guerrero MD.  This 67year old male presents for GERD, Anemia, Constipation, Family hx of colon cancer and Colon cancer screening. History of Present Illness:  1. GERD   2. Anemia   3. Constipation   4. Family hx of colon cancer   5. Colon cancer screening   69-year-old patient of Dr. Brett Evans with a past medical history of CHF, rheumatoid arthritis, obstructive sleep apnea, hypertension, hyperlipidemia, hearing loss and coronary artery disease s/p CABG and PCI seen at the request of Dr Owen Guerrero for weight loss and anemia with mild normocytic anemia as below. His mother was diagnosed with colon cancer at the age of 79. He underwent colonoscopy 3/29/17 was normal with exception of internal hemorrhoids. Surveillance was recommended 3/2022 for a family history of colon cancer. CT abdomen and pelvis 1/24/22 with marked hydronephrosis and enlargement of the left kidney. No obvious obstructing stone. No obvious lesion resulting in obstruction can be discerned. Urological consultation recommended. Bladder wall thickening. Moderate prostate enlargement. Fecal retention. 7 cm hiatal hernia. Degenerative scoliosis of the thoracic lumbar spine segments. Cardiomegaly     Labs 1/11/22: WBC 6.7, hemoglobin 12.6 L, Hematocrit 39.4, MCV 94, platelets 340, glucose 98, BUN 18, creatinine 1.44 H, total bilirubin 0.6, alkaline phosphatase 68, AST 19, ALT 11, total cholesterol 160, triglycerides 59, TSH 4.710 H, stool negative for blood    He denies prior history of anemia. He has seen no overt signs of GI bleed including bright red rectal bleeding or dark, tarry stools. He takes tramadol for back pain. He reports intermittent bloating and constipation and sometimes going a couple of days without having a bowel movement. He reports a 10 pound weight loss over the past year. He reports having a good appetite but not eating as much as he used too. He reports occasional reflux and takes Zantac about 3 times per week as needed. He denies nausea, vomiting or melena. He has a history of heart attack in 2014. He had a stent placed but this got blocked and had a 4 vessel CABG. He has a defibrillator. This has never fired.  He sees [facility], Dr. Jigar Amin    Past Medical/Surgical History:   (Detailed)  Disease/disorder Onset Date Management Date Comments     CABG 12/2014      Knee replacement 06/2014    Pacemaker/defibrillator         Hernia surgery       Kidney stents     Coronary artery disease       Rheumatoid arthritis       hearing loss       Hyperlipidemia       Hypertension       Obstructive sleep apnea-BIPAP         TURP       tonsillectomy       Hip surgery     Congestive heart failure       Anemia         Additional Medical History  Rheumatoid arthritis  Hearing loss  Coronary artery disease status post MI 2014  Hyperlipidemia  Hypertension  Obstructive sleep apnea, BiPAP    Additional Surgical History  TURP  Tonsillectomy  Hip surgery  CABG 12/2014  Knee replacement 6/2014   HERNIA REPAIR IN December 2016  Colonoscopy 2006 - internal hemorrhoids    Procedure History  Test Date Results Interp COLONOSCOPY 2017 Normal Colon Exam, Internal hemorrhoids    COLONOSCOPY 10/31/2011 Family history of colon cancer, Tortuous colon    COLONOSCOPY 10/31/2011 Family history of colon cancer, Tortuous colon      Family History:  (Detailed)  Relationship Family Member Name  Age at Death Condition Onset Age Cause of Death   Mother    Cancer, colon 79 N       Social History:  (Detailed)  Tobacco use reviewed. Preferred language is [place]. MARITAL STATUS/FAMILY/SOCIAL SUPPORT  Currently . Tobacco use status: Never smoked tobacco.  Smoking status: Never smoker. SMOKING STATUS  Type Smoking Status Usage Per Day Years Used Total Pack Years    Never smoker        ALCOHOL  There is a history of alcohol use. CAFFEINE  The patient uses caffeine. HOME ENVIRONMENT/SAFETY  The patient has not fallen in the last year. COMMENTS  NO HX OF PIERCINGS, IV DRUG USE    Current Medications:  Medication Directions   aspirin 81 mg tablet,delayed release take 1 Tablet by oral route  every day   atorvastatin 40 mg tablet take 1 tablet by oral route  every day   leflunomide 20 mg tablet take 1 tablet by oral route  every day Monday-Friday   metoprolol tartrate 25 mg tablet take 1 tablet by oral route 2 times every day   multivitamin tablet take 1 tablet by oral route  every day with food   nitroglycerin 0.4 mg sublingual tablet place 1 tablet by sublingual route at the 1st sign of attack; may repeat every 5 min until relief; if pain persists after 3 tablets in 15 min, prompt medical attention is recommended   spironolactone 25 mg tablet take 1 tablet by oral route  every day   tramadol 50 mg tablet take 1 Tablet by oral route 3 times every day as needed     Allergies:  Ingredient Reaction (Severity) Medication Name Comment   SULFA ([address]) UNKNOWN  SULFA. Reviewed, no changes. Review of Systems:  System Neg/Pos Details   Constitutional Positive Fatigue, Weight loss.    ENMT Positive Hearing deficit, Sleep apnea. Respiratory Positive Dyspnea. GI Positive Abdominal pain, Constipation, Heartburn, Reflux. Endocrine Positive Cold intolerance. Neuro Positive Dizziness. MS Positive Joint pain. Hema/Lymph Positive Anemia. All other review of systems are negative. Vital Signs:  BP mm/Hg Pulse Resp Pulse Ox Temp F Ht (Total in.) Weight (lbs.) Weight (oz.) BMI   100/60 66 16 95 97.60 70.00 150.00  21.52     Physical Exam:  GENERAL: A well nourished, well hydrated, patient who appears the stated age. [place]  SKIN: Extremities and face reveal no rashes. HEENT: Sclerae anicteric. No oral ulcerations or abnormal pigment of the lips. Pupils equal and reactive to light. CARDIOVASCULAR: Regular rate and rhythm. No murmurs, gallops or rubs. RESPIRATORY: Comfortable breathing with no accessory muscle use. Clear breath sounds with no rales or wheezes. GI: The abdomen is soft, nondistended, and nontender. Normal active bowel sounds. No enlargement of the liver or spleen. No masses palpable. RECTAL: Deferred. MUSCULOSKELETAL: Gait appears steady. NEUROLOGICAL: Gross memory appears intact. Patient is alert and oriented. PSYCHIATRIC: Mood appears appropriate with judgement intact. Assessment/Plan:  # Detail Type Description    1. Assessment Gastroesophageal reflux disease without esophagitis (K21.9). 2. Assessment Chronic constipation (K59.09). Plan Orders He is to schedule a follow-up visit to be determined after testing/procedure. 3. Assessment Weight loss (R63.4). 4. Assessment Anemia, unspecified type (D64.9). Plan Orders Further evaluations ordered today include [place] W/WO Cytology 48550 to be performed and EGD W/WO Cytology to be performed. 5. Assessment Family hx of colon cancer (Z80.0). 6. Assessment Colon cancer screening (Z12.11).     Provider Plan 77-year-old patient with a past medical history of CHF, rheumatoid arthritis, obstructive sleep apnea, hypertension, hyperlipidemia, hearing loss and coronary artery disease s/p CABG and PCI with defibrillator presenting with mild normocytic anemia, a 10 pound weight loss over the past year, a family history of colon cancer in his mother, constipation and reflux. ASA 3  - Constipation measures discussed, handout provided  - Begin 2 stool softeners nightly   - ECHO 5/6/21 with ejection fraction of 30-35%  - Anti reflux measures discussed, handout provided  - He is taking Zantac prn  - Upper endoscopy and colonoscopy. I have discussed with the patient the technique, benefits, alternatives and risks of the procedure, including but not limited to medication reaction, bleeding or perforation of the gastrointestinal tract. 2 day prep          Fall Risk Plan:  The patient has not fallen in the last year. The patient was checked out at 8:40 AM by Bobbi Nur. Elements of this note may have been dictated using speech recognition software. As a result, errors of speech recognition may have occurred. Provider:   Donna Dyson 01/25/2022 12:15 PM   Document generated by: [place]. Oksana Lan 01/25/2022    CC Providers:  Viola Belle MD   [facility]  [facility], [address]-      Electronically signed by [place].  Claudia Lagunas on 01/25/2022 12:16 PM

## 2022-02-25 NOTE — DISCHARGE INSTRUCTIONS
Gastrointestinal Esophagogastroduodenoscopy (EGD) - Upper Exam Discharge Instructions    1. Call Dr. Sakina Ho at [phone] for any problems or questions. 2. Contact the doctor's office for follow up appointment as directed. 3. Medication may cause drowsiness for several hours, therefore:  · Do not drive or operate machinery for remainder of the day. · No alcohol today. · Do not make any important or legal decisions for 24 hours. · Do not sign any legal documents for 24 hours. 5. Ordinarily, you may resume regular diet and activity after exam unless otherwise specified by your physician. 6. For mild soreness in your throat you may use Cepacol throat lozenges or warm salt-water gargles as needed. Gastrointestinal Colonoscopy/Flexible Sigmoidoscopy - Lower Exam Discharge Instructions  1. Ordinarily, you may resume regular diet and activity after exam unless otherwise specified by your physician. 2. Because of air put into your colon during exam, you may experience some abdominal distension, relieved by the passage of gas, for several hours. 3. Contact your physician if you have any of the following:  · Excessive amount of bleeding - large amount when having a bowel movement. Occasional specks of blood with bowel movement would not be unusual.  · Severe abdominal pain  · Fever or Chills  4. Polyp Removal - follow these additional instructions  · Clear liquid diet for the next meal (jell-o, broth, clear drinks)  · Soft diet for 24 hours, then resume regular diet   · Take Metamucil - 1 tablespoon in juice every morning for 3 days  · No Aspirin, Advil, Aleve, Nuprin, Ibuprofen, or medications that contain these drugs for 2 weeks. Any additional instructions:     Follow up with the office for pathology results  Soft diet tonight and advance to regular diet  Rx will be called in to your pharmacy  The office will call you with a follow up appointment      Instructions given to Nia Gonzales Roula Guillen and other family members.

## 2022-02-25 NOTE — PROCEDURES
Colonoscopy Procedure Note    Procedure: Colonoscopy    Pre-operative Diagnosis: Family hx of colon cancer - mother   Normocytic anemia      Post-operative Diagnosis: Normal colonoscopy to the terminal ileum, (With the exception of 1 or 2 sigmoid diverticula seen )   Internal hemorrhoids     Recommendations: The patient was advised not to drive today nor to make any important decisions. They may resume normal diet and medications unless otherwise instructed in recovery. Surveillance interval: 5 years if otherwise healthy     Anesthesia/Sedation: MAC,, (see separate report). Procedure Details:  Informed consent was obtained for the procedure, including anesthesia. Risks of perforation, hemorrhage, adverse drug reaction and aspiration were discussed. The patient was placed in the left lateral decubitus position. Based on the pre-procedure assessment, including review of the patient's medical history, medications, allergies, and review of systems, he had been deemed to be an appropriate candidate for this procedure. A rectal examination was performed. The colonoscope was inserted into the rectum and advanced under direct vision to the terminal ileum. The quality of the colonic preparation was adequate. A careful inspection was made as the colonoscope was withdrawn; findings and interventions are described below. Findings:   TERMINAL ILEUM: The terminal ileum was entered and appeared normal with a normal villous mucosa. COLON:  The colonic mucosa from the cecum to the rectum was carefully examined. The mucosa appeared normal with normal vascularity. There was no diverticulosis, inflammatory changes, mucosal polyps, raised lesions, vascular ectasias or abnormal pigmentation. ANUS/RECTUM: Anal exam reveals no masses sphincter tone is normal. Rectal exam reveals no masses or hemorrhoids. Prostate exam was unremarkable for enlargement or nodules.   Direct and retroflexed views of the rectum were normal without inflammation, polyps or masses. On retroflexed view the internal hemorrhoidal vessels were engorged. EBL: 0cc's. PATH:  None. Complications: None; patient tolerated the procedure well. Attending Attestation: I performed the procedure.     Jennifer Duong MD

## 2022-02-25 NOTE — ANESTHESIA POSTPROCEDURE EVALUATION
Procedure(s):  ESOPHAGOGASTRODUODENOSCOPY (EGD)  COLONOSCOPY/ 21/ PT HAS ICD PACEMAKER  ESOPHAGOGASTRODUODENAL (EGD) BIOPSY  ESOPHAGEAL DILATION. total IV anesthesia    Anesthesia Post Evaluation      Multimodal analgesia: multimodal analgesia not used between 6 hours prior to anesthesia start to PACU discharge  Patient location during evaluation: bedside  Patient participation: complete - patient participated  Level of consciousness: awake  Pain score: 1  Pain management: adequate  Airway patency: patent  Anesthetic complications: no  Cardiovascular status: acceptable  Respiratory status: acceptable  Hydration status: acceptable  Comments: Pt doing well. Post anesthesia nausea and vomiting:  none  Final Post Anesthesia Temperature Assessment:  Normothermia (36.0-37.5 degrees C)      INITIAL Post-op Vital signs:   Vitals Value Taken Time   /64 02/25/22 1307   Temp 37 °C (98.6 °F) 02/25/22 1257   Pulse 75 02/25/22 1313   Resp 15 02/25/22 1307   SpO2 100 % 02/25/22 1313   Vitals shown include unvalidated device data.

## 2022-02-25 NOTE — ANESTHESIA PREPROCEDURE EVALUATION
Relevant Problems   RESPIRATORY SYSTEM   (+) LEOBARDO (obstructive sleep apnea)      CARDIOVASCULAR   (+) CAD (coronary artery disease)   (+) Hypertension   (+) S/P CABG (coronary artery bypass graft)      ENDOCRINE   (+) RA (rheumatoid arthritis) (HCC)      HEMATOLOGY   (+) Postoperative anemia due to acute blood loss       Anesthetic History   No history of anesthetic complications            Review of Systems / Medical History  Patient summary reviewed and pertinent labs reviewed    Pulmonary        Sleep apnea           Neuro/Psych   Within defined limits           Cardiovascular    Hypertension      CHF (EF 30-35%)  Dysrhythmias   Pacemaker (ICD has never shocked him), CAD, cardiac stents and CABG    Exercise tolerance: <4 METS: Can walk  yards on flat ground without stopping     GI/Hepatic/Renal     GERD           Endo/Other      Hypothyroidism  Arthritis (RA)     Other Findings              Physical Exam    Airway  Mallampati: I  TM Distance: > 6 cm  Neck ROM: normal range of motion   Mouth opening: Normal     Cardiovascular    Rhythm: regular  Rate: normal         Dental    Dentition: Poor dentition     Pulmonary  Breath sounds clear to auscultation               Abdominal         Other Findings            Anesthetic Plan    ASA: 3  Anesthesia type: total IV anesthesia          Induction: Intravenous  Anesthetic plan and risks discussed with: Patient and Spouse

## 2022-02-26 LAB
H PYLORI AG TISS QL IMSTN: NEGATIVE
H PYLORI AG TISS QL IMSTN: NEGATIVE

## 2022-03-02 NOTE — PROCEDURES
Colonoscopy Procedure Note    Procedure: Colonoscop  Pre-operative Diagnosis: Screening for colon cancer  Family history of colon cancer in his mother     Post-operative Diagnosis:   Moderate diverticulosis predominantly left side without active inflammation     Recommendations: The patient was advised not to drive today nor to make any important decisions. They may resume normal diet and medications unless otherwise instructed in recovery. Surveillance interval: 5 years     Anesthesia/Sedation: MAC,, (see separate report). Procedure Details:  Informed consent was obtained for the procedure, including anesthesia. Risks of perforation, hemorrhage, adverse drug reaction and aspiration were discussed. The patient was placed in the left lateral decubitus position. Based on the pre-procedure assessment, including review of the patient's medical history, medications, allergies, and review of systems, he had been deemed to be an appropriate candidate for this procedure. A rectal examination was performed. The colonoscope was inserted into the rectum and advanced under direct vision to the terminal ileum. The quality of the colonic preparation was adequate. A careful inspection was made as the colonoscope was withdrawn; findings and interventions are described below. Findings:   TERMINAL ILEUM: The terminal ileum was entered and appeared normal with a normal villous mucosa. COLON:  The colonic mucosa from the cecum to the rectum was carefully examined. The mucosa appeared normal with normal vascularity. There was moderate diverticulosis on the left side of the colon without active inflammation. There was no inflammatory changes, mucosal polyps, raised lesions, vascular ectasias or abnormal pigmentation. ANUS/RECTUM: Anal exam reveals no masses or hemorrhoids, sphincter tone is normal. Rectal exam reveals no masses or hemorrhoids. Prostate exam was unremarkable for enlargement or nodules.   Direct and retroflexed views of the rectum were normal without significant hemorrhoidal engorgement or inflammation, polyps or masses. EBL: 0cc's. PATH:  None. Complications: None; patient tolerated the procedure well. Attending Attestation: I performed the procedure.     Cuca Westfall MD

## 2022-03-02 NOTE — PROCEDURES
Esophagogastroduodenoscopy (EGD) Procedure Note 2/25/22    Procedure: EGD    Pre-operative Diagnosis: Dysphagia    Post-operative Diagnosis: Small hiatal hernia  Erosive esophagitis-grade a (biopsies obtained)  Gastritis-JOHN test pending     Recommendations:  Continue same treatment. Follow up:   Path. Anesthesia/Sedation:  MAC, (see separate report). Procedure Details:  Informed consent was obtained for the procedure, including sedation. Risks of perforation, hemorrhage, adverse drug reaction and aspiration were discussed. The patient was placed in the left lateral decubitus position. Based on the pre-procedure assessment, including review of the patient's medical history, medications, allergies, and review of systems, he had been deemed to be an appropriate candidate for anesthesia. The patient was monitored continuously with ECG tracing, pulse oximetry, blood pressure monitoring, and direct observations. Please refer to the anesthesia record for details and dosages of medications. The esophagus was intubated with direct visualization. The esophagus, stomach and duodenum were traversed to the full extent of the endoscope. Retroflexed views of the cardia and fundus were performed. The stomach was fully insufflated and deflated. Findings:   OROPHARYNX: The oropharynx was briefly viewed on entry and withdrawal with very brief evaluation of the cords, arytenoids and pyriform sinuses. No abnormalities found. ESOPHAGUS:  The esophagus was revealed some inflammation at the GE junction, erosions less than 1 cm. This was above a small 1 cm hiatal hernia. Biopsies at the GE junction were obtained. [address]-[place] Asa was passed. No significant trauma on repeat endoscopy. The endoscope was easily passed into the gastric pouch and there was small hiatal hernia. STOMACH: The gastric pouch inflated and deflated normally.   The mucosal surface and gastric rugae were normal without erosions, ulcerations, raised lesions, vascular ectasias or other anomalies. The pylorus was patent to passage of the endoscope without difficulty. DUODENUM:  The endoscope was easily passed into the third section of the duodenum. The villous mucosa was normal without blunting or scalloped folds. There were no mucosal erosions, ulcerations, raised lesions or vascular ectasias. EBL: 0 cc's. Pathology speciment: Biopsies GE junction and JOHN test .           Complications: No apparent complications and the patient tolerated sedation and the procedure well. Attending Attestation: I performed the procedure.     Elan Lopez MD

## 2022-03-18 PROBLEM — G47.00 FREQUENT NOCTURNAL AWAKENING: Status: ACTIVE | Noted: 2021-05-26

## 2022-03-18 PROBLEM — I25.5 ISCHEMIC CARDIOMYOPATHY: Status: ACTIVE | Noted: 2017-05-10

## 2022-03-18 PROBLEM — G47.31 CENTRAL SLEEP APNEA: Status: ACTIVE | Noted: 2021-05-26

## 2022-03-19 PROBLEM — G47.33 OSA (OBSTRUCTIVE SLEEP APNEA): Status: ACTIVE | Noted: 2021-05-26

## 2022-03-19 PROBLEM — I42.9 CARDIOMYOPATHY (HCC): Status: ACTIVE | Noted: 2018-08-30

## 2022-03-19 PROBLEM — Z95.810 BIVENTRICULAR AUTOMATIC IMPLANTABLE CARDIOVERTER DEFIBRILLATOR IN SITU: Status: ACTIVE | Noted: 2019-04-30

## 2022-05-12 ENCOUNTER — HOSPITAL ENCOUNTER (OUTPATIENT)
Dept: LAB | Age: 77
Discharge: HOME | End: 2022-05-12
Payer: MEDICARE

## 2022-05-12 DIAGNOSIS — I25.5 ISCHEMIC CARDIOMYOPATHY: ICD-10-CM

## 2022-05-12 LAB
ANION GAP SERPL CALC-SCNC: 5 MMOL/L (ref 7–16)
BUN SERPL-MCNC: 21 MG/DL (ref 8–23)
CALCIUM SERPL-MCNC: 9.1 MG/DL (ref 8.3–10.4)
CHLORIDE SERPL-SCNC: 108 MMOL/L (ref 98–107)
CO2 SERPL-SCNC: 26 MMOL/L (ref 21–32)
CREAT SERPL-MCNC: 1.3 MG/DL (ref 0.8–1.5)
GLUCOSE SERPL-MCNC: 62 MG/DL (ref 65–100)
POTASSIUM SERPL-SCNC: 4.6 MMOL/L (ref 3.5–5.1)
SODIUM SERPL-SCNC: 139 MMOL/L (ref 138–145)

## 2022-05-12 PROCEDURE — 36415 COLL VENOUS BLD VENIPUNCTURE: CPT

## 2022-05-12 PROCEDURE — 80048 BASIC METABOLIC PNL TOTAL CA: CPT

## 2022-08-23 PROCEDURE — 93295 DEV INTERROG REMOTE 1/2/MLT: CPT | Performed by: INTERNAL MEDICINE

## 2022-08-23 PROCEDURE — 93296 REM INTERROG EVL PM/IDS: CPT | Performed by: INTERNAL MEDICINE

## 2022-10-13 DIAGNOSIS — Z95.810 BIVENTRICULAR AUTOMATIC IMPLANTABLE CARDIOVERTER DEFIBRILLATOR IN SITU: ICD-10-CM

## 2022-10-13 DIAGNOSIS — I25.5 ISCHEMIC CARDIOMYOPATHY: ICD-10-CM

## 2022-10-13 DIAGNOSIS — I25.5 ISCHEMIC CARDIOMYOPATHY: Primary | ICD-10-CM

## 2022-11-09 PROBLEM — I50.22 CHRONIC HFREF (HEART FAILURE WITH REDUCED EJECTION FRACTION) (HCC): Status: ACTIVE | Noted: 2022-11-09

## 2022-11-09 NOTE — PROGRESS NOTES
[facility] CARDIOLOGY  [address], [address], [address]  [facility], [address] [facility]  PHONE: [phone]      11/10/22    NAME:  Kathryn Tinoco  : 1945  MRN: [id number]         SUBJECTIVE:   Kathryn Tinoco is a 65 y.o. male seen for a follow up visit regarding the following:     Chief Complaint   Patient presents with    Coronary Artery Disease            HPI:  Follow up  Coronary Artery Disease   . Follow up CAD/CABG with ischemic CM, biventricular ICD, hyperlipidemia, hypertension. Rheumatoid arthritis. Echo stable EF 30-35%. Ascending aorta was mildly elevated on this study at 4.2 cm    He says he feels ok but that he has had some feelings that \"make me wonder\", vague historian, difficult to tease out symptoms, says he doesn't think he has chest discomfort with exertion and says he is short of breath but that it is \"probably not\" worse than it has been. His most bitter complaint is fatigue. He does not monitor BP at home, when he does he sees it is low. Wearing his biPAP faithfully. Having annual blood work done with his PCP soon. Eating on a regular basis and feels he is choosing good foods. Past cardiac history:   KEVIN - on biPAP   Dec 2014 - LIMA to LAD, SVG to Dx, OM and PDA, EF 50%   Oct 2015 - EF 40-45%, normal valves, RVSP - highest tolerated doses beta blocker, ARB   2016 - EF 40-45%, RVSP 30, mildly dilated RV   Stress MPI with Lexiscan - inferior scar with jorge infarct ischemia, complicated by subdiaphragmatic artifact, EF 48%,   I personally reviewed images, SDS 2, significant subdiaphragmatic artifact , EF unchanged from baseline.      May 2018 - atretic LIMA to patent prox to mid LAD, 95% apical stenosis, patent SVG to OM, Dx and PDA   2018 - EF 25-30%, moderate MR, RVSP 27 (EF down from 40-45%)   Aug 2018 - [place] biventricular ICD   2019- 30-35% mild MR RVSP 23   2021- EF 30-35%, mild MR/TR, RVE with diminished RV function, RVSP 39  2022 EF 30-35%, ascending aorta 4.2 cm, mild/mod MR          Key CAD CHF Meds            spironolactone (ALDACTONE) 25 MG tablet (Taking)    Sig: TAKE 1/2 TABLET EVERY DAY    sacubitril-valsartan (ENTRESTO) 24-26 MG per tablet (Taking)    Sig - Route: Take 1 tablet by mouth 2 times daily - Oral    metoprolol tartrate (LOPRESSOR) 25 MG tablet (Taking)    Sig - Route: Take 1 tablet by mouth 2 times daily - Oral    atorvastatin (LIPITOR) 40 MG tablet (Taking)    Sig - Route: Take 1 tablet by mouth daily - Oral              Past Medical History, Past Surgical History, Family history, Social History, and Medications were all reviewed with the patient today and updated as necessary. Prior to Admission medications    Medication Sig Start Date End Date Taking? Authorizing Provider   spironolactone (ALDACTONE) 25 MG tablet TAKE 1/2 TABLET EVERY DAY 11/10/22  Yes Samira Camacho MD   sacubitril-valsartan (ENTRESTO) 24-26 MG per tablet Take 1 tablet by mouth 2 times daily 11/10/22  Yes Samira Camacho MD   metoprolol tartrate (LOPRESSOR) 25 MG tablet Take 1 tablet by mouth 2 times daily 11/10/22  Yes Samira Camacho MD   atorvastatin (LIPITOR) 40 MG tablet Take 1 tablet by mouth daily 11/10/22  Yes Samira Camacho MD   ascorbic acid (VITAMIN C) 500 MG tablet Take by mouth   Yes Ar Automatic Reconciliation   aspirin 81 MG EC tablet Take 81 mg by mouth   Yes Ar Automatic Reconciliation   leflunomide (ARAVA) 20 MG tablet Take 20 mg by mouth   Yes Ar Automatic Reconciliation   nitroGLYCERIN (NITROSTAT) 0.4 MG SL tablet Place 0.4 mg under the tongue 5/17/21  Yes Ar Automatic Reconciliation   traMADol (ULTRAM) 50 MG tablet Take 50 mg by mouth.    Yes Ar Automatic Reconciliation     Allergies   Allergen Reactions    Sulfa Antibiotics Swelling     Other reaction(s): unknown     Past Medical History:   Diagnosis Date    Anemia     Aspirin long-term use     pt states he does not take plavix and hasn't for approximately a year    CAD (coronary artery disease) 2014    STEMI-CABG 4 vessels    Chronic pain     lower back pain     Decreased cardiac ejection fraction     GERD (gastroesophageal reflux disease)     managed with medication as needed     Heart failure ([facility] [address]     echo 04/24/19 EF 30-35%    [place] (hard of hearing)     hearing aides    Hypertension     Ischemic cardiomyopathy     LBBB (left bundle branch block) 11/08/2016    MI, old 2014    RA (rheumatoid arthritis) (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 Manisha Cullen Right     inguinal, ventral-after bypass    LUMBAR FUSION      PACEMAKER  2018    icd, no shocks     TOTAL KNEE ARTHROPLASTY Left      Family History   Problem Relation Age of Onset    Cancer Mother         pancreatic    Heart Disease Father     Heart Attack Father      Social History     Tobacco Use    Smoking status: Never    Smokeless tobacco: Never   Substance Use Topics    Alcohol use: Yes       ROS:    Review of Systems   Constitutional: Positive for malaise/fatigue. Cardiovascular:  Positive for chest pain. Negative for leg swelling. Respiratory:  Positive for shortness of breath.          PHYSICAL EXAM:   /70   Pulse 62   Ht 5' 10\" (1.778 m)   Wt 160 lb (72.6 kg)   BMI 22.96 kg/m²      Wt Readings from Last 3 Encounters:   11/10/22 160 lb (72.6 kg)   11/03/22 146 lb (66.2 kg)   05/12/22 146 lb 8 oz (66.5 kg)     BP Readings from Last 3 Encounters:   11/10/22 112/70   11/03/22 108/62   05/12/22 100/62     Pulse Readings from Last 3 Encounters:   11/10/22 62   05/12/22 72   11/12/21 60           Physical Exam  Constitutional:       Appearance: Normal appearance. HENT:      Head: Normocephalic and atraumatic. Eyes:      Conjunctiva/sclera: Conjunctivae normal.   Neck:      Vascular: No carotid bruit. Cardiovascular:      Rate and Rhythm: Normal rate and regular rhythm. Heart sounds: No murmur heard. No friction rub. No gallop. Pulmonary:      Effort: No respiratory distress. Breath sounds: No wheezing or rales. Musculoskeletal:         General: No swelling. Cervical back: Neck supple. Skin:     General: Skin is warm and dry. Neurological:      General: No focal deficit present. Mental Status: He is alert. Psychiatric:         Mood and Affect: Mood is depressed. Behavior: Behavior normal.       Medical problems and test results were reviewed with the patient today. DATA REVIEW      BMP  Lab Results   Component Value Date/Time     05/12/2022 08:37 AM    K 4.6 05/12/2022 08:37 AM     05/12/2022 08:37 AM    CO2 26 05/12/2022 08:37 AM    BUN 21 05/12/2022 08:37 AM    CREATININE 1.30 05/12/2022 08:37 AM    GLUCOSE 62 05/12/2022 08:37 AM    CALCIUM 9.1 05/12/2022 08:37 AM          EKG        CXR/IMAGING        DEVICE INTERROGATION    10/13/22 - 1% afib burden, 98% CRT-pacing, no events, normal function    OUTSIDE RECORDS REVIEW    Records from outside providers have been reviewed and summarized as noted in the HPI, past history and data review sections of this note, and reviewed with patient. .       ASSESSMENT and PLAN    Jeny Dee was seen today for coronary artery disease.     Diagnoses and all orders for this visit:    Ischemic cardiomyopathy    Coronary artery disease of native artery of native heart with stable angina pectoris (HCC)    Chronic HFrEF (heart failure with reduced ejection fraction) ([facility] [address]  -     Nuclear stress test with myocardial perfusion; Future    Essential hypertension    Dyslipidemia    Chronic fatigue and malaise    Biventricular automatic implantable cardioverter defibrillator in situ    Enlarged thoracic aorta (HCC)    Other orders  -     spironolactone (ALDACTONE) 25 MG tablet; TAKE 1/2 TABLET EVERY DAY  -     sacubitril-valsartan (ENTRESTO) 24-26 MG per tablet; Take 1 tablet by mouth 2 times daily  -     metoprolol tartrate (LOPRESSOR) 25 MG tablet; Take 1 tablet by mouth 2 times daily  -     atorvastatin (LIPITOR) 40 MG tablet; Take 1 tablet by mouth daily        IMPRESSION:    Fatigue, ? HF related, ? Hypotension. He will begin checking BP at home and return with BP diary at follow up    Meanwhile, having vague chest sensations, last cath 4+ years ago, no recent ischemic evaluation. Unable to walk TM d/t debility and presence of pacemaker. Normal device check today, no events, 98% bi-V paced    Having routine labs with PCP, encouraged to contact him with symptoms in case he wants to add additional labs, and we have requested copies. Unknown lipid status, awaiting labs    I offered referral to cardiac rehab and reviewed its potential benefits. He declines for now. Mild aortic root dilatation without AI, continue serial evaluation        Return for AFTER PROCEDURE TO REVIEW RESULTS. Thank you for allowing me to participate in this patient's care. Please call or contact me if there are any questions or concerns regarding the above.       Renny Aly MD  11/10/22  8:58 AM

## 2022-11-10 ENCOUNTER — OFFICE VISIT (OUTPATIENT)
Dept: CARDIOLOGY CLINIC | Age: 77
End: 2022-11-10
Payer: MEDICARE

## 2022-11-10 VITALS
DIASTOLIC BLOOD PRESSURE: 70 MMHG | BODY MASS INDEX: 22.9 KG/M2 | HEART RATE: 62 BPM | HEIGHT: 70 IN | SYSTOLIC BLOOD PRESSURE: 112 MMHG | WEIGHT: 160 LBS

## 2022-11-10 DIAGNOSIS — I50.22 CHRONIC HFREF (HEART FAILURE WITH REDUCED EJECTION FRACTION) (HCC): ICD-10-CM

## 2022-11-10 DIAGNOSIS — Z95.810 BIVENTRICULAR AUTOMATIC IMPLANTABLE CARDIOVERTER DEFIBRILLATOR IN SITU: ICD-10-CM

## 2022-11-10 DIAGNOSIS — I25.5 ISCHEMIC CARDIOMYOPATHY: Primary | ICD-10-CM

## 2022-11-10 DIAGNOSIS — I10 ESSENTIAL HYPERTENSION: ICD-10-CM

## 2022-11-10 DIAGNOSIS — E78.5 DYSLIPIDEMIA: ICD-10-CM

## 2022-11-10 DIAGNOSIS — R53.82 CHRONIC FATIGUE AND MALAISE: ICD-10-CM

## 2022-11-10 DIAGNOSIS — R53.81 CHRONIC FATIGUE AND MALAISE: ICD-10-CM

## 2022-11-10 DIAGNOSIS — I77.89 ENLARGED THORACIC AORTA (HCC): ICD-10-CM

## 2022-11-10 DIAGNOSIS — I25.118 CORONARY ARTERY DISEASE OF NATIVE ARTERY OF NATIVE HEART WITH STABLE ANGINA PECTORIS (HCC): ICD-10-CM

## 2022-11-10 PROCEDURE — G8484 FLU IMMUNIZE NO ADMIN: HCPCS | Performed by: INTERNAL MEDICINE

## 2022-11-10 PROCEDURE — 1036F TOBACCO NON-USER: CPT | Performed by: INTERNAL MEDICINE

## 2022-11-10 PROCEDURE — 3078F DIAST BP <80 MM HG: CPT | Performed by: INTERNAL MEDICINE

## 2022-11-10 PROCEDURE — 1123F ACP DISCUSS/DSCN MKR DOCD: CPT | Performed by: INTERNAL MEDICINE

## 2022-11-10 PROCEDURE — G8420 CALC BMI NORM PARAMETERS: HCPCS | Performed by: INTERNAL MEDICINE

## 2022-11-10 PROCEDURE — 3074F SYST BP LT 130 MM HG: CPT | Performed by: INTERNAL MEDICINE

## 2022-11-10 PROCEDURE — G8427 DOCREV CUR MEDS BY ELIG CLIN: HCPCS | Performed by: INTERNAL MEDICINE

## 2022-11-10 PROCEDURE — 99214 OFFICE O/P EST MOD 30 MIN: CPT | Performed by: INTERNAL MEDICINE

## 2022-11-10 RX ORDER — SPIRONOLACTONE 25 MG/1
TABLET ORAL
Qty: 45 TABLET | Refills: 3 | Status: SHIPPED | OUTPATIENT
Start: 2022-11-10

## 2022-11-10 RX ORDER — ATORVASTATIN CALCIUM 40 MG/1
40 TABLET, FILM COATED ORAL DAILY
Qty: 90 TABLET | Refills: 3 | Status: SHIPPED | OUTPATIENT
Start: 2022-11-10

## 2022-11-10 RX ORDER — SACUBITRIL AND VALSARTAN 24; 26 MG/1; MG/1
1 TABLET, FILM COATED ORAL 2 TIMES DAILY
Qty: 180 TABLET | Refills: 3 | Status: SHIPPED | OUTPATIENT
Start: 2022-11-10

## 2022-11-10 ASSESSMENT — ENCOUNTER SYMPTOMS: SHORTNESS OF BREATH: 1

## 2022-11-10 NOTE — PATIENT INSTRUCTIONS
1. Monitor BP at home 2-3 days a week, twice on those days, at random times, at least 2 hours after medications  2. Keep a diary to include date, time, and BP reading  3. Bring your BP diary and monitor to each office visit  4. Notify us if BP readings are consistently greater than 140/90 or less than 95/50.  5. To help keep BP regulated, aim to get at least 30 minutes of moderate physical activity daily, and follow a low sodium diet regularly by avoiding processed foods or added salt.

## 2022-12-06 DIAGNOSIS — I50.22 CHRONIC HFREF (HEART FAILURE WITH REDUCED EJECTION FRACTION) (HCC): ICD-10-CM

## 2022-12-06 DIAGNOSIS — Z95.810 BIVENTRICULAR AUTOMATIC IMPLANTABLE CARDIOVERTER DEFIBRILLATOR IN SITU: ICD-10-CM

## 2022-12-06 DIAGNOSIS — I42.9 CARDIOMYOPATHY (HCC): Primary | ICD-10-CM

## 2022-12-29 NOTE — PROGRESS NOTES
[facility] CARDIOLOGY  [address], [address], [address]  [facility], [address] [facility]  PHONE: [phone]      22    NAME:  Tanna Bah  : 1945  MRN: [id number]         SUBJECTIVE:   Tanna Bah is a 65 y.o. male seen for a follow up visit regarding the following:     Chief Complaint   Patient presents with    Follow-up Chronic Condition    Results     [facility] follow up              HPI:  Follow up  Follow-up Chronic Condition and Results ([facility] follow up)   . Follow up CAD/CABG with ischemic CM, biventricular ICD, hyperlipidemia, hypertension. Rheumatoid arthritis. Echo stable EF 30-35%. Ascending aorta was mildly elevated on this study at 4.2 cm. Stress perfusion study likewise appears stable/low risk with minimal apical reversibility, known inferior scar. He continues to complain of chronic fatigue. No chest discomfort, still wearing biPAP. No edema or palpitations. Normal device function with 98% bi-V pacing          Past cardiac history:   KEVIN - on biPAP   Dec 2014 - LIMA to LAD, SVG to Dx, OM and PDA, EF 50%   Oct 2015 - EF 40-45%, normal valves, RVSP - highest tolerated doses beta blocker, ARB   2016 - EF 40-45%, RVSP 30, mildly dilated RV   Stress MPI with Lexiscan - inferior scar with jorge infarct ischemia, complicated by subdiaphragmatic artifact, EF 48%,   I personally reviewed images, SDS 2, significant subdiaphragmatic artifact , EF unchanged from baseline.      May 2018 - atretic LIMA to patent prox to mid LAD, 95% apical stenosis, patent SVG to OM, Dx and PDA   2018 - EF 25-30%, moderate MR, RVSP 27 (EF down from 40-45%)   Aug 2018 - [facility] biventricular ICD   2019- 30-35% mild MR RVSP 23   2021- EF 30-35%, mild MR/TR, RVE with diminished RV function, RVSP 39  2022        EF 30-35%, ascending aorta 4.2 cm, mild/mod MR        NST - fixed inferior defect with small area of reversibility at the apex                   Key CAD CHF Meds spironolactone (ALDACTONE) 25 MG tablet (Taking)    Sig: TAKE 1/2 TABLET EVERY DAY    sacubitril-valsartan (ENTRESTO) 24-26 MG per tablet (Taking)    Sig - Route: Take 1 tablet by mouth 2 times daily - Oral    metoprolol tartrate (LOPRESSOR) 25 MG tablet (Taking)    Sig - Route: Take 1 tablet by mouth 2 times daily - Oral    atorvastatin (LIPITOR) 40 MG tablet (Taking)    Sig - Route: Take 1 tablet by mouth daily - Oral          Key Antihyperglycemic Medications       Patient is on no antihyperglycemic meds. Past Medical History, Past Surgical History, Family history, Social History, and Medications were all reviewed with the patient today and updated as necessary. Prior to Admission medications    Medication Sig Start Date End Date Taking? Authorizing Provider   spironolactone (ALDACTONE) 25 MG tablet TAKE 1/2 TABLET EVERY DAY 11/10/22  Yes Yves Grimaldo MD   sacubitril-valsartan (ENTRESTO) 24-26 MG per tablet Take 1 tablet by mouth 2 times daily 11/10/22  Yes Yves Grimaldo MD   metoprolol tartrate (LOPRESSOR) 25 MG tablet Take 1 tablet by mouth 2 times daily 11/10/22  Yes Yves Grimaldo MD   atorvastatin (LIPITOR) 40 MG tablet Take 1 tablet by mouth daily 11/10/22  Yes Yves Grimaldo MD   ascorbic acid (VITAMIN C) 500 MG tablet Take by mouth   Yes Ar Automatic Reconciliation   aspirin 81 MG EC tablet Take 81 mg by mouth   Yes Ar Automatic Reconciliation   leflunomide (ARAVA) 20 MG tablet Take 20 mg by mouth   Yes Ar Automatic Reconciliation   nitroGLYCERIN (NITROSTAT) 0.4 MG SL tablet Place 0.4 mg under the tongue 5/17/21  Yes Ar Automatic Reconciliation   traMADol (ULTRAM) 50 MG tablet Take 50 mg by mouth as needed.    Yes Ar Automatic Reconciliation     Allergies   Allergen Reactions    Sulfa Antibiotics Swelling     Other reaction(s): unknown     Past Medical History:   Diagnosis Date    Anemia     Aspirin long-term use     pt states he does not take plavix and hasn't for approximately a year    CAD (coronary artery disease) 2014    STEMI-CABG 4 vessels    Chronic pain     lower back pain     Decreased cardiac ejection fraction     GERD (gastroesophageal reflux disease)     managed with medication as needed     Heart failure ([facility]     echo 04/24/19 EF 30-35%    [place] (hard of hearing)     hearing aides    Hypertension     Ischemic cardiomyopathy     LBBB (left bundle branch block) 11/08/2016    MI, old 2014    RA (rheumatoid arthritis) ([facility] [address]     S/P CABG x 4 2014    Sleep apnea     bi pap    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 Magana Finger Right     inguinal, ventral-after bypass    LUMBAR FUSION      PACEMAKER  2018    icd, no shocks     TOTAL KNEE ARTHROPLASTY Left      Family History   Problem Relation Age of Onset    Cancer Mother         pancreatic    Heart Disease Father     Heart Attack Father      Social History     Tobacco Use    Smoking status: Never    Smokeless tobacco: Never   Substance Use Topics    Alcohol use: Yes       ROS:    Review of Systems   Constitutional: Positive for malaise/fatigue. Cardiovascular:  Negative for chest pain, leg swelling and near-syncope. Respiratory:  Negative for shortness of breath.          PHYSICAL EXAM:   /76   Pulse 64   Ht 5' 10\" (1.778 m)   Wt 150 lb (68 kg)   BMI 21.52 kg/m²      Wt Readings from Last 3 Encounters:   12/30/22 150 lb (68 kg)   11/30/22 160 lb (72.6 kg)   11/10/22 160 lb (72.6 kg)     BP Readings from Last 3 Encounters:   12/30/22 110/76   11/30/22 130/84   11/10/22 112/70     Pulse Readings from Last 3 Encounters:   12/30/22 64   11/30/22 75   11/10/22 62           Physical Exam  Constitutional:       Appearance: Normal appearance. HENT:      Head: Normocephalic and atraumatic. Eyes:      Conjunctiva/sclera: Conjunctivae normal.   Neck:      Vascular: No carotid bruit. Cardiovascular:      Rate and Rhythm: Normal rate and regular rhythm. Heart sounds: No murmur heard. No friction rub. No gallop. Pulmonary:      Effort: No respiratory distress. Breath sounds: No wheezing or rales. Musculoskeletal:         General: No swelling. Cervical back: Neck supple. Skin:     General: Skin is warm and dry. Neurological:      General: No focal deficit present. Mental Status: He is alert. Psychiatric:         Mood and Affect: Mood normal.         Behavior: Behavior normal.       Medical problems and test results were reviewed with the patient today. DATA REVIEW      BMP  Lab Results   Component Value Date/Time     05/12/2022 08:37 AM    K 4.6 05/12/2022 08:37 AM     05/12/2022 08:37 AM    CO2 26 05/12/2022 08:37 AM    BUN 21 05/12/2022 08:37 AM    CREATININE 1.30 05/12/2022 08:37 AM    GLUCOSE 62 05/12/2022 08:37 AM    CALCIUM 9.1 05/12/2022 08:37 AM        EKG        CXR/IMAGING        DEVICE INTERROGATION        OUTSIDE RECORDS REVIEW    Records from outside providers have been reviewed and summarized as noted in the HPI, past history and data review sections of this note, and reviewed with patient. .       ASSESSMENT and PLAN    Matthias Mortimer was seen today for follow-up chronic condition and results.     Diagnoses and all orders for this visit:    Chronic HFrEF (heart failure with reduced ejection fraction) ([address]  -     [address] [facility] Cardiopulmonary Rehabilitation    Enlarged thoracic aorta [facility])    Ischemic cardiomyopathy    Coronary artery disease of native artery of native heart with stable angina pectoris (HCC)    Biventricular automatic implantable cardioverter defibrillator in situ    S/P CABG (coronary artery bypass graft)    Central sleep apnea        IMPRESSION:      Ongoing fatigue, minimal HF therapy d/t hypotension. Offered to try to reduce or eliminate spironolactone to assess fatigue but he is hesitant and states he just lacks motivation to do much. Will instead refer to cardiac rehab for supervised exercise which will also allow monitoring of his BP    Apply for patient assistance with Entresto    Discussed SGLT2i therapy, currently declines d/t cost but will consider going forward. Wearing his biPAP and having routine labs at his physical next month    No angina and no significant ischemia, continue medical therapy    NYHA III      Return in about 3 months (around 3/30/2023). Thank you for allowing me to participate in this patient's care. Please call or contact me if there are any questions or concerns regarding the above.       Abdias Eaton MD  12/30/22  8:22 AM

## 2022-12-30 ENCOUNTER — OFFICE VISIT (OUTPATIENT)
Dept: CARDIOLOGY CLINIC | Age: 77
End: 2022-12-30
Payer: MEDICARE

## 2022-12-30 VITALS
WEIGHT: 150 LBS | DIASTOLIC BLOOD PRESSURE: 76 MMHG | HEART RATE: 64 BPM | HEIGHT: 70 IN | SYSTOLIC BLOOD PRESSURE: 110 MMHG | BODY MASS INDEX: 21.47 KG/M2

## 2022-12-30 DIAGNOSIS — I77.89 ENLARGED THORACIC AORTA (HCC): ICD-10-CM

## 2022-12-30 DIAGNOSIS — I25.118 CORONARY ARTERY DISEASE OF NATIVE ARTERY OF NATIVE HEART WITH STABLE ANGINA PECTORIS (HCC): ICD-10-CM

## 2022-12-30 DIAGNOSIS — Z95.1 S/P CABG (CORONARY ARTERY BYPASS GRAFT): ICD-10-CM

## 2022-12-30 DIAGNOSIS — G47.31 CENTRAL SLEEP APNEA: ICD-10-CM

## 2022-12-30 DIAGNOSIS — Z95.810 BIVENTRICULAR AUTOMATIC IMPLANTABLE CARDIOVERTER DEFIBRILLATOR IN SITU: ICD-10-CM

## 2022-12-30 DIAGNOSIS — I50.22 CHRONIC HFREF (HEART FAILURE WITH REDUCED EJECTION FRACTION) (HCC): Primary | ICD-10-CM

## 2022-12-30 DIAGNOSIS — I25.5 ISCHEMIC CARDIOMYOPATHY: ICD-10-CM

## 2022-12-30 PROCEDURE — 1123F ACP DISCUSS/DSCN MKR DOCD: CPT | Performed by: INTERNAL MEDICINE

## 2022-12-30 PROCEDURE — 3078F DIAST BP <80 MM HG: CPT | Performed by: INTERNAL MEDICINE

## 2022-12-30 PROCEDURE — G8427 DOCREV CUR MEDS BY ELIG CLIN: HCPCS | Performed by: INTERNAL MEDICINE

## 2022-12-30 PROCEDURE — G8484 FLU IMMUNIZE NO ADMIN: HCPCS | Performed by: INTERNAL MEDICINE

## 2022-12-30 PROCEDURE — G8420 CALC BMI NORM PARAMETERS: HCPCS | Performed by: INTERNAL MEDICINE

## 2022-12-30 PROCEDURE — 3074F SYST BP LT 130 MM HG: CPT | Performed by: INTERNAL MEDICINE

## 2022-12-30 PROCEDURE — 99214 OFFICE O/P EST MOD 30 MIN: CPT | Performed by: INTERNAL MEDICINE

## 2022-12-30 PROCEDURE — 1036F TOBACCO NON-USER: CPT | Performed by: INTERNAL MEDICINE

## 2022-12-30 ASSESSMENT — ENCOUNTER SYMPTOMS: SHORTNESS OF BREATH: 0

## 2022-12-30 NOTE — PATIENT INSTRUCTIONS
Patient Education        Heart Failure: Care Instructions  Overview     Heart failure occurs when your heart does not pump as much blood as the body needs. Failure does not mean that the heart has stopped pumping but rather that it is not pumping as well as it should. Over time, this causes fluid buildup in your lungs and other parts of your body. Fluid buildup can cause shortness of breath, fatigue, swollen ankles, and other problems. Heart failure is treated with medicines, a heart-healthy lifestyle, and the steps you take to check your symptoms. Treatment can slow the disease, help you feel better, and help keep you out of the hospital. Treatment may also help you live longer. Follow-up care is a key part of your treatment and safety. Be sure to make and go to all appointments, and call your doctor if you are having problems. It's also a good idea to know your test results and keep a list of the medicines you take. How can you care for yourself at home? Medicines    Be safe with medicines. Take your medicines exactly as prescribed. Call your doctor if you think you are having a problem with your medicine. You will get more details on the specific medicines your doctor prescribes. Medicines can help your heart work better, help you feel better, and help keep you out of the hospital. Medicines may also help you live longer. Talk with your doctor or pharmacist before you take a new prescription or over-the-counter medicine. Ask if the medicine is safe for you to take. Some medicines can affect your heart and make heart failure worse. Others may keep your heart failure medicines from working right. Over-the-counter medicines that you may need to avoid include herbal supplements, vitamins, pain relievers called NSAIDs, antacids, laxatives, and cough, cold, flu, or sinus medicine. Diet    Eat heart-healthy foods. These foods include vegetables, fruits, nuts, beans, lean meat, fish, and whole grains. Your doctor may suggest that you limit sodium. Your doctor can tell you how much sodium is right for you. An example is less than 3,000 mg a day. This includes all the salt you eat in cooking or in packaged foods. People get most of their sodium from processed foods. Fast food and restaurant meals also tend to be very high in sodium. Limit your fluid intake if your doctor tells you to. Your doctor will tell you how much fluid you can have in a day. Symptoms    Weigh yourself without clothing at the same time each day. Record your weight. Call your doctor if you have a sudden weight gain, such as more than 2 to 3 pounds in a day or 5 pounds in a week. (Your doctor may suggest a different range of weight gain.) A sudden weight gain may mean that your heart failure is getting worse. Check your symptoms every day to watch for changes. Know what to do if your symptoms get worse. Activity    Be active. Do not start to exercise until you have talked with your doctor. Together you can make an exercise program that is enjoyable and safe for you. Regular exercise can make your heart and your body stronger. Being active can help you feel better too. With your doctor, plan how often, how long, and how hard you will be active. Don't exercise too hard because it can put stress on your heart. If your doctor has not set you up with a cardiac rehabilitation (rehab) program, ask if it's right for you. Cardiac rehab can give you education and support that help you stay as healthy as possible. When you exercise, watch for signs that your heart is working too hard. You are pushing yourself too hard if you cannot talk while you are exercising. If you become short of breath or dizzy or have chest pain, stop, sit down, and rest.   Heart-healthy lifestyle    Do not smoke. Smoking can make a heart condition worse. If you need help quitting, talk to your doctor about stop-smoking programs and medicines.  These can increase your chances of quitting for good. Quitting smoking may be the most important step you can take to protect your heart. Stay at a healthy weight. Lose weight if you need to. Manage other health problems such as diabetes and high blood pressure. Limit or avoid alcohol. Ask your doctor how much alcohol, if any, is safe for you. If you think you may have a problem with alcohol or drug use, talk to your doctor. Avoid infections such as COVID-19, colds, and the flu. Get the flu vaccine every year. Get a pneumococcal vaccine shot. If you have had one before, ask your doctor whether you need another dose. Stay up to date on your COVID-19 vaccines. When should you call for help? Call 911  if you have symptoms of sudden heart failure such as: You have severe trouble breathing. You cough up pink, foamy mucus. You have a new irregular or rapid heartbeat. Call your doctor now or seek immediate medical care if:    You have new or increased shortness of breath. You are dizzy or lightheaded, or you feel like you may faint. You have sudden weight gain, such as more than 2 to 3 pounds in a day or 5 pounds in a week. (Your doctor may suggest a different range of weight gain.)     You have increased swelling in your legs, ankles, or feet. You are suddenly so tired or weak that you cannot do your usual activities. Watch closely for changes in your health, and be sure to contact your doctor if you develop new symptoms. Where can you learn more? Go to http://www.woods.com/ and enter E597 to learn more about \"Heart Failure: Care Instructions. \"  Current as of: September 7, 2022               Content Version: 13.5  © 4313-5727 [employer]wise, Incorporated. Care instructions adapted under license by [facility] [employer] [facility] ([facility]).  If you have questions about a medical condition or this instruction, always ask your healthcare professional. Natalie Charlton disclaims any warranty or liability for your use of this information. Please visit www.cardiosmart. org for more information regarding cardiovascular disease prevention and treatment.

## 2023-01-11 ENCOUNTER — HOSPITAL ENCOUNTER (OUTPATIENT)
Dept: CARDIAC REHAB | Age: 78
Setting detail: RECURRING SERIES
Discharge: HOME | End: 2023-01-14

## 2023-01-11 ASSESSMENT — PATIENT HEALTH QUESTIONNAIRE - PHQ9
4. FEELING TIRED OR HAVING LITTLE ENERGY: 3
6. FEELING BAD ABOUT YOURSELF - OR THAT YOU ARE A FAILURE OR HAVE LET YOURSELF OR YOUR FAMILY DOWN: 0
9. THOUGHTS THAT YOU WOULD BE BETTER OFF DEAD, OR OF HURTING YOURSELF: 0
SUM OF ALL RESPONSES TO PHQ QUESTIONS 1-9: 6
5. POOR APPETITE OR OVEREATING: 0
1. LITTLE INTEREST OR PLEASURE IN DOING THINGS: 1
8. MOVING OR SPEAKING SO SLOWLY THAT OTHER PEOPLE COULD HAVE NOTICED. OR THE OPPOSITE, BEING SO FIGETY OR RESTLESS THAT YOU HAVE BEEN MOVING AROUND A LOT MORE THAN USUAL: 0
2. FEELING DOWN, DEPRESSED OR HOPELESS: 0
10. IF YOU CHECKED OFF ANY PROBLEMS, HOW DIFFICULT HAVE THESE PROBLEMS MADE IT FOR YOU TO DO YOUR WORK, TAKE CARE OF THINGS AT HOME, OR GET ALONG WITH OTHER PEOPLE: 0
7. TROUBLE CONCENTRATING ON THINGS, SUCH AS READING THE NEWSPAPER OR WATCHING TELEVISION: 1
SUM OF ALL RESPONSES TO PHQ QUESTIONS 1-9: 6
SUM OF ALL RESPONSES TO PHQ QUESTIONS 1-9: 6
3. TROUBLE FALLING OR STAYING ASLEEP: 1
SUM OF ALL RESPONSES TO PHQ9 QUESTIONS 1 & 2: 1
SUM OF ALL RESPONSES TO PHQ QUESTIONS 1-9: 6

## 2023-01-11 ASSESSMENT — LIFESTYLE VARIABLES: SMOKELESS_TOBACCO: NO

## 2023-01-11 ASSESSMENT — EJECTION FRACTION: EF_VALUE: 30

## 2023-01-11 NOTE — CARDIO/PULMONARY
Dear Dr. Pj Jewell: Thank you for referring your patient, Devin Lambert (: 1945), to the Cardiopulmonary Rehabilitation Program at [facility].  Mr. Martine Wallace is a good candidate for the Cardiac Rehab Program and should see improvements with regular participation. We will be addressing appropriate interventions for modifiable risk factors with your patient during the next 12 weeks. We will contact you with any issues or concerns that may arise, or you can follow your patients progress through [address] [place] [address] at any time. A final summary will be available on [facility] when the program is completed. Again, thank you for your referral. If we can be of further assistance, please feel free to contact the Cardiopulmonary Rehab staff at [phone].     Sincerely,    CRISTINA Teran, RN  Cardiopulmonary Rehabilitation Nurse  [facility]

## 2023-01-17 ENCOUNTER — HOSPITAL ENCOUNTER (OUTPATIENT)
Dept: CARDIAC REHAB | Age: 78
Setting detail: RECURRING SERIES
Discharge: HOME | End: 2023-01-20
Payer: MEDICARE

## 2023-01-17 VITALS — WEIGHT: 149.2 LBS | OXYGEN SATURATION: 97 % | BODY MASS INDEX: 21.36 KG/M2 | HEIGHT: 70 IN

## 2023-01-17 PROCEDURE — 93798 PHYS/QHP OP CAR RHAB W/ECG: CPT

## 2023-01-17 ASSESSMENT — EXERCISE STRESS TEST
PEAK_RPE: 11
PEAK_BP: 120/72
PEAK_HR: 105
PEAK_METS: 2.2
PEAK_HR: 83

## 2023-01-17 ASSESSMENT — LIFESTYLE VARIABLES: SMOKELESS_TOBACCO: NO

## 2023-01-17 ASSESSMENT — NEW YORK HEART ASSOCIATION (NYHA) CLASSIFICATION: NYHA FUNCTIONAL CLASS: CLASS III

## 2023-01-17 ASSESSMENT — EJECTION FRACTION
EF_VALUE: 30
EF_VALUE: 30

## 2023-01-20 ENCOUNTER — HOSPITAL ENCOUNTER (OUTPATIENT)
Dept: CARDIAC REHAB | Age: 78
Setting detail: RECURRING SERIES
Discharge: HOME | End: 2023-01-23
Payer: MEDICARE

## 2023-01-20 PROCEDURE — 93798 PHYS/QHP OP CAR RHAB W/ECG: CPT

## 2023-01-20 ASSESSMENT — EXERCISE STRESS TEST
PEAK_BP: 118/80
PEAK_HR: 119
PEAK_METS: 2.2

## 2023-01-23 ENCOUNTER — HOSPITAL ENCOUNTER (OUTPATIENT)
Dept: CARDIAC REHAB | Age: 78
Setting detail: RECURRING SERIES
Discharge: HOME | End: 2023-01-26
Payer: MEDICARE

## 2023-01-23 ENCOUNTER — NURSE ONLY (OUTPATIENT)
Dept: CARDIOLOGY CLINIC | Age: 78
End: 2023-01-23

## 2023-01-23 ENCOUNTER — TELEPHONE (OUTPATIENT)
Dept: CARDIOLOGY CLINIC | Age: 78
End: 2023-01-23

## 2023-01-23 DIAGNOSIS — I25.5 ISCHEMIC CARDIOMYOPATHY: ICD-10-CM

## 2023-01-23 DIAGNOSIS — Z95.810 BIVENTRICULAR AUTOMATIC IMPLANTABLE CARDIOVERTER DEFIBRILLATOR IN SITU: ICD-10-CM

## 2023-01-23 PROCEDURE — 93798 PHYS/QHP OP CAR RHAB W/ECG: CPT

## 2023-01-23 RX ORDER — SACUBITRIL AND VALSARTAN 24; 26 MG/1; MG/1
1 TABLET, FILM COATED ORAL 2 TIMES DAILY
Qty: 180 TABLET | Refills: 3 | Status: SHIPPED | OUTPATIENT
Start: 2023-01-23

## 2023-01-23 ASSESSMENT — EXERCISE STRESS TEST
PEAK_HR: 115
PEAK_METS: 2.2
PEAK_BP: 116/70

## 2023-01-23 NOTE — PROGRESS NOTES
Patient came in to ask questions about Entresto patient assistance. Questions answered.  Paperwork filled out and faxed to [employer]

## 2023-01-27 ENCOUNTER — HOSPITAL ENCOUNTER (OUTPATIENT)
Dept: CARDIAC REHAB | Age: 78
Setting detail: RECURRING SERIES
Discharge: HOME | End: 2023-01-30
Payer: MEDICARE

## 2023-01-27 PROCEDURE — 93798 PHYS/QHP OP CAR RHAB W/ECG: CPT

## 2023-01-27 ASSESSMENT — EXERCISE STRESS TEST
PEAK_HR: 103
PEAK_METS: 2.5

## 2023-01-30 ENCOUNTER — HOSPITAL ENCOUNTER (OUTPATIENT)
Dept: CARDIAC REHAB | Age: 78
Setting detail: RECURRING SERIES
Discharge: HOME | End: 2023-02-02
Payer: MEDICARE

## 2023-01-30 PROCEDURE — 93798 PHYS/QHP OP CAR RHAB W/ECG: CPT

## 2023-01-30 ASSESSMENT — EXERCISE STRESS TEST
PEAK_HR: 110
PEAK_METS: 2.3
PEAK_BP: 110/60

## 2023-02-03 ENCOUNTER — HOSPITAL ENCOUNTER (OUTPATIENT)
Dept: CARDIAC REHAB | Age: 78
Setting detail: RECURRING SERIES
Discharge: HOME | End: 2023-02-06
Payer: MEDICARE

## 2023-02-03 VITALS — BODY MASS INDEX: 21.61 KG/M2 | WEIGHT: 150.6 LBS

## 2023-02-03 PROCEDURE — 93798 PHYS/QHP OP CAR RHAB W/ECG: CPT

## 2023-02-03 ASSESSMENT — EXERCISE STRESS TEST
PEAK_HR: 103
PEAK_BP: 116/72
PEAK_METS: 3

## 2023-02-06 ENCOUNTER — HOSPITAL ENCOUNTER (OUTPATIENT)
Dept: CARDIAC REHAB | Age: 78
Setting detail: RECURRING SERIES
Discharge: HOME | End: 2023-02-09
Payer: MEDICARE

## 2023-02-06 PROCEDURE — 93798 PHYS/QHP OP CAR RHAB W/ECG: CPT

## 2023-02-06 ASSESSMENT — EXERCISE STRESS TEST
PEAK_METS: 3.1
PEAK_HR: 117

## 2023-02-10 ENCOUNTER — HOSPITAL ENCOUNTER (OUTPATIENT)
Dept: CARDIAC REHAB | Age: 78
Setting detail: RECURRING SERIES
Discharge: HOME | End: 2023-02-13
Payer: MEDICARE

## 2023-02-10 PROCEDURE — 93798 PHYS/QHP OP CAR RHAB W/ECG: CPT

## 2023-02-10 ASSESSMENT — EXERCISE STRESS TEST
PEAK_HR: 107
PEAK_METS: 3.1

## 2023-02-13 ENCOUNTER — HOSPITAL ENCOUNTER (OUTPATIENT)
Dept: CARDIAC REHAB | Age: 78
Setting detail: RECURRING SERIES
Discharge: HOME | End: 2023-02-16
Payer: MEDICARE

## 2023-02-13 VITALS — BODY MASS INDEX: 21.35 KG/M2 | WEIGHT: 148.8 LBS

## 2023-02-13 PROCEDURE — 93798 PHYS/QHP OP CAR RHAB W/ECG: CPT

## 2023-02-13 ASSESSMENT — EXERCISE STRESS TEST
PEAK_HR: 112
PEAK_METS: 3.2

## 2023-02-15 ASSESSMENT — LIFESTYLE VARIABLES: SMOKELESS_TOBACCO: NO

## 2023-02-15 ASSESSMENT — EXERCISE STRESS TEST: PEAK_BP: 116/72

## 2023-02-15 ASSESSMENT — EJECTION FRACTION: EF_VALUE: 30

## 2023-02-17 ENCOUNTER — HOSPITAL ENCOUNTER (OUTPATIENT)
Dept: CARDIAC REHAB | Age: 78
Setting detail: RECURRING SERIES
Discharge: HOME | End: 2023-02-20
Payer: MEDICARE

## 2023-02-17 PROCEDURE — 93798 PHYS/QHP OP CAR RHAB W/ECG: CPT

## 2023-02-17 ASSESSMENT — EXERCISE STRESS TEST
PEAK_BP: 108/80
PEAK_HR: 111
PEAK_METS: 3.2

## 2023-02-20 ENCOUNTER — HOSPITAL ENCOUNTER (OUTPATIENT)
Dept: CARDIAC REHAB | Age: 78
Setting detail: RECURRING SERIES
Discharge: HOME | End: 2023-02-23
Payer: MEDICARE

## 2023-02-20 PROCEDURE — 93798 PHYS/QHP OP CAR RHAB W/ECG: CPT

## 2023-02-20 ASSESSMENT — EXERCISE STRESS TEST
PEAK_METS: 3.2
PEAK_HR: 105

## 2023-02-24 ENCOUNTER — HOSPITAL ENCOUNTER (OUTPATIENT)
Dept: CARDIAC REHAB | Age: 78
Setting detail: RECURRING SERIES
Discharge: HOME | End: 2023-02-27
Payer: MEDICARE

## 2023-02-24 PROCEDURE — 93798 PHYS/QHP OP CAR RHAB W/ECG: CPT

## 2023-02-24 ASSESSMENT — EXERCISE STRESS TEST
PEAK_METS: 3.2
PEAK_HR: 103

## 2023-02-27 ENCOUNTER — HOSPITAL ENCOUNTER (OUTPATIENT)
Dept: CARDIAC REHAB | Age: 78
Setting detail: RECURRING SERIES
Discharge: HOME | End: 2023-03-02
Payer: MEDICARE

## 2023-02-27 PROCEDURE — 93798 PHYS/QHP OP CAR RHAB W/ECG: CPT

## 2023-02-27 ASSESSMENT — EXERCISE STRESS TEST
PEAK_HR: 114
PEAK_METS: 3.2

## 2023-03-03 ENCOUNTER — HOSPITAL ENCOUNTER (OUTPATIENT)
Dept: CARDIAC REHAB | Age: 78
Setting detail: RECURRING SERIES
Discharge: HOME | End: 2023-03-06
Payer: MEDICARE

## 2023-03-03 VITALS — WEIGHT: 148.6 LBS | BODY MASS INDEX: 21.32 KG/M2

## 2023-03-03 PROCEDURE — 93798 PHYS/QHP OP CAR RHAB W/ECG: CPT

## 2023-03-03 ASSESSMENT — EXERCISE STRESS TEST
PEAK_METS: 3.2
PEAK_HR: 111

## 2023-03-06 ENCOUNTER — HOSPITAL ENCOUNTER (OUTPATIENT)
Dept: CARDIAC REHAB | Age: 78
Setting detail: RECURRING SERIES
Discharge: HOME | End: 2023-03-09
Payer: MEDICARE

## 2023-03-06 PROCEDURE — 93798 PHYS/QHP OP CAR RHAB W/ECG: CPT

## 2023-03-06 ASSESSMENT — EXERCISE STRESS TEST
PEAK_METS: 3.2
PEAK_HR: 120
PEAK_BP: 110/78

## 2023-03-10 ENCOUNTER — APPOINTMENT (OUTPATIENT)
Dept: CARDIAC REHAB | Age: 78
End: 2023-03-10
Payer: MEDICARE

## 2023-03-13 ENCOUNTER — HOSPITAL ENCOUNTER (OUTPATIENT)
Dept: CARDIAC REHAB | Age: 78
Setting detail: RECURRING SERIES
Discharge: HOME | End: 2023-03-16
Payer: MEDICARE

## 2023-03-13 PROCEDURE — 93798 PHYS/QHP OP CAR RHAB W/ECG: CPT

## 2023-03-13 ASSESSMENT — EXERCISE STRESS TEST
PEAK_METS: 3.2
PEAK_HR: 104
PEAK_BP: 112/70

## 2023-03-14 ENCOUNTER — CLINICAL DOCUMENTATION (OUTPATIENT)
Dept: CARDIOLOGY CLINIC | Age: 78
End: 2023-03-14

## 2023-03-14 NOTE — PROGRESS NOTES
Entresto Patient Assistance approval letter received. Entresto to be sent to the patient on behalf of [facility] until 12/31/2023.

## 2023-03-28 NOTE — PROGRESS NOTES
[facility] CARDIOLOGY  [address], [address]  PHONE: [phone]      23    NAME:  Rufus Patel  : 1945  MRN: [id number]         SUBJECTIVE:   Rufus Patel is a 65 y.o. male seen for a follow up visit regarding the following:     Chief Complaint   Patient presents with    Congestive Heart Failure    3 Month Follow-Up              HPI:  Follow up  Congestive Heart Failure and 3 Month Follow-Up   . Follow up CAD/CABG with ischemic CM, biventricular ICD, hyperlipidemia, hypertension, orthostatic hypotension. Rheumatoid arthritis. Offered to stop spironolactone last visit with fatigue, patient declined. Declined SGLT2i therapy d/t cost.  He did qualify for patient assistance for Entresto. Denies CP, chronic dyspnea is worse. He attended 16 sessions of cardiac rehab, missed some d/t a URI. He says he feels he needs to stop cardiac rehab because his shortness of breath is so bad he can't walk the track. PCP has arranged pulmonary evaluation. Past cardiac history:   KEVIN - on biPAP   Dec 2014 - LIMA to LAD, SVG to Dx, OM and PDA, EF 50%   Oct 2015 - EF 40-45%, normal valves, RVSP - highest tolerated doses beta blocker, ARB   2016 - EF 40-45%, RVSP 30, mildly dilated RV   Stress MPI with Lexiscan - inferior scar with jorge infarct ischemia, complicated by subdiaphragmatic artifact, EF 48%,   I personally reviewed images, SDS 2, significant subdiaphragmatic artifact , EF unchanged from baseline.      May 2018 - atretic LIMA to patent prox to mid LAD, 95% apical stenosis, patent SVG to OM, Dx and PDA   2018 - EF 25-30%, moderate MR, RVSP 27 (EF down from 40-45%)   Aug 2018 - [facility] biventricular ICD   2019- 30-35% mild MR RVSP 2021- EF 30-35%, mild MR/TR, RVE with diminished RV function, RVSP 39  2022        EF 30-35%, ascending aorta 4.2 cm, mild/mod MR        NST - fixed inferior defect with small area of reversibility at

## 2023-03-29 ENCOUNTER — OFFICE VISIT (OUTPATIENT)
Dept: CARDIOLOGY CLINIC | Age: 78
End: 2023-03-29
Payer: MEDICARE

## 2023-03-29 VITALS
HEART RATE: 62 BPM | WEIGHT: 148.2 LBS | DIASTOLIC BLOOD PRESSURE: 82 MMHG | BODY MASS INDEX: 21.22 KG/M2 | SYSTOLIC BLOOD PRESSURE: 118 MMHG | HEIGHT: 70 IN

## 2023-03-29 DIAGNOSIS — I77.89 ENLARGED THORACIC AORTA (HCC): ICD-10-CM

## 2023-03-29 DIAGNOSIS — Z95.810 BIVENTRICULAR AUTOMATIC IMPLANTABLE CARDIOVERTER DEFIBRILLATOR IN SITU: ICD-10-CM

## 2023-03-29 DIAGNOSIS — I20.0 ACCELERATING ANGINA (HCC): ICD-10-CM

## 2023-03-29 DIAGNOSIS — I25.5 ISCHEMIC CARDIOMYOPATHY: Primary | ICD-10-CM

## 2023-03-29 DIAGNOSIS — I10 ESSENTIAL HYPERTENSION: ICD-10-CM

## 2023-03-29 DIAGNOSIS — E78.5 DYSLIPIDEMIA: ICD-10-CM

## 2023-03-29 DIAGNOSIS — I50.22 CHRONIC HFREF (HEART FAILURE WITH REDUCED EJECTION FRACTION) (HCC): ICD-10-CM

## 2023-03-29 DIAGNOSIS — I25.118 CORONARY ARTERY DISEASE OF NATIVE ARTERY OF NATIVE HEART WITH STABLE ANGINA PECTORIS (HCC): ICD-10-CM

## 2023-03-29 DIAGNOSIS — Z95.1 S/P CABG (CORONARY ARTERY BYPASS GRAFT): ICD-10-CM

## 2023-03-29 PROCEDURE — G8484 FLU IMMUNIZE NO ADMIN: HCPCS | Performed by: INTERNAL MEDICINE

## 2023-03-29 PROCEDURE — 3079F DIAST BP 80-89 MM HG: CPT | Performed by: INTERNAL MEDICINE

## 2023-03-29 PROCEDURE — 3074F SYST BP LT 130 MM HG: CPT | Performed by: INTERNAL MEDICINE

## 2023-03-29 PROCEDURE — 1123F ACP DISCUSS/DSCN MKR DOCD: CPT | Performed by: INTERNAL MEDICINE

## 2023-03-29 PROCEDURE — 99214 OFFICE O/P EST MOD 30 MIN: CPT | Performed by: INTERNAL MEDICINE

## 2023-03-29 PROCEDURE — 1036F TOBACCO NON-USER: CPT | Performed by: INTERNAL MEDICINE

## 2023-03-29 PROCEDURE — G8427 DOCREV CUR MEDS BY ELIG CLIN: HCPCS | Performed by: INTERNAL MEDICINE

## 2023-03-29 PROCEDURE — G8420 CALC BMI NORM PARAMETERS: HCPCS | Performed by: INTERNAL MEDICINE

## 2023-03-29 ASSESSMENT — ENCOUNTER SYMPTOMS: SHORTNESS OF BREATH: 1

## 2023-03-30 DIAGNOSIS — I50.22 CHRONIC HFREF (HEART FAILURE WITH REDUCED EJECTION FRACTION) (HCC): ICD-10-CM

## 2023-03-30 DIAGNOSIS — I20.0 ACCELERATING ANGINA (HCC): ICD-10-CM

## 2023-03-30 LAB
ANION GAP SERPL CALC-SCNC: 5 MMOL/L (ref 2–11)
BUN SERPL-MCNC: 23 MG/DL (ref 8–23)
CALCIUM SERPL-MCNC: 9.5 MG/DL (ref 8.3–10.4)
CHLORIDE SERPL-SCNC: 107 MMOL/L (ref 101–110)
CO2 SERPL-SCNC: 27 MMOL/L (ref 21–32)
CREAT SERPL-MCNC: 1.4 MG/DL (ref 0.8–1.5)
ERYTHROCYTE [DISTWIDTH] IN BLOOD BY AUTOMATED COUNT: 12.8 % (ref 11.9–14.6)
GLUCOSE SERPL-MCNC: 95 MG/DL (ref 65–100)
HCT VFR BLD AUTO: 42.2 % (ref 41.1–50.3)
HGB BLD-MCNC: 13.5 G/DL (ref 13.6–17.2)
MCH RBC QN AUTO: 31.6 PG (ref 26.1–32.9)
MCHC RBC AUTO-ENTMCNC: 32 G/DL (ref 31.4–35)
MCV RBC AUTO: 98.8 FL (ref 82–102)
NRBC # BLD: 0 K/UL (ref 0–0.2)
PLATELET # BLD AUTO: 222 K/UL (ref 150–450)
PMV BLD AUTO: 8.9 FL (ref 9.4–12.3)
POTASSIUM SERPL-SCNC: 4.8 MMOL/L (ref 3.5–5.1)
RBC # BLD AUTO: 4.27 M/UL (ref 4.23–5.6)
SODIUM SERPL-SCNC: 139 MMOL/L (ref 133–143)
WBC # BLD AUTO: 5.7 K/UL (ref 4.3–11.1)

## 2023-03-31 ENCOUNTER — APPOINTMENT (OUTPATIENT)
Dept: CARDIAC REHAB | Age: 78
End: 2023-03-31
Payer: MEDICARE

## 2023-04-03 DIAGNOSIS — I25.5 ISCHEMIC CARDIOMYOPATHY: ICD-10-CM

## 2023-04-03 DIAGNOSIS — Z95.810 BIVENTRICULAR AUTOMATIC IMPLANTABLE CARDIOVERTER DEFIBRILLATOR IN SITU: ICD-10-CM

## 2023-04-04 NOTE — PROGRESS NOTES
Patient pre-assessment complete for [facility] scheduled for 930, arrival time 0730. Patient verified using . Patient instructed to bring all home medications in labeled bottles on the day of procedure. NPO status reinforced. Patient instructed to HOLD aldactone. Instructed they can take all other medications excluding vitamins & supplements. Patient verbalizes understanding of all instructions & denies any questions at this time.

## 2023-04-05 ENCOUNTER — HOSPITAL ENCOUNTER (OUTPATIENT)
Age: 78
Setting detail: OUTPATIENT SURGERY
Discharge: HOME | End: 2023-04-05
Attending: INTERNAL MEDICINE | Admitting: INTERNAL MEDICINE
Payer: MEDICARE

## 2023-04-05 VITALS
BODY MASS INDEX: 21.19 KG/M2 | RESPIRATION RATE: 17 BRPM | WEIGHT: 148 LBS | DIASTOLIC BLOOD PRESSURE: 70 MMHG | HEIGHT: 70 IN | OXYGEN SATURATION: 99 % | TEMPERATURE: 98 F | HEART RATE: 75 BPM | SYSTOLIC BLOOD PRESSURE: 97 MMHG

## 2023-04-05 DIAGNOSIS — I20.0 ACCELERATING ANGINA (HCC): ICD-10-CM

## 2023-04-05 LAB
ECHO BSA: 1.82 M2
EKG ATRIAL RATE: 81 BPM
EKG DIAGNOSIS: NORMAL
EKG Q-T INTERVAL: 400 MS
EKG QRS DURATION: 124 MS
EKG QTC CALCULATION (BAZETT): 446 MS
EKG R AXIS: 263 DEGREES
EKG T AXIS: 41 DEGREES
EKG VENTRICULAR RATE: 75 BPM

## 2023-04-05 PROCEDURE — 6360000002 HC RX W HCPCS: Performed by: INTERNAL MEDICINE

## 2023-04-05 PROCEDURE — 6360000004 HC RX CONTRAST MEDICATION: Performed by: INTERNAL MEDICINE

## 2023-04-05 PROCEDURE — 93459 L HRT ART/GRFT ANGIO: CPT | Performed by: INTERNAL MEDICINE

## 2023-04-05 PROCEDURE — C1760 CLOSURE DEV, VASC: HCPCS | Performed by: INTERNAL MEDICINE

## 2023-04-05 PROCEDURE — 93005 ELECTROCARDIOGRAM TRACING: CPT | Performed by: INTERNAL MEDICINE

## 2023-04-05 PROCEDURE — 2500000003 HC RX 250 WO HCPCS: Performed by: INTERNAL MEDICINE

## 2023-04-05 PROCEDURE — 2580000003 HC RX 258: Performed by: INTERNAL MEDICINE

## 2023-04-05 PROCEDURE — 2709999900 HC NON-CHARGEABLE SUPPLY: Performed by: INTERNAL MEDICINE

## 2023-04-05 PROCEDURE — C1894 INTRO/SHEATH, NON-LASER: HCPCS | Performed by: INTERNAL MEDICINE

## 2023-04-05 PROCEDURE — 99153 MOD SED SAME PHYS/QHP EA: CPT | Performed by: INTERNAL MEDICINE

## 2023-04-05 PROCEDURE — 6370000000 HC RX 637 (ALT 250 FOR IP): Performed by: INTERNAL MEDICINE

## 2023-04-05 PROCEDURE — 99152 MOD SED SAME PHYS/QHP 5/>YRS: CPT | Performed by: INTERNAL MEDICINE

## 2023-04-05 RX ORDER — ASPIRIN 81 MG/1
324 TABLET, CHEWABLE ORAL DAILY
Status: DISCONTINUED | OUTPATIENT
Start: 2023-04-05 | End: 2023-04-05 | Stop reason: HOSPADM

## 2023-04-05 RX ORDER — HEPARIN SODIUM 200 [USP'U]/100ML
INJECTION, SOLUTION INTRAVENOUS CONTINUOUS PRN
Status: DISCONTINUED | OUTPATIENT
Start: 2023-04-05 | End: 2023-04-05 | Stop reason: HOSPADM

## 2023-04-05 RX ORDER — MIDAZOLAM HYDROCHLORIDE 1 MG/ML
INJECTION INTRAMUSCULAR; INTRAVENOUS PRN
Status: DISCONTINUED | OUTPATIENT
Start: 2023-04-05 | End: 2023-04-05 | Stop reason: HOSPADM

## 2023-04-05 RX ORDER — LIDOCAINE HYDROCHLORIDE 10 MG/ML
INJECTION, SOLUTION INFILTRATION; PERINEURAL PRN
Status: DISCONTINUED | OUTPATIENT
Start: 2023-04-05 | End: 2023-04-05 | Stop reason: HOSPADM

## 2023-04-05 RX ORDER — TRAMADOL HYDROCHLORIDE 50 MG/1
50 TABLET ORAL ONCE
Status: COMPLETED | OUTPATIENT
Start: 2023-04-05 | End: 2023-04-05

## 2023-04-05 RX ORDER — SODIUM CHLORIDE 9 MG/ML
INJECTION, SOLUTION INTRAVENOUS CONTINUOUS
Status: DISCONTINUED | OUTPATIENT
Start: 2023-04-05 | End: 2023-04-05 | Stop reason: HOSPADM

## 2023-04-05 RX ADMIN — SODIUM CHLORIDE: 9 INJECTION, SOLUTION INTRAVENOUS at 07:57

## 2023-04-05 RX ADMIN — TRAMADOL HYDROCHLORIDE 50 MG: 50 TABLET ORAL at 10:31

## 2023-04-05 NOTE — Clinical Note
Vessel: right femoral artery. Closed using: manual pressure. Site secured by Tegaderm. Manual pressure held for: 15 minutes.

## 2023-04-05 NOTE — PROGRESS NOTES
TRANSFER - IN REPORT:    Verbal report received from [facility] on Lenor Luster  being received from cath lab for routine progression of patient care      Report consisted of patient's Situation, Background, Assessment and   Recommendations(SBAR). Information from the following report(s) Nurse Handoff Report was reviewed with the receiving nurse. Opportunity for questions and clarification was provided. Assessment completed upon patient's arrival to unit and care assumed.

## 2023-04-05 NOTE — PROGRESS NOTES
Patient received to [address] [place] [address] room # 17  Ambulatory from [facility]. Patient scheduled for [facility] today with Dr Gonzalo Flores. Procedure reviewed & questions answered, voiced good understanding consent obtained & placed on chart. All medications and medical history reviewed. Will prep patient per orders. Patient & family updated on plan of care. The patient has a fraility score of 3-MANAGING WELL, based on ambulation without assistance.

## 2023-04-05 NOTE — H&P
- EF 25-30%, moderate MR, RVSP 27 (EF down from 40-45%)   Aug 2018 - [facility] biventricular ICD   Apr 2019- 30-35% mild MR RVSP 23   Apr 2021- EF 30-35%, mild MR/TR, RVE with diminished RV function, RVSP 39  Nov 2022        EF 30-35%, ascending aorta 4.2 cm, mild/mod MR        NST - fixed inferior defect with small area of reversibility at the apex              Key CAD CHF Meds               sacubitril-valsartan (ENTRESTO) 24-26 MG per tablet (Taking)     Sig - Route: Take 1 tablet by mouth 2 times daily - Oral     Class: Print     spironolactone (ALDACTONE) 25 MG tablet (Taking)     Sig: TAKE 1/2 TABLET EVERY DAY     metoprolol tartrate (LOPRESSOR) 25 MG tablet (Taking)     Sig - Route: Take 1 tablet by mouth 2 times daily - Oral     atorvastatin (LIPITOR) 40 MG tablet (Taking)     Sig - Route: Take 1 tablet by mouth daily - Oral             Key Antihyperglycemic Medications         Patient is on no antihyperglycemic meds. Past Medical History, Past Surgical History, Family history, Social History, and Medications were all reviewed with the patient today and updated as necessary. Home Medications           Prior to Admission medications    Medication Sig Start Date End Date Taking?  Authorizing Provider   sacubitril-valsartan (ENTRESTO) 24-26 MG per tablet Take 1 tablet by mouth 2 times daily 1/23/23   Yes Susan Stuart MD   spironolactone (ALDACTONE) 25 MG tablet TAKE 1/2 TABLET EVERY DAY 11/10/22   Yes Susan Stuart MD   metoprolol tartrate (LOPRESSOR) 25 MG tablet Take 1 tablet by mouth 2 times daily 11/10/22   Yes Susan Stuart MD   atorvastatin (LIPITOR) 40 MG tablet Take 1 tablet by mouth daily 11/10/22   Yes Susan Stuart MD   ascorbic acid (VITAMIN C) 500 MG tablet Take by mouth     Yes Ar Automatic Reconciliation   aspirin 81 MG EC tablet Take 81 mg by mouth     Yes Ar Automatic Reconciliation   leflunomide (ARAVA) 20 MG tablet Take 20 mg by mouth

## 2023-04-05 NOTE — PROGRESS NOTES
TRANSFER - OUT REPORT:    Verbal report given to RN on Raymond Garcia  being transferred to [facility] for routine progression of patient care       Report consisted of patient's Situation, Background, Assessment and   Recommendations(SBAR). Information from the following report(s) Nurse Handoff Report and MAR was reviewed with the receiving nurse.     [facility] with Dr. Finley Headings  No interventions  R femoral access   Manual pressure held on femoral post procedure, no s/sx of bleeding  Versed 2mg IV

## 2023-04-05 NOTE — DISCHARGE INSTRUCTIONS
heavy machinery for the remainder of the day     Rest when you feel tired. Getting enough sleep will help you recover. Diet    You can eat your normal diet, unless your doctor gives you other instructions. If your stomach is upset, try clear liquids and bland, low-fat foods like plain toast or rice. Drink plenty of fluids (unless your doctor tells you not to). Don't drink alcohol for 24 hours. Medicines    Be safe with medicines. Read and follow all instructions on the label. If the doctor gave you a prescription medicine for pain, take it as prescribed. If you are not taking a prescription pain medicine, ask your doctor if you can take an over-the-counter medicine. If you think your pain medicine is making you sick to your stomach: Take your medicine after meals (unless your doctor has told you not to). Ask your doctor for a different pain medicine. I have read the above instructions and have had the opportunity to ask questions.       Patient: ________________________   Date: _____________    Witness: _______________________   Date: _____________

## 2023-04-05 NOTE — Clinical Note
Single view of the saphenous vein graft to the right coronary artery obtained using power injection.  occluded

## 2023-04-05 NOTE — Clinical Note
Contrast Dose Calculator:   Patient's age: 65.   Patient's sex: Male. Patient weight (kg) = 67. Creatinine level (mg/dL) = 1.4. Creatinine clearance (mL/min): 41. Contrast concentration (mg/mL) = 370. MACD = 239 mL. Max Contrast dose per Creatinine Cl calculator = 92 mL.

## 2023-04-19 ENCOUNTER — OFFICE VISIT (OUTPATIENT)
Dept: PULMONOLOGY | Age: 78
End: 2023-04-19

## 2023-04-19 ENCOUNTER — HOSPITAL ENCOUNTER (OUTPATIENT)
Dept: GENERAL RADIOLOGY | Age: 78
Discharge: HOME | End: 2023-04-22
Payer: MEDICARE

## 2023-04-19 VITALS
DIASTOLIC BLOOD PRESSURE: 80 MMHG | SYSTOLIC BLOOD PRESSURE: 120 MMHG | BODY MASS INDEX: 21.47 KG/M2 | WEIGHT: 150 LBS | HEART RATE: 80 BPM | OXYGEN SATURATION: 96 % | RESPIRATION RATE: 20 BRPM | TEMPERATURE: 98 F | HEIGHT: 70 IN

## 2023-04-19 DIAGNOSIS — R06.02 SHORTNESS OF BREATH: Primary | ICD-10-CM

## 2023-04-19 DIAGNOSIS — I50.22 CHRONIC HFREF (HEART FAILURE WITH REDUCED EJECTION FRACTION) (HCC): ICD-10-CM

## 2023-04-19 DIAGNOSIS — R09.02 HYPOXIA: ICD-10-CM

## 2023-04-19 DIAGNOSIS — R06.02 SHORTNESS OF BREATH: ICD-10-CM

## 2023-04-19 DIAGNOSIS — R09.89 RALES: ICD-10-CM

## 2023-04-19 PROCEDURE — 71046 X-RAY EXAM CHEST 2 VIEWS: CPT

## 2023-04-19 RX ORDER — OMEPRAZOLE 40 MG/1
40 CAPSULE, DELAYED RELEASE ORAL DAILY
COMMUNITY

## 2023-04-19 NOTE — PROGRESS NOTES
left more  than right, possibly congenital.  2) Additional findings include trace left pleural effusion, evidence of  sternotomy and cardiac pacemaker, moderate size hiatal hernia, moderate  thoracolumbar scoliosis, thickening of the urinary bladder wall and sigmoid  diverticulosis without definite acute inflammatory changes, bilateral L5  spondylolysis and grade 1-2 spondylolisthesis. .    Nuclear Medicine:   NM KIDNEY W FLOW AND FUNCTION W PHARMACOLOGICAL INTERVENTION 10/10/2019    Narrative  NUCLEAR MEDICINE RENOGRAM STUDY. INDICATION: Bilateral hydronephrosis. RADIOPHARMACEUTICAL:  8.1 mCi Tc99m MAG-3 IV. TECHNIQUE: Flow images, renal time activity curves were performed. 40 Lasix  were given IV at 20 minutes. Post diuretic T1/2 and differential uptake  calculated. FINDINGS: There are prompt bilateral nephrograms. There is early activity in the  urinary bladder and both ureters. Differential function is 43% uptake on the left and 57% on the right. Right: There is spontaneous washout from the right kidney and good response to  Lasix. Post Lasix T1/2 is 7 minutes. Left: There is spontaneous washout and good response to Lasix. Post Lasix T1 12  minutes. Impression  IMPRESSION: Decreased function on the left compared to the right, 43% Lt versus  57% Rt. Spontaneous washout from both kidneys with good response to Lasix. No  evidence of functional obstruction on the right. Borderline prolonged post  diuretic T1/2 on the left of 12 minutes. PFTs:   No flowsheet data found. No results found for this or any previous visit. No results found for this or any previous visit. FeNO: No results found for this or any previous visit.   FeNO and Likelihood of Eosinophilic Asthma   Unlikely Intermediate Likely   <25 ppb 25-50 ppb >50ppb     Exercise Oximetry:    4/19/23  Resting RA Sat - 98%  Lowest ambulating Sat - 99%    Echo:   TRANSTHORACIC ECHOCARDIOGRAM (TTE) COMPLETE (CONTRAST/BUBBLE/3D PRN)

## 2023-04-24 ENCOUNTER — NURSE ONLY (OUTPATIENT)
Dept: PULMONOLOGY | Age: 78
End: 2023-04-24
Payer: MEDICARE

## 2023-04-24 DIAGNOSIS — R06.02 SHORTNESS OF BREATH: Primary | ICD-10-CM

## 2023-04-24 LAB
FEV 1 , POC: 3.67 L
FEV1 % PRED, POC: 84 %
FEV1/FVC, POC: NORMAL
FVC % PRED, POC: 89 %
FVC, POC: NORMAL

## 2023-04-24 PROCEDURE — 94729 DIFFUSING CAPACITY: CPT | Performed by: INTERNAL MEDICINE

## 2023-04-24 PROCEDURE — 94060 EVALUATION OF WHEEZING: CPT | Performed by: INTERNAL MEDICINE

## 2023-04-24 PROCEDURE — 94726 PLETHYSMOGRAPHY LUNG VOLUMES: CPT | Performed by: INTERNAL MEDICINE

## 2023-04-24 ASSESSMENT — PULMONARY FUNCTION TESTS
FVC_PERCENT_PREDICTED_POC: 89
FEV1_PERCENT_PREDICTED_POC: 84

## 2023-04-27 ENCOUNTER — HOSPITAL ENCOUNTER (OUTPATIENT)
Dept: CT IMAGING | Age: 78
Discharge: HOME | End: 2023-04-29
Payer: MEDICARE

## 2023-04-27 DIAGNOSIS — R06.02 SHORTNESS OF BREATH: ICD-10-CM

## 2023-04-27 DIAGNOSIS — R09.89 RALES: ICD-10-CM

## 2023-04-27 PROCEDURE — 71250 CT THORAX DX C-: CPT

## 2023-05-01 ENCOUNTER — OFFICE VISIT (OUTPATIENT)
Dept: CARDIOLOGY CLINIC | Age: 78
End: 2023-05-01
Payer: MEDICARE

## 2023-05-01 VITALS
BODY MASS INDEX: 21.05 KG/M2 | SYSTOLIC BLOOD PRESSURE: 112 MMHG | HEART RATE: 76 BPM | HEIGHT: 70 IN | WEIGHT: 147 LBS | DIASTOLIC BLOOD PRESSURE: 64 MMHG

## 2023-05-01 DIAGNOSIS — E78.5 DYSLIPIDEMIA: ICD-10-CM

## 2023-05-01 DIAGNOSIS — N18.31 STAGE 3A CHRONIC KIDNEY DISEASE (CKD) (HCC): ICD-10-CM

## 2023-05-01 DIAGNOSIS — I25.5 ISCHEMIC CARDIOMYOPATHY: Primary | ICD-10-CM

## 2023-05-01 DIAGNOSIS — I25.118 CORONARY ARTERY DISEASE OF NATIVE ARTERY OF NATIVE HEART WITH STABLE ANGINA PECTORIS (HCC): ICD-10-CM

## 2023-05-01 DIAGNOSIS — Z95.1 S/P CABG (CORONARY ARTERY BYPASS GRAFT): ICD-10-CM

## 2023-05-01 DIAGNOSIS — I50.22 CHRONIC HFREF (HEART FAILURE WITH REDUCED EJECTION FRACTION) (HCC): ICD-10-CM

## 2023-05-01 PROCEDURE — 1123F ACP DISCUSS/DSCN MKR DOCD: CPT | Performed by: INTERNAL MEDICINE

## 2023-05-01 PROCEDURE — 99214 OFFICE O/P EST MOD 30 MIN: CPT | Performed by: INTERNAL MEDICINE

## 2023-05-01 PROCEDURE — 3074F SYST BP LT 130 MM HG: CPT | Performed by: INTERNAL MEDICINE

## 2023-05-01 PROCEDURE — G8420 CALC BMI NORM PARAMETERS: HCPCS | Performed by: INTERNAL MEDICINE

## 2023-05-01 PROCEDURE — G8427 DOCREV CUR MEDS BY ELIG CLIN: HCPCS | Performed by: INTERNAL MEDICINE

## 2023-05-01 PROCEDURE — 3078F DIAST BP <80 MM HG: CPT | Performed by: INTERNAL MEDICINE

## 2023-05-01 PROCEDURE — 1036F TOBACCO NON-USER: CPT | Performed by: INTERNAL MEDICINE

## 2023-05-01 RX ORDER — EZETIMIBE 10 MG/1
10 TABLET ORAL DAILY
Qty: 90 TABLET | Refills: 3 | Status: SHIPPED | OUTPATIENT
Start: 2023-05-01

## 2023-05-01 ASSESSMENT — ENCOUNTER SYMPTOMS: SHORTNESS OF BREATH: 1

## 2023-05-01 NOTE — PATIENT INSTRUCTIONS
Patient Education        Heart Failure: Care Instructions  Overview     Heart failure occurs when your heart does not pump as much blood as the body needs. Failure does not mean that the heart has stopped pumping but rather that it is not pumping as well as it should. Over time, this causes fluid buildup in your lungs and other parts of your body. Fluid buildup can cause shortness of breath, fatigue, swollen ankles, and other problems. Heart failure is treated with medicines, a heart-healthy lifestyle, and the steps you take to check your symptoms. Treatment can slow the disease, help you feel better, and help keep you out of the hospital. Treatment may also help you live longer. Follow-up care is a key part of your treatment and safety. Be sure to make and go to all appointments, and call your doctor if you are having problems. It's also a good idea to know your test results and keep a list of the medicines you take. How can you care for yourself at home? Medicines    Be safe with medicines. Take your medicines exactly as prescribed. Call your doctor if you think you are having a problem with your medicine. You will get more details on the specific medicines your doctor prescribes. Medicines can help your heart work better, help you feel better, and help keep you out of the hospital. Medicines may also help you live longer. Talk with your doctor or pharmacist before you take a new prescription or over-the-counter medicine. Ask if the medicine is safe for you to take. Some medicines can affect your heart and make heart failure worse. Others may keep your heart failure medicines from working right. Over-the-counter medicines that you may need to avoid include herbal supplements, vitamins, pain relievers called NSAIDs, antacids, laxatives, and cough, cold, flu, or sinus medicine. Diet    Eat heart-healthy foods. These foods include vegetables, fruits, nuts, beans, lean meat, fish, and whole grains.

## 2023-05-15 ENCOUNTER — TELEPHONE (OUTPATIENT)
Dept: PULMONOLOGY | Age: 78
End: 2023-05-15

## 2023-05-15 DIAGNOSIS — N28.89 DILATION OF KIDNEY: Primary | ICD-10-CM

## 2023-05-15 NOTE — TELEPHONE ENCOUNTER
----- Message from Yumiko Sanchez MD sent at 5/12/2023  9:06 AM EDT -----  Can you let the patient know that his CT scan and breathing tests both showed some mild fibrosis of his lungs. When mild, often times we just monitor this, but if it shows signs of progression there may be medication we would want to start him on. We will discuss this in more detail at his next appointment. In addition, the CT chest showed a very dilated left kidney. There may be an obstruction on that side and we need him to see Urology to have this evaluated. Can we refer him? Thanks.    Yumiko Sanchez MD

## 2023-05-15 NOTE — TELEPHONE ENCOUNTER
Spoke with the patient in regards to their CT scan and breathing test results, explained per Dr. Ramiro Ryan that the CT and breathing tests both showed some mild fibrosis of his lungs. I also explained that when it's often mild that they will just monitor this and if it shows signs of progression there starla be medication that Dr. Ramiro Ryan would like to start him on. I also told the patient that Dr. Ramiro Ryan saw a very dilated left kidney and there may be an obstruction on that side and would like to refer him to Urology. Patient was agreeable with the referral and a order has been established. Patient stated concerns about suffering from shortness of breath and wanted to know if there is any medication that Dr. Ramiro Ryan recommends that he should take to help him with his symptoms. Dr. Ramiro Ryan, can you please advise if there is any medication you would like the patient to try for right now? Thank you so much!  // Cesar ROLDAN

## 2023-05-16 ENCOUNTER — TELEPHONE (OUTPATIENT)
Age: 78
End: 2023-05-16

## 2023-05-16 NOTE — TELEPHONE ENCOUNTER
Can you let the patient know that unfortunately between his heart failure and his pulmonary fibrosis there is not an easy medication to help him with shortness of breath. He may benefit from pulmonary rehab which is an exercise program to help build up his exercise tolerance. If he is open to this can we go ahead and refer him. He did not qualify for O2 at his last appointment's walk test. We will continue to monitor this at his next follow up appointment.      Geo Lopez MD

## 2023-05-17 NOTE — TELEPHONE ENCOUNTER
Spoke with the patient and notified him per Dr. Melisa Ovalle that unfortunately between his heart failure and his pulmonary fibrosis there is not an easy medication to help him with shortness of breath. I also explained that Dr. Melisa Ovalle feels that he may benefit from pulmonary rehab which is an exercise program to help build up his exercise tolerance. Patient stated he has tried it before and that it did not help and he believes that this actually made him worse. I explained to the patient that since he did not qualify for oxygen at the last visit that Dr. Melisa Ovalle will continue to monitor this at his next appointment. Patient stated frustrations that his shortness of breath is not getting any better and that there isn't a medication he can take to help with relieving his symptoms. I confirmed the date of his appointment with Tere Ritchie in July and confirmed that we sent the referral to Urology so they can further evaluate his dilated Kidney. I also provided the patient with the number to Dr. Zach Osorio office so he can call them to get the appointment scheduled. Patient did not have any further questions or concerns at this time.  // Gillian Brothers

## 2023-06-01 ENCOUNTER — OFFICE VISIT (OUTPATIENT)
Dept: UROLOGY | Age: 78
End: 2023-06-01
Payer: MEDICARE

## 2023-06-01 DIAGNOSIS — N13.30 HYDRONEPHROSIS, UNSPECIFIED HYDRONEPHROSIS TYPE: Primary | ICD-10-CM

## 2023-06-01 DIAGNOSIS — N18.31 STAGE 3A CHRONIC KIDNEY DISEASE (CKD) (HCC): ICD-10-CM

## 2023-06-01 DIAGNOSIS — Q62.39 UPJ OBSTRUCTION, CONGENITAL: ICD-10-CM

## 2023-06-01 LAB
BILIRUBIN, URINE, POC: NEGATIVE
BLOOD URINE, POC: NEGATIVE
GLUCOSE URINE, POC: NEGATIVE
KETONES, URINE, POC: NEGATIVE
LEUKOCYTE ESTERASE, URINE, POC: NEGATIVE
NITRITE, URINE, POC: NEGATIVE
PH, URINE, POC: 6 (ref 4.6–8)
PROTEIN,URINE, POC: NEGATIVE
SPECIFIC GRAVITY, URINE, POC: 1.01 (ref 1–1.03)
URINALYSIS CLARITY, POC: NORMAL
URINALYSIS COLOR, POC: NORMAL
UROBILINOGEN, POC: NORMAL

## 2023-06-01 PROCEDURE — 1123F ACP DISCUSS/DSCN MKR DOCD: CPT | Performed by: NURSE PRACTITIONER

## 2023-06-01 PROCEDURE — 81003 URINALYSIS AUTO W/O SCOPE: CPT | Performed by: NURSE PRACTITIONER

## 2023-06-01 PROCEDURE — G8428 CUR MEDS NOT DOCUMENT: HCPCS | Performed by: NURSE PRACTITIONER

## 2023-06-01 PROCEDURE — 99204 OFFICE O/P NEW MOD 45 MIN: CPT | Performed by: NURSE PRACTITIONER

## 2023-06-01 PROCEDURE — G8420 CALC BMI NORM PARAMETERS: HCPCS | Performed by: NURSE PRACTITIONER

## 2023-06-01 PROCEDURE — 1036F TOBACCO NON-USER: CPT | Performed by: NURSE PRACTITIONER

## 2023-06-01 ASSESSMENT — ENCOUNTER SYMPTOMS
DIARRHEA: 0
COUGH: 0
HEARTBURN: 1
NAUSEA: 0
EYE DISCHARGE: 0
CONSTIPATION: 0
INDIGESTION: 1
SKIN LESIONS: 0
ABDOMINAL PAIN: 1
EYE PAIN: 0
BLOOD IN STOOL: 0
BACK PAIN: 1
SHORTNESS OF BREATH: 1
VOMITING: 0
WHEEZING: 0

## 2023-06-01 NOTE — PROGRESS NOTES
[address] [place] [address] [address]  [phone]          Lorenzo Quick  : 1945    Chief Complaint   Patient presents with    New Patient          HPI     Lorenzo Quick is a 65 y.o. male  Recently had work-up due to shortness of breath. Chest CT revealed severe left hydronephrosis. Patient's creatinine is slightly elevated at 1.45. He was referred here for further evaluation. We have seen patient in the past due to probable bilateral UPJ obstructions. Its been over 4 years since his last MAG3 renal scan. He does have pain in the back. The the pain can be bilateral in nature but most days it is worse on the left than it is the right. See previous history as below. Previous urological history as below: copied from previous chart. Initially he was Referred by ER., pt went to ER on 19 wth c/o right abdominal and right flank pain. Had CT A/P w contrast: IMPRESSION:    1) There is perinephric fluid on the right with stranding densities in the  retroperitoneum. Etiology is not clear. 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  2) Additional findings include trace left pleural effusion, evidence of  sternotomy and cardiac pacemaker, moderate size hiatal hernia, moderate  thoracolumbar scoliosis, thickening of the urinary bladder wall and sigmoid  diverticulosis without definite acute inflammatory changes, bilateral L5  spondylolysis and grade 1-2 spondylolisthesis. Marco Clemons He states that I did what sounds like a TURP in  (records not available to me). Hx of CAD, s/p CABG, s/p defibrillator. He states that the pain was located in the RLQ. It went away the same day, but has had it before . no pain now, no hematuria. Creatinine 1.11 on 19. He also occasionally has left flank pain. Looks like bilateral UPJ obstruction, L> R.  Right sided pain and perinephric edema may have been due to ruptured fornix or possibly a

## 2023-06-09 ENCOUNTER — HOSPITAL ENCOUNTER (OUTPATIENT)
Dept: NUCLEAR MEDICINE | Age: 78
End: 2023-06-09
Payer: MEDICARE

## 2023-06-09 DIAGNOSIS — N13.30 HYDRONEPHROSIS, UNSPECIFIED HYDRONEPHROSIS TYPE: ICD-10-CM

## 2023-06-09 PROCEDURE — 3430000000 HC RX DIAGNOSTIC RADIOPHARMACEUTICAL: Performed by: NURSE PRACTITIONER

## 2023-06-09 PROCEDURE — 6360000002 HC RX W HCPCS: Performed by: NURSE PRACTITIONER

## 2023-06-09 PROCEDURE — A9562 TC99M MERTIATIDE: HCPCS | Performed by: NURSE PRACTITIONER

## 2023-06-09 PROCEDURE — 78708 K FLOW/FUNCT IMAGE W/DRUG: CPT

## 2023-06-09 PROCEDURE — 2580000003 HC RX 258: Performed by: NURSE PRACTITIONER

## 2023-06-09 RX ORDER — FUROSEMIDE 10 MG/ML
40 INJECTION INTRAMUSCULAR; INTRAVENOUS ONCE
Status: COMPLETED | OUTPATIENT
Start: 2023-06-09 | End: 2023-06-09

## 2023-06-09 RX ORDER — SODIUM CHLORIDE 0.9 % (FLUSH) 0.9 %
10 SYRINGE (ML) INJECTION ONCE AS NEEDED
Status: COMPLETED | OUTPATIENT
Start: 2023-06-09 | End: 2023-06-09

## 2023-06-09 RX ADMIN — FUROSEMIDE 40 MG: 10 INJECTION, SOLUTION INTRAMUSCULAR; INTRAVENOUS at 13:45

## 2023-06-09 RX ADMIN — SODIUM CHLORIDE, PRESERVATIVE FREE 10 ML: 5 INJECTION INTRAVENOUS at 13:00

## 2023-06-09 RX ADMIN — TECHNESCAN TC 99M MERTIATIDE 8 MILLICURIE: 1 INJECTION, POWDER, LYOPHILIZED, FOR SOLUTION INTRAVENOUS at 13:00

## 2023-07-05 ENCOUNTER — PREP FOR PROCEDURE (OUTPATIENT)
Dept: UROLOGY | Age: 78
End: 2023-07-05

## 2023-07-05 ENCOUNTER — TELEPHONE (OUTPATIENT)
Dept: UROLOGY | Age: 78
End: 2023-07-05

## 2023-07-05 ENCOUNTER — OFFICE VISIT (OUTPATIENT)
Dept: UROLOGY | Age: 78
End: 2023-07-05
Payer: MEDICARE

## 2023-07-05 DIAGNOSIS — Q62.39 UPJ OBSTRUCTION, CONGENITAL: ICD-10-CM

## 2023-07-05 DIAGNOSIS — N13.30 HYDRONEPHROSIS, UNSPECIFIED HYDRONEPHROSIS TYPE: Primary | ICD-10-CM

## 2023-07-05 DIAGNOSIS — I25.5 ISCHEMIC CARDIOMYOPATHY: ICD-10-CM

## 2023-07-05 DIAGNOSIS — N18.31 STAGE 3A CHRONIC KIDNEY DISEASE (CKD) (HCC): ICD-10-CM

## 2023-07-05 DIAGNOSIS — I50.22 CHRONIC HFREF (HEART FAILURE WITH REDUCED EJECTION FRACTION) (HCC): ICD-10-CM

## 2023-07-05 DIAGNOSIS — N13.5 UPJ (URETEROPELVIC JUNCTION) OBSTRUCTION: Primary | ICD-10-CM

## 2023-07-05 LAB
BILIRUBIN, URINE, POC: NEGATIVE
BLOOD URINE, POC: NEGATIVE
GLUCOSE URINE, POC: NEGATIVE
KETONES, URINE, POC: NEGATIVE
LEUKOCYTE ESTERASE, URINE, POC: NEGATIVE
NITRITE, URINE, POC: NEGATIVE
PH, URINE, POC: 5.5 (ref 4.6–8)
PROTEIN,URINE, POC: NEGATIVE
SPECIFIC GRAVITY, URINE, POC: 1 (ref 1–1.03)
URINALYSIS CLARITY, POC: NORMAL
URINALYSIS COLOR, POC: NORMAL
UROBILINOGEN, POC: NORMAL

## 2023-07-05 PROCEDURE — 1036F TOBACCO NON-USER: CPT | Performed by: UROLOGY

## 2023-07-05 PROCEDURE — G8427 DOCREV CUR MEDS BY ELIG CLIN: HCPCS | Performed by: UROLOGY

## 2023-07-05 PROCEDURE — G8420 CALC BMI NORM PARAMETERS: HCPCS | Performed by: UROLOGY

## 2023-07-05 PROCEDURE — 1123F ACP DISCUSS/DSCN MKR DOCD: CPT | Performed by: UROLOGY

## 2023-07-05 PROCEDURE — 81003 URINALYSIS AUTO W/O SCOPE: CPT | Performed by: UROLOGY

## 2023-07-05 PROCEDURE — 99214 OFFICE O/P EST MOD 30 MIN: CPT | Performed by: UROLOGY

## 2023-07-05 ASSESSMENT — ENCOUNTER SYMPTOMS
NAUSEA: 0
BACK PAIN: 0

## 2023-07-05 NOTE — TELEPHONE ENCOUNTER
----- Message from Peg Will MD sent at 7/5/2023  9:24 AM EDT -----  Schedule Cystoscopy, left ureteral stent placement. Did orders. Try to schedule 7-13-23.

## 2023-07-05 NOTE — PROGRESS NOTES
placed or performed in visit on 07/05/23   AMB POC URINALYSIS DIP STICK AUTO W/O MICRO   Result Value Ref Range    Color (UA POC)      Clarity (UA POC)      Glucose, Urine, POC Negative Negative    Bilirubin, Urine, POC Negative Negative    KETONES, Urine, POC Negative Negative    Specific Gravity, Urine, POC 1.005 1.001 - 1.035    Blood (UA POC) Negative Negative    pH, Urine, POC 5.5 4.6 - 8.0    Protein, Urine, POC Negative Negative    Urobilinogen, POC Normal     Nitrite, Urine, POC Negative Negative    Leukocyte Esterase, Urine, POC Negative Negative       UA - Micro  WBC - 0  RBC - 0  Bacteria - 0  Epith - 0        Assessment and Plan    ICD-10-CM    1. Hydronephrosis, unspecified hydronephrosis type  N13.30 AMB POC URINALYSIS DIP STICK AUTO W/O MICRO      2. UPJ obstruction, congenital  Q62.39       3. Stage 3a chronic kidney disease (CKD) ([facility])  N18.31       4. Chronic HFrEF (heart failure with reduced ejection fraction) ([facility])  I50.22       5. Ischemic cardiomyopathy  I25.5       Massively dilated left kidney due to left UPJ obstruction. Poor function of left kidney. He continues to have intermittent LUQ pain. We discussed options of observation, long-term stenting, or left PATRICIA nephrectomy. He does not favor long term stent. Plan cystoscopy and left ureteral stent placement. Can plan left HALN after the left kidney gets smaller with stenting. Right kidney looks normal , the hydronephrosis on the right looks minimal.   Return for call Vielka Navarro or Doc Mendoza to schedule surgery.

## 2023-07-05 NOTE — TELEPHONE ENCOUNTER
Procedures: Procedure(s):   CYSTOSCOPY, LEFT URETERAL STENT INSERTION   Date: 7/13/2023   Time: 1500   Location:  MAIN OR 01 CYSTO     Scheduled.

## 2023-07-07 ENCOUNTER — TELEPHONE (OUTPATIENT)
Dept: PRIMARY CARE CLINIC | Facility: CLINIC | Age: 78
End: 2023-07-07

## 2023-07-07 ENCOUNTER — TELEPHONE (OUTPATIENT)
Age: 78
End: 2023-07-07

## 2023-07-07 NOTE — TELEPHONE ENCOUNTER
Patient came to [facility] office, he stated his Cardiologist office need someone from Dr Harvey Ron office to contact them regarding a procedure he will be having 7/13/23.  [address]

## 2023-07-07 NOTE — TELEPHONE ENCOUNTER
Patient called and wanted Dr. Roly Adams to be aware that the urologist is putting a stent in his kidney on Thursday 07/20/2023. Patiemt said then after this they will be removing one of his kidneys.  No Cardiac clearance has been requested from this urologist.

## 2023-07-10 ENCOUNTER — HOSPITAL ENCOUNTER (OUTPATIENT)
Dept: LAB | Age: 78
Discharge: HOME | End: 2023-07-13
Payer: MEDICARE

## 2023-07-10 DIAGNOSIS — N13.5 UPJ (URETEROPELVIC JUNCTION) OBSTRUCTION: ICD-10-CM

## 2023-07-10 DIAGNOSIS — Z01.818 PRE-OP TESTING: ICD-10-CM

## 2023-07-10 LAB
BASOPHILS # BLD: 0.1 K/UL (ref 0–0.2)
BASOPHILS NFR BLD: 1 % (ref 0–2)
DIFFERENTIAL METHOD BLD: ABNORMAL
EOSINOPHIL # BLD: 0.3 K/UL (ref 0–0.8)
EOSINOPHIL NFR BLD: 4 % (ref 0.5–7.8)
ERYTHROCYTE [DISTWIDTH] IN BLOOD BY AUTOMATED COUNT: 13.1 % (ref 11.9–14.6)
HCT VFR BLD AUTO: 40.8 % (ref 41.1–50.3)
HGB BLD-MCNC: 13.1 G/DL (ref 13.6–17.2)
IMM GRANULOCYTES # BLD AUTO: 0 K/UL (ref 0–0.5)
IMM GRANULOCYTES NFR BLD AUTO: 0 % (ref 0–5)
LYMPHOCYTES # BLD: 1.6 K/UL (ref 0.5–4.6)
LYMPHOCYTES NFR BLD: 26 % (ref 13–44)
MCH RBC QN AUTO: 31.3 PG (ref 26.1–32.9)
MCHC RBC AUTO-ENTMCNC: 32.1 G/DL (ref 31.4–35)
MCV RBC AUTO: 97.4 FL (ref 82–102)
MONOCYTES # BLD: 0.8 K/UL (ref 0.1–1.3)
MONOCYTES NFR BLD: 13 % (ref 4–12)
NEUTS SEG # BLD: 3.4 K/UL (ref 1.7–8.2)
NEUTS SEG NFR BLD: 56 % (ref 43–78)
NRBC # BLD: 0 K/UL (ref 0–0.2)
PLATELET # BLD AUTO: 158 K/UL (ref 150–450)
PMV BLD AUTO: 9.3 FL (ref 9.4–12.3)
POTASSIUM SERPL-SCNC: 4.3 MMOL/L (ref 3.5–5.1)
RBC # BLD AUTO: 4.19 M/UL (ref 4.23–5.6)
WBC # BLD AUTO: 6.1 K/UL (ref 4.3–11.1)

## 2023-07-10 PROCEDURE — 85025 COMPLETE CBC W/AUTO DIFF WBC: CPT

## 2023-07-10 PROCEDURE — 36415 COLL VENOUS BLD VENIPUNCTURE: CPT

## 2023-07-10 PROCEDURE — 80061 LIPID PANEL: CPT

## 2023-07-10 PROCEDURE — 84132 ASSAY OF SERUM POTASSIUM: CPT

## 2023-07-10 NOTE — PROGRESS NOTES
CBC, K+ within anesthesia guidelines, no follow-up required. Labs automatically routed to ordering provider via Epic documentation. Patient to return to OP Lab on 07/11/23 for UA and culture collection.

## 2023-07-11 ENCOUNTER — HOSPITAL ENCOUNTER (OUTPATIENT)
Dept: LAB | Age: 78
Discharge: HOME | End: 2023-07-14
Payer: MEDICARE

## 2023-07-11 DIAGNOSIS — E78.5 DYSLIPIDEMIA: ICD-10-CM

## 2023-07-11 LAB
APPEARANCE UR: CLEAR
BILIRUB UR QL: NEGATIVE
CHOLEST SERPL-MCNC: 137 MG/DL
COLOR UR: NORMAL
GLUCOSE UR STRIP.AUTO-MCNC: NEGATIVE MG/DL
HDLC SERPL-MCNC: 55 MG/DL (ref 40–60)
HDLC SERPL: 2.5
HGB UR QL STRIP: NEGATIVE
KETONES UR QL STRIP.AUTO: NEGATIVE MG/DL
LDLC SERPL CALC-MCNC: 65.4 MG/DL
LEUKOCYTE ESTERASE UR QL STRIP.AUTO: NEGATIVE
NITRITE UR QL STRIP.AUTO: NEGATIVE
PH UR STRIP: 6.5 (ref 5–9)
PROT UR STRIP-MCNC: NEGATIVE MG/DL
SP GR UR REFRACTOMETRY: 1.01 (ref 1–1.02)
TRIGL SERPL-MCNC: 83 MG/DL (ref 35–150)
UROBILINOGEN UR QL STRIP.AUTO: 0.2 EU/DL (ref 0.2–1)
VLDLC SERPL CALC-MCNC: 16.6 MG/DL (ref 6–23)

## 2023-07-11 PROCEDURE — 81003 URINALYSIS AUTO W/O SCOPE: CPT

## 2023-07-11 PROCEDURE — 87086 URINE CULTURE/COLONY COUNT: CPT

## 2023-07-11 NOTE — PROGRESS NOTES
UA within anesthesia guidelines, no follow-up required. Labs automatically routed to ordering provider via Epic documentation.

## 2023-07-12 ENCOUNTER — ANESTHESIA EVENT (OUTPATIENT)
Dept: SURGERY | Age: 78
End: 2023-07-12
Payer: MEDICARE

## 2023-07-13 ENCOUNTER — HOSPITAL ENCOUNTER (OUTPATIENT)
Age: 78
Setting detail: OUTPATIENT SURGERY
Discharge: HOME | End: 2023-07-13
Attending: UROLOGY | Admitting: UROLOGY
Payer: MEDICARE

## 2023-07-13 ENCOUNTER — ANESTHESIA (OUTPATIENT)
Dept: SURGERY | Age: 78
End: 2023-07-13
Payer: MEDICARE

## 2023-07-13 VITALS
HEIGHT: 70 IN | BODY MASS INDEX: 20.99 KG/M2 | OXYGEN SATURATION: 95 % | DIASTOLIC BLOOD PRESSURE: 68 MMHG | TEMPERATURE: 98.1 F | SYSTOLIC BLOOD PRESSURE: 127 MMHG | WEIGHT: 146.6 LBS | HEART RATE: 76 BPM | RESPIRATION RATE: 18 BRPM

## 2023-07-13 DIAGNOSIS — Z01.818 PRE-OP TESTING: Primary | ICD-10-CM

## 2023-07-13 LAB
BACTERIA SPEC CULT: NORMAL
SERVICE CMNT-IMP: NORMAL

## 2023-07-13 PROCEDURE — 2580000003 HC RX 258: Performed by: STUDENT IN AN ORGANIZED HEALTH CARE EDUCATION/TRAINING PROGRAM

## 2023-07-13 PROCEDURE — 7100000000 HC PACU RECOVERY - FIRST 15 MIN: Performed by: UROLOGY

## 2023-07-13 PROCEDURE — 3600000004 HC SURGERY LEVEL 4 BASE: Performed by: UROLOGY

## 2023-07-13 PROCEDURE — C1758 CATHETER, URETERAL: HCPCS | Performed by: UROLOGY

## 2023-07-13 PROCEDURE — 6360000002 HC RX W HCPCS: Performed by: UROLOGY

## 2023-07-13 PROCEDURE — 7100000011 HC PHASE II RECOVERY - ADDTL 15 MIN: Performed by: UROLOGY

## 2023-07-13 PROCEDURE — C1769 GUIDE WIRE: HCPCS | Performed by: UROLOGY

## 2023-07-13 PROCEDURE — 7100000001 HC PACU RECOVERY - ADDTL 15 MIN: Performed by: UROLOGY

## 2023-07-13 PROCEDURE — 2709999900 HC NON-CHARGEABLE SUPPLY: Performed by: UROLOGY

## 2023-07-13 PROCEDURE — 7100000010 HC PHASE II RECOVERY - FIRST 15 MIN: Performed by: UROLOGY

## 2023-07-13 PROCEDURE — C2617 STENT, NON-COR, TEM W/O DEL: HCPCS | Performed by: UROLOGY

## 2023-07-13 PROCEDURE — 2500000003 HC RX 250 WO HCPCS: Performed by: STUDENT IN AN ORGANIZED HEALTH CARE EDUCATION/TRAINING PROGRAM

## 2023-07-13 PROCEDURE — 3600000014 HC SURGERY LEVEL 4 ADDTL 15MIN: Performed by: UROLOGY

## 2023-07-13 PROCEDURE — 3700000000 HC ANESTHESIA ATTENDED CARE: Performed by: UROLOGY

## 2023-07-13 PROCEDURE — 6360000002 HC RX W HCPCS: Performed by: STUDENT IN AN ORGANIZED HEALTH CARE EDUCATION/TRAINING PROGRAM

## 2023-07-13 PROCEDURE — 3700000001 HC ADD 15 MINUTES (ANESTHESIA): Performed by: UROLOGY

## 2023-07-13 PROCEDURE — 6360000004 HC RX CONTRAST MEDICATION: Performed by: UROLOGY

## 2023-07-13 DEVICE — URETERAL STENT WITH SIDE HOLES 7FX26CM
Type: IMPLANTABLE DEVICE | Site: URETER | Status: FUNCTIONAL
Brand: TRIA™ SOFT

## 2023-07-13 RX ORDER — LIDOCAINE HYDROCHLORIDE 20 MG/ML
INJECTION, SOLUTION EPIDURAL; INFILTRATION; INTRACAUDAL; PERINEURAL PRN
Status: DISCONTINUED | OUTPATIENT
Start: 2023-07-13 | End: 2023-07-13 | Stop reason: SDUPTHER

## 2023-07-13 RX ORDER — DIPHENHYDRAMINE HYDROCHLORIDE 50 MG/ML
12.5 INJECTION INTRAMUSCULAR; INTRAVENOUS
Status: DISCONTINUED | OUTPATIENT
Start: 2023-07-13 | End: 2023-07-13 | Stop reason: HOSPADM

## 2023-07-13 RX ORDER — MIDAZOLAM HYDROCHLORIDE 2 MG/2ML
2 INJECTION, SOLUTION INTRAMUSCULAR; INTRAVENOUS
Status: DISCONTINUED | OUTPATIENT
Start: 2023-07-13 | End: 2023-07-13 | Stop reason: HOSPADM

## 2023-07-13 RX ORDER — FENTANYL CITRATE 50 UG/ML
25 INJECTION, SOLUTION INTRAMUSCULAR; INTRAVENOUS
Status: DISCONTINUED | OUTPATIENT
Start: 2023-07-13 | End: 2023-07-13 | Stop reason: HOSPADM

## 2023-07-13 RX ORDER — ONDANSETRON 2 MG/ML
INJECTION INTRAMUSCULAR; INTRAVENOUS PRN
Status: DISCONTINUED | OUTPATIENT
Start: 2023-07-13 | End: 2023-07-13 | Stop reason: SDUPTHER

## 2023-07-13 RX ORDER — SODIUM CHLORIDE, SODIUM LACTATE, POTASSIUM CHLORIDE, CALCIUM CHLORIDE 600; 310; 30; 20 MG/100ML; MG/100ML; MG/100ML; MG/100ML
INJECTION, SOLUTION INTRAVENOUS CONTINUOUS
Status: DISCONTINUED | OUTPATIENT
Start: 2023-07-13 | End: 2023-07-13 | Stop reason: HOSPADM

## 2023-07-13 RX ORDER — PROCHLORPERAZINE EDISYLATE 5 MG/ML
5 INJECTION INTRAMUSCULAR; INTRAVENOUS
Status: DISCONTINUED | OUTPATIENT
Start: 2023-07-13 | End: 2023-07-13 | Stop reason: HOSPADM

## 2023-07-13 RX ORDER — PHENAZOPYRIDINE HYDROCHLORIDE 200 MG/1
200 TABLET, FILM COATED ORAL 3 TIMES DAILY PRN
Qty: 30 TABLET | Refills: 5 | Status: SHIPPED | OUTPATIENT
Start: 2023-07-13 | End: 2023-07-23

## 2023-07-13 RX ORDER — IPRATROPIUM BROMIDE AND ALBUTEROL SULFATE 2.5; .5 MG/3ML; MG/3ML
1 SOLUTION RESPIRATORY (INHALATION)
Status: DISCONTINUED | OUTPATIENT
Start: 2023-07-13 | End: 2023-07-13 | Stop reason: HOSPADM

## 2023-07-13 RX ORDER — DEXAMETHASONE SODIUM PHOSPHATE 4 MG/ML
INJECTION, SOLUTION INTRA-ARTICULAR; INTRALESIONAL; INTRAMUSCULAR; INTRAVENOUS; SOFT TISSUE PRN
Status: DISCONTINUED | OUTPATIENT
Start: 2023-07-13 | End: 2023-07-13 | Stop reason: SDUPTHER

## 2023-07-13 RX ORDER — HALOPERIDOL 5 MG/ML
1 INJECTION INTRAMUSCULAR
Status: DISCONTINUED | OUTPATIENT
Start: 2023-07-13 | End: 2023-07-13 | Stop reason: HOSPADM

## 2023-07-13 RX ORDER — OXYCODONE HYDROCHLORIDE 5 MG/1
5 TABLET ORAL PRN
Status: DISCONTINUED | OUTPATIENT
Start: 2023-07-13 | End: 2023-07-13 | Stop reason: HOSPADM

## 2023-07-13 RX ORDER — PROPOFOL 10 MG/ML
INJECTION, EMULSION INTRAVENOUS PRN
Status: DISCONTINUED | OUTPATIENT
Start: 2023-07-13 | End: 2023-07-13 | Stop reason: SDUPTHER

## 2023-07-13 RX ORDER — FENTANYL CITRATE 50 UG/ML
100 INJECTION, SOLUTION INTRAMUSCULAR; INTRAVENOUS
Status: DISCONTINUED | OUTPATIENT
Start: 2023-07-13 | End: 2023-07-13 | Stop reason: HOSPADM

## 2023-07-13 RX ORDER — OXYCODONE HYDROCHLORIDE 5 MG/1
10 TABLET ORAL PRN
Status: DISCONTINUED | OUTPATIENT
Start: 2023-07-13 | End: 2023-07-13 | Stop reason: HOSPADM

## 2023-07-13 RX ORDER — LIDOCAINE HYDROCHLORIDE 10 MG/ML
1 INJECTION, SOLUTION INFILTRATION; PERINEURAL
Status: DISCONTINUED | OUTPATIENT
Start: 2023-07-13 | End: 2023-07-13 | Stop reason: HOSPADM

## 2023-07-13 RX ORDER — SODIUM CHLORIDE 0.9 % (FLUSH) 0.9 %
5-40 SYRINGE (ML) INJECTION EVERY 12 HOURS SCHEDULED
Status: DISCONTINUED | OUTPATIENT
Start: 2023-07-13 | End: 2023-07-13 | Stop reason: HOSPADM

## 2023-07-13 RX ORDER — LABETALOL HYDROCHLORIDE 5 MG/ML
10 INJECTION, SOLUTION INTRAVENOUS
Status: DISCONTINUED | OUTPATIENT
Start: 2023-07-13 | End: 2023-07-13 | Stop reason: HOSPADM

## 2023-07-13 RX ORDER — SODIUM CHLORIDE 9 MG/ML
INJECTION, SOLUTION INTRAVENOUS PRN
Status: DISCONTINUED | OUTPATIENT
Start: 2023-07-13 | End: 2023-07-13 | Stop reason: HOSPADM

## 2023-07-13 RX ORDER — SODIUM CHLORIDE 0.9 % (FLUSH) 0.9 %
5-40 SYRINGE (ML) INJECTION PRN
Status: DISCONTINUED | OUTPATIENT
Start: 2023-07-13 | End: 2023-07-13 | Stop reason: HOSPADM

## 2023-07-13 RX ORDER — HYDROMORPHONE HYDROCHLORIDE 2 MG/ML
0.5 INJECTION, SOLUTION INTRAMUSCULAR; INTRAVENOUS; SUBCUTANEOUS EVERY 5 MIN PRN
Status: DISCONTINUED | OUTPATIENT
Start: 2023-07-13 | End: 2023-07-13 | Stop reason: HOSPADM

## 2023-07-13 RX ORDER — ETOMIDATE 2 MG/ML
INJECTION INTRAVENOUS PRN
Status: DISCONTINUED | OUTPATIENT
Start: 2023-07-13 | End: 2023-07-13 | Stop reason: SDUPTHER

## 2023-07-13 RX ORDER — HYDRALAZINE HYDROCHLORIDE 20 MG/ML
10 INJECTION INTRAMUSCULAR; INTRAVENOUS
Status: DISCONTINUED | OUTPATIENT
Start: 2023-07-13 | End: 2023-07-13 | Stop reason: HOSPADM

## 2023-07-13 RX ADMIN — PROPOFOL 30 MG: 10 INJECTION, EMULSION INTRAVENOUS at 15:30

## 2023-07-13 RX ADMIN — PHENYLEPHRINE HYDROCHLORIDE 50 MCG: 10 INJECTION INTRAVENOUS at 15:45

## 2023-07-13 RX ADMIN — FENTANYL CITRATE 25 MCG: 50 INJECTION INTRAMUSCULAR; INTRAVENOUS at 15:35

## 2023-07-13 RX ADMIN — PHENYLEPHRINE HYDROCHLORIDE 50 MCG: 10 INJECTION INTRAVENOUS at 15:31

## 2023-07-13 RX ADMIN — ETOMIDATE 4 MG: 2 INJECTION, SOLUTION INTRAVENOUS at 15:30

## 2023-07-13 RX ADMIN — LIDOCAINE HYDROCHLORIDE 60 MG: 20 INJECTION, SOLUTION EPIDURAL; INFILTRATION; INTRACAUDAL; PERINEURAL at 15:27

## 2023-07-13 RX ADMIN — SODIUM CHLORIDE, POTASSIUM CHLORIDE, SODIUM LACTATE AND CALCIUM CHLORIDE: 600; 310; 30; 20 INJECTION, SOLUTION INTRAVENOUS at 13:57

## 2023-07-13 RX ADMIN — Medication 2 G: at 15:36

## 2023-07-13 RX ADMIN — ONDANSETRON 4 MG: 2 INJECTION INTRAMUSCULAR; INTRAVENOUS at 15:31

## 2023-07-13 RX ADMIN — ETOMIDATE 8 MG: 2 INJECTION, SOLUTION INTRAVENOUS at 15:27

## 2023-07-13 RX ADMIN — DEXAMETHASONE SODIUM PHOSPHATE 4 MG: 4 INJECTION, SOLUTION INTRAMUSCULAR; INTRAVENOUS at 15:31

## 2023-07-13 RX ADMIN — ETOMIDATE 4 MG: 2 INJECTION, SOLUTION INTRAVENOUS at 15:31

## 2023-07-13 RX ADMIN — PROPOFOL 40 MG: 10 INJECTION, EMULSION INTRAVENOUS at 15:27

## 2023-07-13 ASSESSMENT — PAIN - FUNCTIONAL ASSESSMENT
PAIN_FUNCTIONAL_ASSESSMENT: NONE - DENIES PAIN
PAIN_FUNCTIONAL_ASSESSMENT: 0-10
PAIN_FUNCTIONAL_ASSESSMENT: NONE - DENIES PAIN

## 2023-07-13 ASSESSMENT — ENCOUNTER SYMPTOMS: SHORTNESS OF BREATH: 1

## 2023-07-13 NOTE — ANESTHESIA PROCEDURE NOTES
Airway  Date/Time: 7/13/2023 3:32 PM  Urgency: elective    Airway not difficult    General Information and Staff    Patient location during procedure: OR  Resident/CRNA: ABIMAEL Nunez CRNA  Performed: resident/CRNA     Indications and Patient Condition  Indications for airway management: anesthesia  Spontaneous Ventilation: absent  Sedation level: deep  Preoxygenated: yes  Patient position: sniffing  Mask difficulty assessment: vent by bag mask    Final Airway Details  Final airway type: supraglottic airway      Successful airway: oropharyngeal  Size 5     Number of attempts at approach: 1  Ventilation between attempts: supraglottic airway  Number of other approaches attempted: 0    Additional Comments  Atraumatic insertion, lips and teeth as preeval

## 2023-07-13 NOTE — DISCHARGE INSTRUCTIONS
If you have had surgery in the past 7-10 days by one of our providers and are having fever, bleeding, or drainage from an incision, have an opening in an incision, or having issues urinating properly, please call [phone]. Ureteral Stent Placement: What to Expect at [address]  A ureteral (say \"you-REE-ter-ul\") stent is a thin, hollow tube that is placed in the ureter to help urine pass from the kidney into the bladder. Ureters are the tubes that connect the kidneys to the bladder. You may have a small amount of blood in your urine for 1 to 3 days after the procedure. While the stent is in place, you may have to urinate more often, feel a sudden need to urinate, or feel like you cannot completely empty your bladder. You may feel some pain when you urinate or do strenuous activity. You also may notice a small amount of blood in your urine after strenuous activities. These side effects usually do not prevent people from doing their normal daily activities. You may have a string attached to your stent. Do not to pull the string unless the doctor tells you to. The doctor will use the string to pull out the stent when you no longer need it. After the procedure, urine may flow better from your kidneys to your bladder. A ureteral stent may be left in place for several days or for as long as several months. Your doctor will take it out when you no longer need it. Cystoscopy: What to Expect at [address]  A cystoscopy is a procedure that lets a doctor look inside of the bladder and the urethra. The urethra is the tube that carries urine from the bladder to outside the body. The doctor uses a thin, lighted tube called a cystoscope to look for kidney or bladder stones, tumors, bleeding, or infection. After the cystoscopy, your urethra may be sore at first, and it may burn when you urinate for the first few days after the procedure.  You may feel the need to urinate more often, and your urine may

## 2023-07-13 NOTE — BRIEF OP NOTE
Brief Postoperative Note      Patient: Tono Brown  YOB: 1945  MRN: [id number]    Date of Procedure: 7/13/2023    Pre-Op Diagnosis Codes: * Hydronephrosis [N13.30]  Left UPJ obstruction  Post-Op Diagnosis: Same       Procedure(s):  CYSTOSCOPY, LEFT URETERAL STENT INSERTION/ Has ICD  CYSTOSCOPY RETROGRADE PYELOGRAM    Surgeon(s):  Siobhan Deluca MD    Assistant:  * No surgical staff found *    Anesthesia: General    Estimated Blood Loss (mL): Minimal    Complications: None    Specimens:   * No specimens in log *    Implants:  Implant Name Type Inv.  Item Serial No.  Lot No. LRB No. Used Action   STENT URETERAL 7 [id number] CM SOFT MONOFILAMENT TRIA - [id number]  STENT URETERAL 7 FRX26 CM SOFT MONOFILAMENT TRIA  [employer] [facility]- [id number] Left 1 Implanted         Drains: * No LDAs found *    Findings: see op note      Electronically signed by Siobhan Bee MD on 7/13/2023 at 4:15 PM

## 2023-07-13 NOTE — ANESTHESIA POSTPROCEDURE EVALUATION
Department of Anesthesiology  Postprocedure Note    Patient: Yadira Morgan  MRN: [id number]  YOB: 1945  Date of evaluation: 7/13/2023      Procedure Summary     Date: 07/13/23 Room / Location: [facility] MAIN OR 01 CYSTO / [facility] MAIN OR    Anesthesia Start: 1517 Anesthesia Stop: 1620    Procedures:       CYSTOSCOPY, LEFT URETERAL STENT INSERTION/ Has ICD (Left: Ureter)      CYSTOSCOPY RETROGRADE PYELOGRAM (Bilateral: Ureter) Diagnosis:       Hydronephrosis      (Hydronephrosis [N13.30])    Providers: Yarelis Mello MD Responsible Provider: Meli Gonzalez MD    Anesthesia Type: General ASA Status: 4          Anesthesia Type: General    Azra Phase I: Azra Score: 10    Azra Phase II: Azra Score: 10      Anesthesia Post Evaluation    Patient location during evaluation: bedside  Patient participation: complete - patient participated  Level of consciousness: awake and alert  Airway patency: patent  Nausea & Vomiting: no vomiting  Complications: no  Cardiovascular status: hemodynamically stable  Respiratory status: acceptable  Hydration status: euvolemic

## 2023-07-13 NOTE — H&P
Alberto Grant  : 1945         Chief Complaint   Patient presents with    Results            HPI      Alberto Grant is a 77 y.o. male   Recently had work-up due to shortness of breath. Chest CT 23: IMPRESSION:  1. Areas of scattered subpleural fibrotic change at the lung bases, along the  lateral left major fissure and in the anterior medial upper lobes bilaterally. No significant associated groundglass opacities. Areas of traction  bronchiectasis noted in these areas. No significant mucoid impactions, trait or  mass. No pleural effusions. 2. Probable massive left hydronephrosis where imaged through the upper abdomen. Follow-up with urology suggested. revealed severe left hydronephrosis. Patient's creatinine is slightly elevated at 1.45. He was referred here for further evaluation. We have seen patient in the past due to probable bilateral UPJ obstructions. Its been over 4 years since his last MAG3 renal scan. He does have pain in the back. The the pain can be bilateral in nature but most days it is worse on the left than it is the right. See previous history as below. Previous urological history as below: copied from previous chart. Initially he was Referred by ER., pt went to ER on 19 wth c/o right abdominal and right flank pain. Had CT A/P w contrast: IMPRESSION:    1) There is perinephric fluid on the right with stranding densities in the  retroperitoneum. Etiology is not clear. 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  2) Additional findings include trace left pleural effusion, evidence of  sternotomy and cardiac pacemaker, moderate size hiatal hernia, moderate  thoracolumbar scoliosis, thickening of the urinary bladder wall and sigmoid  diverticulosis without definite acute inflammatory changes, bilateral L5  spondylolysis and grade 1-2 spondylolisthesis. Damon Courtney   He states that I did what sounds like a TURP in  (records not

## 2023-07-14 NOTE — OP NOTE
[facility]  OPERATIVE REPORT    Name:  Keyon Odom  MR#:  [id number]  :  1945  ACCOUNT #:  [de-identified]  DATE OF SERVICE:  2023    PREOPERATIVE DIAGNOSES:  1. Left hydronephrosis. 2.  Left ureteropelvic junction obstruction. POSTOPERATIVE DIAGNOSES:  1. Left hydronephrosis. 2.  Left ureteropelvic junction obstruction. PROCEDURE PERFORMED:  Cystoscopy, bilateral retrograde pyelograms with interpretation of pyelograms, placement of left double-J ureteral stent. SURGEON:  Zackery Victoria. Leslie Sheridan MD.    ASSISTANT:  None. ANESTHESIA:  General.    COMPLICATIONS:  None. SPECIMENS REMOVED:  None. IMPLANTS:  Left ureteral stent. ESTIMATED BLOOD LOSS:  None. FINDINGS:  Mild dilation of the right renal pelvis without evidence of obstruction. Left ureteropelvic junction obstruction with massive hydronephrosis of the left renal pelvis. DESCRIPTION OF PROCEDURE:  The patient was given a general anesthetic, placed in the dorsal lithotomy position. He was prepped and draped in a sterile fashion. I passed a 22-Korean cystoscope into the urethra using video camera and 30-degree lens. The anterior urethra was normal.  Prostatic fossa was status post TURP and was open. The scope was passed in the bladder. Bladder showed 2+ trabeculation. There were no stones or tumors. A cone-tipped catheter was used to perform a right retrograde pyelogram.  Interpretation of pyelogram: Contrast opacifies the right ureter which is normal.  The right renal pelvis was slightly dilated but there was no evidence of any obstruction at the UPJ. The calyces were sharp on the right. Left retrograde pyelogram was performed. The ureter was normal.  There was narrowing at the UPJ. There was a massively dilated left renal pelvis with poor drainage. This was consistent with chronic left UPJ obstruction with massive hydronephrosis.     At this point, I passed a sensor wire up the left

## 2023-07-17 ENCOUNTER — HOSPITAL ENCOUNTER (OUTPATIENT)
Dept: GENERAL RADIOLOGY | Age: 78
Discharge: HOME | End: 2023-07-20

## 2023-07-18 NOTE — PROGRESS NOTES
W/BRONCHODILATOR   Result Value Ref Range    FEV 1 , POC 3.67 L    FEV1 % Pred POC 84 %    FVC, POC 2.93 L     FVC % Pred POC 89 %    FEV1/FVC, POC 80%     No results found for this or any previous visit. FeNO: No results found for this or any previous visit. FeNO and Likelihood of Eosinophilic Asthma   Unlikely Intermediate Likely   <25 ppb 25-50 ppb >50ppb     Exercise Oximetry:    Echo:   TRANSTHORACIC ECHOCARDIOGRAM (TTE) COMPLETE (CONTRAST/BUBBLE/3D PRN) 11/03/2022    Interpretation Summary    Left Ventricle: Severely reduced left ventricular systolic function with a visually estimated EF of 30 - 35%. Left ventricle size is normal. Normal wall thickness. Severe global hypokinesis present. Abnormal diastolic function. Right Ventricle: Right ventricle is mildly dilated. Normal wall thickness. RV basal diameter is 4.1 cm. RV mid diameter is 3.0 cm. Moderately reduced systolic function. TAPSE is 1.1 cm. Aortic Valve: Valve structure is normal. Moderate sclerosis of the aortic valve cusp. Mitral Valve: Mild regurgitation. Tricuspid Valve: Mild to moderate regurgitation with a centrally directed jet. The estimated RVSP is 29 mmHg. Aorta: Normal sized annulus, sinus of Valsalva and aortic arch. Mildly dilated ascending aorta. 4.2 cm    Technical qualifiers: Color flow Doppler was performed and pulse wave and/or continuous wave Doppler was performed.       [facility] Reference Info:                                                                                                                  Immunization History   Administered Date(s) Administered    COVID-19, MODERNA BLUE border, Primary or Immunocompromised, (age 12y+), IM, 100 mcg/0.5mL 01/01/2021, 02/01/2021    COVID-19, MODERNA Booster BLUE border, (age 18y+), IM, 50mcg/0.25mL 10/01/2021    DTaP vaccine 06/07/2010    Influenza Virus Vaccine 10/01/2022    PPD Test 06/04/2014     Past Medical History:   Diagnosis Date    Anemia     Aspirin long-term use

## 2023-07-19 ENCOUNTER — OFFICE VISIT (OUTPATIENT)
Dept: PULMONOLOGY | Age: 78
End: 2023-07-19
Payer: MEDICARE

## 2023-07-19 VITALS
DIASTOLIC BLOOD PRESSURE: 66 MMHG | HEIGHT: 70 IN | WEIGHT: 145.2 LBS | RESPIRATION RATE: 18 BRPM | TEMPERATURE: 97 F | SYSTOLIC BLOOD PRESSURE: 98 MMHG | BODY MASS INDEX: 20.79 KG/M2 | HEART RATE: 64 BPM | OXYGEN SATURATION: 99 %

## 2023-07-19 DIAGNOSIS — J84.112 IPF (IDIOPATHIC PULMONARY FIBROSIS) (HCC): ICD-10-CM

## 2023-07-19 DIAGNOSIS — J47.9 BRONCHIECTASIS WITHOUT COMPLICATION (HCC): Primary | ICD-10-CM

## 2023-07-19 PROCEDURE — 3078F DIAST BP <80 MM HG: CPT | Performed by: NURSE PRACTITIONER

## 2023-07-19 PROCEDURE — 3074F SYST BP LT 130 MM HG: CPT | Performed by: NURSE PRACTITIONER

## 2023-07-19 PROCEDURE — 1123F ACP DISCUSS/DSCN MKR DOCD: CPT | Performed by: NURSE PRACTITIONER

## 2023-07-19 PROCEDURE — G8420 CALC BMI NORM PARAMETERS: HCPCS | Performed by: NURSE PRACTITIONER

## 2023-07-19 PROCEDURE — 94664 DEMO&/EVAL PT USE INHALER: CPT | Performed by: NURSE PRACTITIONER

## 2023-07-19 PROCEDURE — 1036F TOBACCO NON-USER: CPT | Performed by: NURSE PRACTITIONER

## 2023-07-19 PROCEDURE — G8427 DOCREV CUR MEDS BY ELIG CLIN: HCPCS | Performed by: NURSE PRACTITIONER

## 2023-07-19 PROCEDURE — 99214 OFFICE O/P EST MOD 30 MIN: CPT | Performed by: NURSE PRACTITIONER

## 2023-07-19 RX ORDER — ALBUTEROL SULFATE 90 UG/1
2 AEROSOL, METERED RESPIRATORY (INHALATION) 3 TIMES DAILY
Qty: 3 EACH | Refills: 3 | Status: SHIPPED | OUTPATIENT
Start: 2023-07-19

## 2023-07-28 ENCOUNTER — OFFICE VISIT (OUTPATIENT)
Dept: SLEEP MEDICINE | Age: 78
End: 2023-07-28
Payer: MEDICARE

## 2023-07-28 VITALS
HEART RATE: 76 BPM | RESPIRATION RATE: 16 BRPM | TEMPERATURE: 96.8 F | SYSTOLIC BLOOD PRESSURE: 122 MMHG | WEIGHT: 145.4 LBS | OXYGEN SATURATION: 100 % | DIASTOLIC BLOOD PRESSURE: 80 MMHG | BODY MASS INDEX: 20.81 KG/M2 | HEIGHT: 70 IN

## 2023-07-28 DIAGNOSIS — G47.33 OSA (OBSTRUCTIVE SLEEP APNEA): Primary | ICD-10-CM

## 2023-07-28 DIAGNOSIS — G47.31 CENTRAL SLEEP APNEA: ICD-10-CM

## 2023-07-28 PROCEDURE — G8420 CALC BMI NORM PARAMETERS: HCPCS | Performed by: INTERNAL MEDICINE

## 2023-07-28 PROCEDURE — 1123F ACP DISCUSS/DSCN MKR DOCD: CPT | Performed by: INTERNAL MEDICINE

## 2023-07-28 PROCEDURE — 3079F DIAST BP 80-89 MM HG: CPT | Performed by: INTERNAL MEDICINE

## 2023-07-28 PROCEDURE — 3074F SYST BP LT 130 MM HG: CPT | Performed by: INTERNAL MEDICINE

## 2023-07-28 PROCEDURE — 1036F TOBACCO NON-USER: CPT | Performed by: INTERNAL MEDICINE

## 2023-07-28 PROCEDURE — 99214 OFFICE O/P EST MOD 30 MIN: CPT | Performed by: INTERNAL MEDICINE

## 2023-07-28 PROCEDURE — G8427 DOCREV CUR MEDS BY ELIG CLIN: HCPCS | Performed by: INTERNAL MEDICINE

## 2023-07-28 NOTE — ASSESSMENT & PLAN NOTE
Patient has both obstructive and central sleep apnea doing fairly well on BiPAP 10/6 with a backup rate of 14 however still having breakthrough events of central and obstructive, he is having a high leak which is probably contributing we will do a nasal mask fitting as well as replace his machine as he has given the morning that he is at the end of his motor life. We will see him back in 3 months and I will pull a download in about a month to see if he is under better control. Interesting that no Cheyne-Gottlieb respirations were seen, certainly has a set up for central apnea secondary to congestive heart failure, unfortunately his EF is too low to be considered for ASV therapy. He has been diagnosed with pulmonary fibrosis that they are just monitoring, crackles apparent on exam today. We will check overnight oximetry on settings to see if he is having significant desaturations. We discussed repeating an in lab study to get better control he would like to hold off on this unless absolutely necessary. I advised him should his oxygen the low our hand will be forced and we will need to bring him in for another study.

## 2023-07-28 NOTE — PROGRESS NOTES
[address] [facility]. [address]  [phone]    PATIENT ID    Mr. Jill Gerardo  1945  His primary provider is Venkata Neal MD    CC: Mr. Huang Later returns to the clinic today for follow up of his obstructive sleep apnea. INTERVAL HISTORY  Mr. 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 He has extensive past medical history notable for coronary artery disease, heart failure with reduced EF last echo shows EF 30 to 35% in 2020 with global hypokinesis ROS BP estimate 29, history of CABG, rheumatoid Tritus, recent diagnosis pulmonary fibrosis, chronic kidney disease stage III neuropathy, recent diagnosis of hydronephrosis status post stenting. He does have a history of restless leg syndrome by chart review as well as chronic low back pain. I meeting him for the first time today. He is utilizing BiPAP therapy with his longtime settings of 10/6 with a backup rate of 14, he still feels his sleep is nonrestorative and feels tired during the day more fatigued than true sleepiness. He notes his nasal mask will leak but did not bring it to clinic today so we cannot do a fitting, he is interested in trying new nasal mask. He sleeps with his wife and he is unaware if he stops breathing or not but she has not told. He is getting an error on his machine saying that the motor life has been exceeded and is interested in getting a new machine. Since last visit he has engaged with pulmonology and found to have pulmonary fibrosis and they are monitoring clinically as a conservative approach. He has never been on oxygen previously. He was last seen by Sudhakar  in May 2021 and was compliant and getting good clinical benefits from BiPAP therapy.   Since implementing BiPAP therapy Mr. Reina Chester feels more rested and less
Detail Level: Simple

## 2023-08-09 ENCOUNTER — TELEPHONE (OUTPATIENT)
Dept: SLEEP MEDICINE | Age: 78
End: 2023-08-09

## 2023-08-09 NOTE — TELEPHONE ENCOUNTER
I will send a message to [facility]. His order was accepted on 8/4/23 by someone in the office.

## 2023-08-09 NOTE — TELEPHONE ENCOUNTER
Patient has not heard from anyone concerning cpap machine and supplies.   Please call and check on this

## 2023-08-12 PROBLEM — Z01.818 PRE-OP TESTING: Status: RESOLVED | Noted: 2023-07-13 | Resolved: 2023-08-12

## 2023-08-23 ENCOUNTER — OFFICE VISIT (OUTPATIENT)
Dept: UROLOGY | Age: 78
End: 2023-08-23
Payer: MEDICARE

## 2023-08-23 DIAGNOSIS — Q62.39 UPJ OBSTRUCTION, CONGENITAL: Primary | ICD-10-CM

## 2023-08-23 DIAGNOSIS — N18.31 STAGE 3A CHRONIC KIDNEY DISEASE (CKD) (HCC): ICD-10-CM

## 2023-08-23 LAB
BILIRUBIN, URINE, POC: NEGATIVE
BLOOD URINE, POC: NORMAL
GLUCOSE URINE, POC: NEGATIVE
KETONES, URINE, POC: NEGATIVE
LEUKOCYTE ESTERASE, URINE, POC: NEGATIVE
NITRITE, URINE, POC: NEGATIVE
PH, URINE, POC: 6.5 (ref 4.6–8)
PROTEIN,URINE, POC: NEGATIVE
SPECIFIC GRAVITY, URINE, POC: 1.02 (ref 1–1.03)
URINALYSIS CLARITY, POC: NORMAL
URINALYSIS COLOR, POC: NORMAL
UROBILINOGEN, POC: NORMAL

## 2023-08-23 PROCEDURE — G8420 CALC BMI NORM PARAMETERS: HCPCS | Performed by: UROLOGY

## 2023-08-23 PROCEDURE — G8428 CUR MEDS NOT DOCUMENT: HCPCS | Performed by: UROLOGY

## 2023-08-23 PROCEDURE — 1123F ACP DISCUSS/DSCN MKR DOCD: CPT | Performed by: UROLOGY

## 2023-08-23 PROCEDURE — 99213 OFFICE O/P EST LOW 20 MIN: CPT | Performed by: UROLOGY

## 2023-08-23 PROCEDURE — 1036F TOBACCO NON-USER: CPT | Performed by: UROLOGY

## 2023-08-23 PROCEDURE — 81003 URINALYSIS AUTO W/O SCOPE: CPT | Performed by: UROLOGY

## 2023-08-23 ASSESSMENT — ENCOUNTER SYMPTOMS
BACK PAIN: 0
NAUSEA: 0

## 2023-08-23 NOTE — PROGRESS NOTES
[facility]  [address] [place] [id number] [phone]    Cathy Duque  : 1945    Chief Complaint   Patient presents with    Follow-up     Post op        HPI     Cathy Duque is a 77 y.o. male   Recently had work-up due to shortness of breath. Chest CT 23: IMPRESSION:  1. Areas of scattered subpleural fibrotic change at the lung bases, along the  lateral left major fissure and in the anterior medial upper lobes bilaterally. No significant associated groundglass opacities. Areas of traction  bronchiectasis noted in these areas. No significant mucoid impactions, trait or  mass. No pleural effusions. 2. Probable massive left hydronephrosis where imaged through the upper abdomen. Follow-up with urology suggested. revealed severe left hydronephrosis. Patient's creatinine is slightly elevated at 1.45. He was referred here for further evaluation. We have seen patient in the past due to probable bilateral UPJ obstructions. Its been over 4 years since his last MAG3 renal scan. He does have pain in the back. The the pain can be bilateral in nature but most days it is worse on the left than it is the right. See previous history as below. Previous urological history as below: copied from previous chart. Initially he was Referred by ER., pt went to ER on 19 wth c/o right abdominal and right flank pain. Had CT A/P w contrast: IMPRESSION:    1) There is perinephric fluid on the right with stranding densities in the  retroperitoneum. Etiology is not clear.  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  2) Additional findings include trace left pleural effusion, evidence of  sternotomy and cardiac pacemaker, moderate size hiatal hernia, moderate  thoracolumbar scoliosis, thickening of the urinary bladder wall and sigmoid  diverticulosis without definite acute inflammatory changes, bilateral L5  spondylolysis and grade 1-2

## 2023-08-24 ENCOUNTER — TELEPHONE (OUTPATIENT)
Dept: SLEEP MEDICINE | Age: 78
End: 2023-08-24

## 2023-08-24 NOTE — TELEPHONE ENCOUNTER
Spoke with pt. Order will be sent to [address] [place] [address] Andrew. Pt is aware . I will send him the company information on my chart .

## 2023-08-24 NOTE — TELEPHONE ENCOUNTER
[employer] no longer will cover [employer] ,patient needs a new DME company .  They will need a new prescriptions and office notes

## 2023-08-29 DIAGNOSIS — I25.5 ISCHEMIC CARDIOMYOPATHY: ICD-10-CM

## 2023-08-29 DIAGNOSIS — Z95.810 BIVENTRICULAR AUTOMATIC IMPLANTABLE CARDIOVERTER DEFIBRILLATOR IN SITU: ICD-10-CM

## 2023-08-30 PROCEDURE — 93295 DEV INTERROG REMOTE 1/2/MLT: CPT | Performed by: INTERNAL MEDICINE

## 2023-08-30 PROCEDURE — 93296 REM INTERROG EVL PM/IDS: CPT | Performed by: INTERNAL MEDICINE

## 2023-09-07 ENCOUNTER — TELEPHONE (OUTPATIENT)
Dept: SLEEP MEDICINE | Age: 78
End: 2023-09-07

## 2023-09-07 NOTE — TELEPHONE ENCOUNTER
----- Message from John Sagastume III, MD sent at 7/28/2023 10:55 AM EDT -----  Regarding: Please pull download  Please pull download on current settings [employer] 10/6 with a backup rate of 14

## 2023-09-13 NOTE — TELEPHONE ENCOUNTER
Download reviewed, he is still having some leak issues but not as bad as before and AHI also slightly improved. We will continue him on current settings until follow-up. Please let patient know his download looks a little bit better and no changes to therapy at this time.

## 2023-10-24 ENCOUNTER — TELEPHONE (OUTPATIENT)
Dept: SLEEP MEDICINE | Age: 78
End: 2023-10-24

## 2023-10-24 NOTE — TELEPHONE ENCOUNTER
Overnight oximetry reviewed on current PAP settings from 10/17-23, overall oxygenation looks good spending less than 2 minutes below 88%, did have an FELIBERTO of 25. SPO2 emilio 71% but this was by far an outlier. No changes to therapy. Please let patient know his oxygen looks good on PAP, thank you.

## 2023-10-31 NOTE — PROGRESS NOTES
38  Nov 2022        EF 30-35%, ascending aorta 4.2 cm, mild/mod MR        NST - fixed inferior defect with small area of reversibility at the apex  Apr 2023       LHC - occluded native Dx, RI, Cx and RCA. Atretic LIMA to LAD but antegrade native LAD flow. Occluded SVG-RCA, moderate SVG-OM disease, patent VG-Dx, EF 25-30%               Key CAD CHF Meds            spironolactone (ALDACTONE) 25 MG tablet (Taking)    Sig: TAKE 1/2 TABLET EVERY DAY    metoprolol tartrate (LOPRESSOR) 25 MG tablet (Taking)    Sig - Route: Take 1 tablet by mouth 2 times daily - Oral    atorvastatin (LIPITOR) 40 MG tablet (Taking)    Sig - Route: Take 1 tablet by mouth daily - Oral    sacubitril-valsartan (ENTRESTO) 24-26 MG per tablet (Taking)    Sig - Route: Take 1 tablet by mouth 2 times daily - Oral    Class: Print    ezetimibe (ZETIA) 10 MG tablet    Sig - Route: Take 1 tablet by mouth daily - Oral          Key Antihyperglycemic Medications       Patient is on no antihyperglycemic meds. Past Medical History, Past Surgical History, Family history, Social History, and Medications were all reviewed with the patient today and updated as necessary. Prior to Admission medications    Medication Sig Start Date End Date Taking?  Authorizing Provider   spironolactone (ALDACTONE) 25 MG tablet TAKE 1/2 TABLET EVERY DAY 11/1/23  Yes Roseanne Buchanan MD   metoprolol tartrate (LOPRESSOR) 25 MG tablet Take 1 tablet by mouth 2 times daily 11/1/23  Yes Roseanne Buchanan MD   atorvastatin (LIPITOR) 40 MG tablet Take 1 tablet by mouth daily 11/1/23  Yes Roseanne Buchanan MD   nitroGLYCERIN (NITROSTAT) 0.4 MG SL tablet Place 1 tablet under the tongue every 5 minutes as needed for Chest pain 11/1/23  Yes Roseanne Buchanan MD   albuterol sulfate HFA (VENTOLIN HFA) 108 (90 Base) MCG/ACT inhaler Inhale 2 puffs into the lungs 3 times daily 7/19/23  Yes aJky Murphy APRN - CNP   BiPAP Machine MISC by Does not apply

## 2023-11-01 ENCOUNTER — OFFICE VISIT (OUTPATIENT)
Age: 78
End: 2023-11-01
Payer: MEDICARE

## 2023-11-01 ENCOUNTER — NURSE ONLY (OUTPATIENT)
Age: 78
End: 2023-11-01

## 2023-11-01 VITALS
BODY MASS INDEX: 21.19 KG/M2 | WEIGHT: 148 LBS | SYSTOLIC BLOOD PRESSURE: 96 MMHG | HEIGHT: 70 IN | DIASTOLIC BLOOD PRESSURE: 60 MMHG | HEART RATE: 80 BPM

## 2023-11-01 DIAGNOSIS — I50.22 CHRONIC HFREF (HEART FAILURE WITH REDUCED EJECTION FRACTION) (HCC): Primary | ICD-10-CM

## 2023-11-01 DIAGNOSIS — I50.22 CHRONIC HFREF (HEART FAILURE WITH REDUCED EJECTION FRACTION) (HCC): ICD-10-CM

## 2023-11-01 DIAGNOSIS — I77.89 ENLARGED THORACIC AORTA (HCC): ICD-10-CM

## 2023-11-01 DIAGNOSIS — I25.118 CORONARY ARTERY DISEASE OF NATIVE ARTERY OF NATIVE HEART WITH STABLE ANGINA PECTORIS (HCC): ICD-10-CM

## 2023-11-01 DIAGNOSIS — I25.5 ISCHEMIC CARDIOMYOPATHY: Primary | ICD-10-CM

## 2023-11-01 DIAGNOSIS — Z95.810 BIVENTRICULAR AUTOMATIC IMPLANTABLE CARDIOVERTER DEFIBRILLATOR IN SITU: ICD-10-CM

## 2023-11-01 PROCEDURE — 3078F DIAST BP <80 MM HG: CPT | Performed by: INTERNAL MEDICINE

## 2023-11-01 PROCEDURE — G8420 CALC BMI NORM PARAMETERS: HCPCS | Performed by: INTERNAL MEDICINE

## 2023-11-01 PROCEDURE — 1036F TOBACCO NON-USER: CPT | Performed by: INTERNAL MEDICINE

## 2023-11-01 PROCEDURE — 1123F ACP DISCUSS/DSCN MKR DOCD: CPT | Performed by: INTERNAL MEDICINE

## 2023-11-01 PROCEDURE — G8484 FLU IMMUNIZE NO ADMIN: HCPCS | Performed by: INTERNAL MEDICINE

## 2023-11-01 PROCEDURE — G8427 DOCREV CUR MEDS BY ELIG CLIN: HCPCS | Performed by: INTERNAL MEDICINE

## 2023-11-01 PROCEDURE — 3074F SYST BP LT 130 MM HG: CPT | Performed by: INTERNAL MEDICINE

## 2023-11-01 PROCEDURE — 99214 OFFICE O/P EST MOD 30 MIN: CPT | Performed by: INTERNAL MEDICINE

## 2023-11-01 RX ORDER — ATORVASTATIN CALCIUM 40 MG/1
40 TABLET, FILM COATED ORAL DAILY
Qty: 90 TABLET | Refills: 3 | Status: SHIPPED | OUTPATIENT
Start: 2023-11-01

## 2023-11-01 RX ORDER — SACUBITRIL AND VALSARTAN 24; 26 MG/1; MG/1
1 TABLET, FILM COATED ORAL 2 TIMES DAILY
Qty: 180 TABLET | Refills: 3 | Status: CANCELLED | OUTPATIENT
Start: 2023-11-01

## 2023-11-01 RX ORDER — SPIRONOLACTONE 25 MG/1
TABLET ORAL
Qty: 45 TABLET | Refills: 3 | Status: SHIPPED | OUTPATIENT
Start: 2023-11-01

## 2023-11-01 RX ORDER — NITROGLYCERIN 0.4 MG/1
0.4 TABLET SUBLINGUAL EVERY 5 MIN PRN
Qty: 25 TABLET | Refills: 2 | Status: SHIPPED | OUTPATIENT
Start: 2023-11-01

## 2023-11-01 ASSESSMENT — ENCOUNTER SYMPTOMS: SHORTNESS OF BREATH: 1

## 2023-11-29 PROCEDURE — 93296 REM INTERROG EVL PM/IDS: CPT | Performed by: INTERNAL MEDICINE

## 2023-11-29 PROCEDURE — 93295 DEV INTERROG REMOTE 1/2/MLT: CPT | Performed by: INTERNAL MEDICINE

## 2023-12-12 ENCOUNTER — CLINICAL DOCUMENTATION (OUTPATIENT)
Age: 78
End: 2023-12-12

## 2023-12-12 NOTE — PROGRESS NOTES
Renewal for [facility] patient assistance faxed to [facility], [phone].

## 2023-12-29 ENCOUNTER — TELEPHONE (OUTPATIENT)
Age: 78
End: 2023-12-29

## 2023-12-29 NOTE — TELEPHONE ENCOUNTER
Pt came in for an appt today and asked if he could please be given information regarding patient financial assistance. Please call and advise.

## 2023-12-29 NOTE — TELEPHONE ENCOUNTER
Pt dropped off the rest of his paperwork that goes with his Patient assistance in office 12/29/2023 was handed to [facility]

## 2023-12-29 NOTE — TELEPHONE ENCOUNTER
I spoke with the patient and notified him, Quyen Erazo needs a copy of \"1040\" form to complete his application. He does not file a tax return so he will call them to see what alternative document he can submit.

## 2024-01-03 ENCOUNTER — CLINICAL DOCUMENTATION (OUTPATIENT)
Age: 79
End: 2024-01-03

## 2024-01-04 ENCOUNTER — INITIAL CONSULT (OUTPATIENT)
Age: 79
End: 2024-01-04
Payer: MEDICARE

## 2024-01-04 VITALS
BODY MASS INDEX: 20.76 KG/M2 | HEIGHT: 70 IN | HEART RATE: 118 BPM | WEIGHT: 145 LBS | SYSTOLIC BLOOD PRESSURE: 120 MMHG | DIASTOLIC BLOOD PRESSURE: 74 MMHG

## 2024-01-04 DIAGNOSIS — I50.22 CHRONIC HFREF (HEART FAILURE WITH REDUCED EJECTION FRACTION) (HCC): ICD-10-CM

## 2024-01-04 DIAGNOSIS — J84.10 PULMONARY FIBROSIS (HCC): ICD-10-CM

## 2024-01-04 DIAGNOSIS — I25.5 ISCHEMIC CARDIOMYOPATHY: ICD-10-CM

## 2024-01-04 DIAGNOSIS — I25.118 CORONARY ARTERY DISEASE OF NATIVE ARTERY OF NATIVE HEART WITH STABLE ANGINA PECTORIS (HCC): ICD-10-CM

## 2024-01-04 DIAGNOSIS — I77.89 ENLARGED THORACIC AORTA (HCC): ICD-10-CM

## 2024-01-04 DIAGNOSIS — G47.33 OSA (OBSTRUCTIVE SLEEP APNEA): ICD-10-CM

## 2024-01-04 DIAGNOSIS — Z95.810 BIVENTRICULAR AUTOMATIC IMPLANTABLE CARDIOVERTER DEFIBRILLATOR IN SITU: Primary | ICD-10-CM

## 2024-01-04 DIAGNOSIS — Z95.1 S/P CABG (CORONARY ARTERY BYPASS GRAFT): ICD-10-CM

## 2024-01-04 DIAGNOSIS — N18.31 STAGE 3A CHRONIC KIDNEY DISEASE (CKD) (HCC): ICD-10-CM

## 2024-01-04 PROCEDURE — 1123F ACP DISCUSS/DSCN MKR DOCD: CPT | Performed by: INTERNAL MEDICINE

## 2024-01-04 PROCEDURE — G8420 CALC BMI NORM PARAMETERS: HCPCS | Performed by: INTERNAL MEDICINE

## 2024-01-04 PROCEDURE — G8427 DOCREV CUR MEDS BY ELIG CLIN: HCPCS | Performed by: INTERNAL MEDICINE

## 2024-01-04 PROCEDURE — 1036F TOBACCO NON-USER: CPT | Performed by: INTERNAL MEDICINE

## 2024-01-04 PROCEDURE — 3074F SYST BP LT 130 MM HG: CPT | Performed by: INTERNAL MEDICINE

## 2024-01-04 PROCEDURE — G8484 FLU IMMUNIZE NO ADMIN: HCPCS | Performed by: INTERNAL MEDICINE

## 2024-01-04 PROCEDURE — 93000 ELECTROCARDIOGRAM COMPLETE: CPT | Performed by: INTERNAL MEDICINE

## 2024-01-04 PROCEDURE — 3078F DIAST BP <80 MM HG: CPT | Performed by: INTERNAL MEDICINE

## 2024-01-04 PROCEDURE — 99204 OFFICE O/P NEW MOD 45 MIN: CPT | Performed by: INTERNAL MEDICINE

## 2024-01-04 ASSESSMENT — ENCOUNTER SYMPTOMS
GASTROINTESTINAL NEGATIVE: 1
EYES NEGATIVE: 1
ALLERGIC/IMMUNOLOGIC NEGATIVE: 1
SHORTNESS OF BREATH: 1

## 2024-01-04 NOTE — PROGRESS NOTES
noted in the HPI in addition to:  Review of Systems   Constitutional:  Positive for fatigue.   HENT: Negative.     Eyes: Negative.    Respiratory:  Positive for shortness of breath.    Cardiovascular: Negative.    Gastrointestinal: Negative.    Endocrine: Negative.    Genitourinary: Negative.    Musculoskeletal: Negative.    Skin: Negative.    Allergic/Immunologic: Negative.    Neurological: Negative.    Hematological: Negative.    Psychiatric/Behavioral: Negative.     All other systems reviewed and are negative.    PHYSICAL EXAM:   /74   Pulse (!) 118   Ht 1.778 m (5' 10\")   Wt 65.8 kg (145 lb)   BMI 20.81 kg/m²      Wt Readings from Last 3 Encounters:   01/04/24 65.8 kg (145 lb)   12/29/23 67.1 kg (148 lb)   11/01/23 67.1 kg (148 lb)     BP Readings from Last 3 Encounters:   01/04/24 120/74   12/29/23 110/76   11/01/23 96/60     Gen: Well appearing, well developed, no acute distress  Eyes: Pupils equal, round. Extraocular movements are intact  ENT: Oropharynx clear, no oral lesions, normal dentition  CV: S1S2, regular rate and rhythm, no murmurs, rubs or gallops, normal JVD, no carotid bruits, normal distal pulses, 1+, left sided CIED C/D/I.   Pulm: Clear to auscultation bilaterally, fine rales.   GI: Soft, NT, ND, +BS  Neuro: Alert and oriented, nonfocal  Psych: Appropriate affect  Skin: Normal color and skin turgor  MSK: Normal muscle bulk and tone    Medical problems and test results were reviewed with the patient today.     No results found for any visits on 01/04/24.

## 2024-01-10 NOTE — PROGRESS NOTES
[facility]  [address]  [place] [address]  [phone]    PATIENT ID    Mr. Terry Love  1945  His primary provider is Vinayak Quezada MD    CC: Mr. Love returns to the clinic today for follow up of his obstructive sleep apnea.    INTERVAL HISTORY  Mr. 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  He has extensive past medical history notable for coronary artery disease, heart failure with reduced EF last echo shows EF 30 to 35% in 2020 with global hypokinesis ROS BP estimate 29, history of CABG, rheumatoid Tritus, pulmonary fibrosis, chronic kidney disease stage III neuropathy, recent diagnosis of hydronephrosis status post stenting.  He does have a history of restless leg syndrome by chart review as well as chronic low back pain.  He is here for BiPAP follow-up on settings 10/8 6 cm H2O with a backup rate of 14, he notes his events have been variable sometimes over 20 and he is getting leaking face, he did try a chinstrap and did not have significant leak but did not like the hassle of adding a chinstrap so he is not using this.  He has never tried an oronasal mask.  He is using what sounds like nasal pillows and nasal cushions but notes they are very sensitive to dislodgment.  He denies any morning headaches or daytime sleepiness.  He does note his water reservoir dries out quickly but is not bothered by dry mouth or any other issues.  He thinks he does not snore as noted by his wife.  We did overnight oximetry between last visit and now and had essentially normal oxygenation.  He gets his equipment from [employer].  He has not had any health changes since last visit.  He did get a new machine and is here for a compliance visit.    He was last seen by me in 7/28/2023 and was compliant and getting good clinical

## 2024-01-12 ENCOUNTER — OFFICE VISIT (OUTPATIENT)
Dept: SLEEP MEDICINE | Age: 79
End: 2024-01-12
Payer: MEDICARE

## 2024-01-12 VITALS
RESPIRATION RATE: 16 BRPM | BODY MASS INDEX: 20.93 KG/M2 | HEART RATE: 78 BPM | DIASTOLIC BLOOD PRESSURE: 60 MMHG | SYSTOLIC BLOOD PRESSURE: 122 MMHG | WEIGHT: 146.2 LBS | TEMPERATURE: 97.4 F | HEIGHT: 70 IN | OXYGEN SATURATION: 94 %

## 2024-01-12 DIAGNOSIS — G47.33 OSA (OBSTRUCTIVE SLEEP APNEA): ICD-10-CM

## 2024-01-12 PROCEDURE — G8427 DOCREV CUR MEDS BY ELIG CLIN: HCPCS | Performed by: INTERNAL MEDICINE

## 2024-01-12 PROCEDURE — 3078F DIAST BP <80 MM HG: CPT | Performed by: INTERNAL MEDICINE

## 2024-01-12 PROCEDURE — 99213 OFFICE O/P EST LOW 20 MIN: CPT | Performed by: INTERNAL MEDICINE

## 2024-01-12 PROCEDURE — 1036F TOBACCO NON-USER: CPT | Performed by: INTERNAL MEDICINE

## 2024-01-12 PROCEDURE — 3074F SYST BP LT 130 MM HG: CPT | Performed by: INTERNAL MEDICINE

## 2024-01-12 PROCEDURE — G8420 CALC BMI NORM PARAMETERS: HCPCS | Performed by: INTERNAL MEDICINE

## 2024-01-12 PROCEDURE — 1123F ACP DISCUSS/DSCN MKR DOCD: CPT | Performed by: INTERNAL MEDICINE

## 2024-01-12 PROCEDURE — G8484 FLU IMMUNIZE NO ADMIN: HCPCS | Performed by: INTERNAL MEDICINE

## 2024-01-12 NOTE — ASSESSMENT & PLAN NOTE
Combined obstructive and central sleep apnea, still having a mask leak issue and did not like chinstrap.  We will try to have a mask fitting with [employer], his control is still suboptimal.  We did overnight oximetry that showed essentially normal oxygenation.  I would like him to try a more stable nose mask such as a wisp or Eson type mask or a oronasal mask.  We discussed an in lab repeat titration but he would like to hold off on this for now also still does not want to shave his beard to help with the leak.  If at next visit in 4 months he is still having significant issues we may consider an in lab titration at that point.

## 2024-01-12 NOTE — PATIENT INSTRUCTIONS
Here are some items that we discussed:    1.   We will order a mask fitting with [employer], they should give you a call sometime next week.    2.   We discussed doing a repeat in lab sleep study to find better settings, we may consider this at next visit if we are not making progress.    3.   Call us with any questions or concerns    Yogesh Munoz MD  [phone] okay

## 2024-01-18 NOTE — PROGRESS NOTES
Name:  Terry Love  YOB: 1945   MRN: [id number]      Office Visit: 1/22/2024        ASSESSMENT AND PLAN:  (Medical Decision Making)    Impression: 78 y.o. male     1. Bronchiectasis without complication (HCC)  --traction bronchiectasis bilaterally--has albuterol, but rarely uses it due to lack of perceived benefit.    2. IPF (idiopathic pulmonary fibrosis) (HCC)  --Chronic fibrotic changes in the bases.  Low symptom burden.  Previous discussion with patient about starting Ofev, but due to his minimal symptoms, he does not wish to pursue this at this time.    3. KEVIN (obstructive sleep apnea)  --Remains on BiPAP.  Reports compliance and receiving benefit.  Followed in the sleep clinic.    4. Gastroesophageal reflux disease without esophagitis  --Controlled with omeprazole daily.    Follow-up and Dispositions    Return in about 6 months (around 7/22/2024) for Jeana/IPF, bronchiectasis, spirometry//FI office.     Collaborating physician is Dr. Michael Curtis.    ADS    Jeana Hamilton, APRN - CNP    Alfonso/Kirsten=pulmonary team    _________________________________________________________________________    HISTORY OF PRESENT ILLNESS:    Mr. Terry Love is a 78 y.o. male who is seen at [facility] for  IPF and Bronchiectasis     The patient is a 78 year old male who is seen for follow up of bronchiectasis/IPF  He has a history of CAD, S/P CABG in 2014, ischemic cardiomyopathy (last EF 25%), biV ICD, hyperlipidemia, hypertension, RA, and KEVIN (on BiPAP).  He has never smoked.  HRCT was performed 4/27/23 which revealed scattered areas of fibrosis at the bases and traction bronchiectasis.  Has chronic crackles on exam.  He has albuterol inhaler, but does not use it due to lack of perceived benefit.  He denies chronic mucus production.     CPFTs done 4/24/23 revealed normal spirometry without BD response, mild restriction in lung volumes, and DLCO of 46%.     Has minimal cough and PINK,

## 2024-01-22 ENCOUNTER — OFFICE VISIT (OUTPATIENT)
Dept: PULMONOLOGY | Age: 79
End: 2024-01-22
Payer: MEDICARE

## 2024-01-22 VITALS
SYSTOLIC BLOOD PRESSURE: 120 MMHG | DIASTOLIC BLOOD PRESSURE: 74 MMHG | HEIGHT: 70 IN | HEART RATE: 78 BPM | OXYGEN SATURATION: 96 % | RESPIRATION RATE: 18 BRPM | TEMPERATURE: 97.1 F | WEIGHT: 147.5 LBS | BODY MASS INDEX: 21.11 KG/M2

## 2024-01-22 DIAGNOSIS — G47.33 OSA (OBSTRUCTIVE SLEEP APNEA): ICD-10-CM

## 2024-01-22 DIAGNOSIS — J84.112 IPF (IDIOPATHIC PULMONARY FIBROSIS) (HCC): ICD-10-CM

## 2024-01-22 DIAGNOSIS — K21.9 GASTROESOPHAGEAL REFLUX DISEASE WITHOUT ESOPHAGITIS: ICD-10-CM

## 2024-01-22 DIAGNOSIS — J47.9 BRONCHIECTASIS WITHOUT COMPLICATION (HCC): Primary | ICD-10-CM

## 2024-01-22 PROCEDURE — G8484 FLU IMMUNIZE NO ADMIN: HCPCS | Performed by: NURSE PRACTITIONER

## 2024-01-22 PROCEDURE — 99214 OFFICE O/P EST MOD 30 MIN: CPT | Performed by: NURSE PRACTITIONER

## 2024-01-22 PROCEDURE — 3078F DIAST BP <80 MM HG: CPT | Performed by: NURSE PRACTITIONER

## 2024-01-22 PROCEDURE — 3074F SYST BP LT 130 MM HG: CPT | Performed by: NURSE PRACTITIONER

## 2024-01-22 PROCEDURE — 1123F ACP DISCUSS/DSCN MKR DOCD: CPT | Performed by: NURSE PRACTITIONER

## 2024-01-22 PROCEDURE — 1036F TOBACCO NON-USER: CPT | Performed by: NURSE PRACTITIONER

## 2024-01-22 PROCEDURE — G8427 DOCREV CUR MEDS BY ELIG CLIN: HCPCS | Performed by: NURSE PRACTITIONER

## 2024-01-22 PROCEDURE — G8420 CALC BMI NORM PARAMETERS: HCPCS | Performed by: NURSE PRACTITIONER

## 2024-03-06 ENCOUNTER — TELEPHONE (OUTPATIENT)
Dept: UROLOGY | Age: 79
End: 2024-03-06

## 2024-03-06 ENCOUNTER — OFFICE VISIT (OUTPATIENT)
Dept: UROLOGY | Age: 79
End: 2024-03-06
Payer: MEDICARE

## 2024-03-06 DIAGNOSIS — N18.31 STAGE 3A CHRONIC KIDNEY DISEASE (CKD) (HCC): ICD-10-CM

## 2024-03-06 DIAGNOSIS — N13.5 UPJ (URETEROPELVIC JUNCTION) OBSTRUCTION: ICD-10-CM

## 2024-03-06 DIAGNOSIS — Q62.39 UPJ OBSTRUCTION, CONGENITAL: ICD-10-CM

## 2024-03-06 DIAGNOSIS — N13.30 HYDRONEPHROSIS, UNSPECIFIED HYDRONEPHROSIS TYPE: Primary | ICD-10-CM

## 2024-03-06 DIAGNOSIS — I50.22 CHRONIC HFREF (HEART FAILURE WITH REDUCED EJECTION FRACTION) (HCC): ICD-10-CM

## 2024-03-06 DIAGNOSIS — I25.5 ISCHEMIC CARDIOMYOPATHY: ICD-10-CM

## 2024-03-06 LAB
BILIRUBIN, URINE, POC: NEGATIVE
BLOOD URINE, POC: NEGATIVE
GLUCOSE URINE, POC: NEGATIVE
KETONES, URINE, POC: NEGATIVE
LEUKOCYTE ESTERASE, URINE, POC: NEGATIVE
NITRITE, URINE, POC: NEGATIVE
PH, URINE, POC: 7 (ref 4.6–8)
PROTEIN,URINE, POC: NEGATIVE
SPECIFIC GRAVITY, URINE, POC: 1.01 (ref 1–1.03)
URINALYSIS CLARITY, POC: NORMAL
URINALYSIS COLOR, POC: NORMAL
UROBILINOGEN, POC: NORMAL

## 2024-03-06 PROCEDURE — 81003 URINALYSIS AUTO W/O SCOPE: CPT | Performed by: UROLOGY

## 2024-03-06 PROCEDURE — G8427 DOCREV CUR MEDS BY ELIG CLIN: HCPCS | Performed by: UROLOGY

## 2024-03-06 PROCEDURE — 1036F TOBACCO NON-USER: CPT | Performed by: UROLOGY

## 2024-03-06 PROCEDURE — G8420 CALC BMI NORM PARAMETERS: HCPCS | Performed by: UROLOGY

## 2024-03-06 PROCEDURE — G8484 FLU IMMUNIZE NO ADMIN: HCPCS | Performed by: UROLOGY

## 2024-03-06 PROCEDURE — 99214 OFFICE O/P EST MOD 30 MIN: CPT | Performed by: UROLOGY

## 2024-03-06 PROCEDURE — 1123F ACP DISCUSS/DSCN MKR DOCD: CPT | Performed by: UROLOGY

## 2024-03-06 ASSESSMENT — ENCOUNTER SYMPTOMS
BACK PAIN: 0
NAUSEA: 0

## 2024-03-06 NOTE — TELEPHONE ENCOUNTER
----- Message from Eduard Foster Jr., MD sent at 3/6/2024 10:32 AM EST -----  Needs cystoscopy and left stent exchange in 7-24 (Tria stent)  Remind me to do preop orders.

## 2024-03-06 NOTE — PROGRESS NOTES
[facility]  [address] [place] [id number]  [phone]    Terry Love  : 1945    Chief Complaint   Patient presents with    Follow-up        HPI     Terry Love is a 78 y.o. male with left hydronephrosis, left UPJ obstruction.   Recently had work-up due to shortness of breath.  Chest CT 23: IMPRESSION:  1. Areas of scattered subpleural fibrotic change at the lung bases, along the  lateral left major fissure and in the anterior medial upper lobes bilaterally.  No significant associated groundglass opacities. Areas of traction  bronchiectasis noted in these areas. No significant mucoid impactions, trait or  mass. No pleural effusions.     2. Probable massive left hydronephrosis where imaged through the upper abdomen.  Follow-up with urology suggested.revealed severe left hydronephrosis.  Patient's creatinine is slightly elevated at 1.45.  He was referred here for further evaluation.  We have seen patient in the past due to probable bilateral UPJ obstructions.  Its been over 4 years since his last MAG3 renal scan.  He does have pain in the back.  The the pain can be bilateral in nature but most days it is worse on the left than it is the right.  See previous history as below.     Previous urological history as below: copied from previous chart.      Initially he was Referred by ER., pt went to ER on 19 wth c/o right abdominal and right flank pain.   Had CT A/P w contrast: IMPRESSION:    1) There is perinephric fluid on the right with stranding densities in the  retroperitoneum. Etiology is not clear. 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  2) Additional findings include trace left pleural effusion, evidence of  sternotomy and cardiac pacemaker, moderate size hiatal hernia, moderate  thoracolumbar scoliosis, thickening of the urinary bladder wall and sigmoid  diverticulosis without definite acute inflammatory changes, bilateral

## 2024-03-08 NOTE — TELEPHONE ENCOUNTER
Procedures: Procedure(s):   CYSTOSCOPY, LEFT URETERAL STENT EXCHANGE - TRIA STENT   Date: 7/16/2024   Time: 0800   Location: [facility] MAIN OR 01 CYSTO     Scheduled.

## 2024-04-01 RX ORDER — EZETIMIBE 10 MG/1
10 TABLET ORAL DAILY
Qty: 90 TABLET | Refills: 3 | Status: SHIPPED | OUTPATIENT
Start: 2024-04-01

## 2024-04-01 NOTE — TELEPHONE ENCOUNTER
Requested Prescriptions     Pending Prescriptions Disp Refills    ezetimibe (ZETIA) 10 MG tablet [Pharmacy Med Name: EZETIMIBE 10 MG Tablet] 90 tablet 3     Sig: TAKE 1 TABLET EVERY DAY     Rx verified. Last ov [facility] 1/4/24 Faild 11/1/23.

## 2024-04-30 PROBLEM — I20.89 STABLE ANGINA (HCC): Status: ACTIVE | Noted: 2023-03-29

## 2024-04-30 NOTE — PROGRESS NOTES
[facility]  [address] [place] [address], [address]  [place] [address]  PHONE: [phone]      24    NAME:  Terry Love  : 1945  MRN: [id number]         SUBJECTIVE:   Terry Love is a 78 y.o. male seen for a follow up visit regarding the following:     Chief Complaint   Patient presents with    Congestive Heart Failure     Echo             HPI:  Follow up  Congestive Heart Failure (Echo )   .    Follow up CAD/CABG with ischemic CM, biventricular ICD, hyperlipidemia, hypertension, orthostatic hypotension.  Rheumatoid arthritis. Cath showed severe disease without targets for intervention. Pulmonary fibrosis, follows with pulmonary.  Awaiting cystoscopy and left ureteral stent exchange with Dr Foster. CRT paced.     He feels things are stable.  Has to walk in short bursts both d/t back pain and chronic dyspnea but tries to do so as often as possible.  Recent lipid panel at goal. Needs BMP on ARNI and MRA.        Past cardiac history:   KEVIN - on biPAP   Dec 2014 - LIMA to LAD, SVG to Dx, OM and PDA, EF 50%   Oct 2015 - EF 40-45%, normal valves, RVSP - highest tolerated doses beta blocker, ARB   2016 - EF 40-45%, RVSP 30, mildly dilated RV   Stress MPI with Lexiscan - inferior scar with jorge infarct ischemia, complicated by subdiaphragmatic artifact, EF 48%,   I personally reviewed images, SDS 2, significant subdiaphragmatic artifact , EF unchanged from baseline.     May 2018 - atretic LIMA to patent prox to mid LAD, 95% apical stenosis, patent SVG to OM, Dx and PDA   2018 - EF 25-30%, moderate MR, RVSP 27 (EF down from 40-45%)   Aug 2018 - [place] biventricular ICD   2019- 30-35% mild MR RVSP 23   2021- EF 30-35%, mild MR/TR, RVE with diminished RV function, RVSP 39  2022        EF 30-35%, ascending aorta 4.2 cm, mild/mod MR        NST - fixed inferior defect with small area of reversibility at the apex  2023       LHC - occluded native Dx, RI, Cx and

## 2024-05-01 ENCOUNTER — OFFICE VISIT (OUTPATIENT)
Age: 79
End: 2024-05-01
Payer: MEDICARE

## 2024-05-01 ENCOUNTER — TELEPHONE (OUTPATIENT)
Dept: CARDIOLOGY | Age: 79
End: 2024-05-01

## 2024-05-01 VITALS
HEIGHT: 70 IN | SYSTOLIC BLOOD PRESSURE: 110 MMHG | DIASTOLIC BLOOD PRESSURE: 62 MMHG | HEART RATE: 80 BPM | BODY MASS INDEX: 21.19 KG/M2 | WEIGHT: 148 LBS

## 2024-05-01 DIAGNOSIS — I25.118 CORONARY ARTERY DISEASE OF NATIVE ARTERY OF NATIVE HEART WITH STABLE ANGINA PECTORIS (HCC): ICD-10-CM

## 2024-05-01 DIAGNOSIS — I25.5 ISCHEMIC CARDIOMYOPATHY: ICD-10-CM

## 2024-05-01 DIAGNOSIS — Z95.810 BIVENTRICULAR AUTOMATIC IMPLANTABLE CARDIOVERTER DEFIBRILLATOR IN SITU: ICD-10-CM

## 2024-05-01 DIAGNOSIS — I50.22 CHRONIC HFREF (HEART FAILURE WITH REDUCED EJECTION FRACTION) (HCC): Primary | ICD-10-CM

## 2024-05-01 DIAGNOSIS — I20.89 STABLE ANGINA (HCC): ICD-10-CM

## 2024-05-01 DIAGNOSIS — I77.89 ENLARGED THORACIC AORTA (HCC): ICD-10-CM

## 2024-05-01 DIAGNOSIS — I50.22 CHRONIC HFREF (HEART FAILURE WITH REDUCED EJECTION FRACTION) (HCC): ICD-10-CM

## 2024-05-01 LAB
ANION GAP SERPL CALC-SCNC: 12 MMOL/L (ref 9–18)
BUN SERPL-MCNC: 19 MG/DL (ref 8–23)
CALCIUM SERPL-MCNC: 9.7 MG/DL (ref 8.8–10.2)
CHLORIDE SERPL-SCNC: 104 MMOL/L (ref 98–107)
CO2 SERPL-SCNC: 26 MMOL/L (ref 20–28)
CREAT SERPL-MCNC: 1.2 MG/DL (ref 0.8–1.3)
GLUCOSE SERPL-MCNC: 94 MG/DL (ref 70–99)
POTASSIUM SERPL-SCNC: 6.2 MMOL/L (ref 3.5–5.1)
SODIUM SERPL-SCNC: 142 MMOL/L (ref 136–145)

## 2024-05-01 PROCEDURE — 3074F SYST BP LT 130 MM HG: CPT | Performed by: INTERNAL MEDICINE

## 2024-05-01 PROCEDURE — 3078F DIAST BP <80 MM HG: CPT | Performed by: INTERNAL MEDICINE

## 2024-05-01 PROCEDURE — 99214 OFFICE O/P EST MOD 30 MIN: CPT | Performed by: INTERNAL MEDICINE

## 2024-05-01 PROCEDURE — G8427 DOCREV CUR MEDS BY ELIG CLIN: HCPCS | Performed by: INTERNAL MEDICINE

## 2024-05-01 PROCEDURE — 1123F ACP DISCUSS/DSCN MKR DOCD: CPT | Performed by: INTERNAL MEDICINE

## 2024-05-01 PROCEDURE — G8420 CALC BMI NORM PARAMETERS: HCPCS | Performed by: INTERNAL MEDICINE

## 2024-05-01 PROCEDURE — 1036F TOBACCO NON-USER: CPT | Performed by: INTERNAL MEDICINE

## 2024-05-01 RX ORDER — SPIRONOLACTONE 25 MG/1
TABLET ORAL
Qty: 45 TABLET | Refills: 3 | Status: SHIPPED | OUTPATIENT
Start: 2024-05-01 | End: 2024-05-02 | Stop reason: SINTOL

## 2024-05-01 RX ORDER — EZETIMIBE 10 MG/1
10 TABLET ORAL DAILY
Qty: 90 TABLET | Refills: 3 | Status: SHIPPED | OUTPATIENT
Start: 2024-05-01

## 2024-05-01 RX ORDER — SACUBITRIL AND VALSARTAN 24; 26 MG/1; MG/1
1 TABLET, FILM COATED ORAL 2 TIMES DAILY
Qty: 180 TABLET | Refills: 3 | Status: SHIPPED | OUTPATIENT
Start: 2024-05-01

## 2024-05-01 ASSESSMENT — ENCOUNTER SYMPTOMS
SHORTNESS OF BREATH: 1
BACK PAIN: 1

## 2024-05-01 NOTE — TELEPHONE ENCOUNTER
Spoke with patient after call from paging .  Informed him that his potassium level was found to be severely elevated on lab testing. Recommended he hold spironolactone and Entresto at this time and have someone take him to the ER for treatment. He voiced understanding of recommendation.     No orders of the defined types were placed in this encounter.

## 2024-05-02 ENCOUNTER — HOSPITAL ENCOUNTER (EMERGENCY)
Age: 79
Discharge: HOME | End: 2024-05-02
Attending: STUDENT IN AN ORGANIZED HEALTH CARE EDUCATION/TRAINING PROGRAM
Payer: MEDICARE

## 2024-05-02 ENCOUNTER — TELEPHONE (OUTPATIENT)
Age: 79
End: 2024-05-02

## 2024-05-02 VITALS
WEIGHT: 148 LBS | BODY MASS INDEX: 21.19 KG/M2 | HEART RATE: 74 BPM | DIASTOLIC BLOOD PRESSURE: 101 MMHG | SYSTOLIC BLOOD PRESSURE: 129 MMHG | TEMPERATURE: 98.4 F | HEIGHT: 70 IN | RESPIRATION RATE: 18 BRPM | OXYGEN SATURATION: 99 %

## 2024-05-02 DIAGNOSIS — Z71.1 FEARED CONDITION NOT DEMONSTRATED: Primary | ICD-10-CM

## 2024-05-02 DIAGNOSIS — E87.5 HYPERKALEMIA: Primary | ICD-10-CM

## 2024-05-02 LAB
ANION GAP SERPL CALC-SCNC: 13 MMOL/L (ref 2–11)
BUN SERPL-MCNC: 20 MG/DL (ref 8–23)
CALCIUM SERPL-MCNC: 10 MG/DL (ref 8.3–10.4)
CHLORIDE SERPL-SCNC: 103 MMOL/L (ref 98–107)
CO2 SERPL-SCNC: 23 MMOL/L (ref 21–32)
CREAT SERPL-MCNC: 1.17 MG/DL (ref 0.8–1.5)
ERYTHROCYTE [DISTWIDTH] IN BLOOD BY AUTOMATED COUNT: 13.2 % (ref 11.9–14.6)
GLUCOSE SERPL-MCNC: 98 MG/DL (ref 65–100)
HCT VFR BLD AUTO: 37.9 % (ref 41.1–50.3)
HGB BLD-MCNC: 12.6 G/DL (ref 13.6–17.2)
MCH RBC QN AUTO: 31.9 PG (ref 26.1–32.9)
MCHC RBC AUTO-ENTMCNC: 33.2 G/DL (ref 31.4–35)
MCV RBC AUTO: 95.9 FL (ref 82–102)
NRBC # BLD: 0 K/UL (ref 0–0.2)
PLATELET # BLD AUTO: 151 K/UL (ref 150–450)
PMV BLD AUTO: 8.2 FL (ref 9.4–12.3)
POTASSIUM SERPL-SCNC: 4.8 MMOL/L (ref 3.5–5.1)
RBC # BLD AUTO: 3.95 M/UL (ref 4.23–5.6)
SODIUM SERPL-SCNC: 139 MMOL/L (ref 133–143)
WBC # BLD AUTO: 7.8 K/UL (ref 4.3–11.1)

## 2024-05-02 PROCEDURE — 93005 ELECTROCARDIOGRAM TRACING: CPT | Performed by: STUDENT IN AN ORGANIZED HEALTH CARE EDUCATION/TRAINING PROGRAM

## 2024-05-02 PROCEDURE — 80048 BASIC METABOLIC PNL TOTAL CA: CPT

## 2024-05-02 PROCEDURE — 85027 COMPLETE CBC AUTOMATED: CPT

## 2024-05-02 PROCEDURE — 99284 EMERGENCY DEPT VISIT MOD MDM: CPT

## 2024-05-02 ASSESSMENT — PAIN SCALES - GENERAL: PAINLEVEL_OUTOF10: 0

## 2024-05-02 ASSESSMENT — PAIN - FUNCTIONAL ASSESSMENT: PAIN_FUNCTIONAL_ASSESSMENT: 0-10

## 2024-05-02 NOTE — DISCHARGE INSTRUCTIONS
Your repeat labs appear stable.  You may restart your Entresto and spironolactone as your potassium level is normal.  Please keep follow-up as scheduled with [facility] cardiology

## 2024-05-02 NOTE — ED PROVIDER NOTES
Emergency Department Provider Note       PCP: Vinayak Quezada MD   Age: 78 y.o.   Sex: male     DISPOSITION Decision To Discharge 05/02/2024 10:44:07 AM       ICD-10-CM    1. Feared condition not demonstrated  Z71.1           Medical Decision Making     Very well-appearing male hard of hearing 78-year-old male presenting with abnormal potassium noted on outpatient lab testing.  Unclear if there isThis is associated with a sample.  EKG reassuring, will recheck labs now.  Patient without specific complaint    Potassium level today is stable.  Remainder of labs are stable as well.  Patient safe for discharge, EKG reassuring, will have him restart Entresto and spironolactone as previously  ED Course as of 05/02/24 1505   Thu May 02, 2024   0959 EKG interpretation: Sinus rhythm, rate of 75, leftward axis, poor R wave progression. [BR]      ED Course User Index  [BR] Palomo Gleason R, DO     1 or more acute illnesses that pose a threat to life or bodily function.   Shared medical decision making was utilized in creating the patients health plan today.    I independently ordered and reviewed each unique test.  I reviewed external records: ED visit note from an outside group.  I reviewed external records: provider visit note from PCP.  I reviewed external records: provider visit note from outside specialist.  I reviewed external records: previous lab results from outside ED.     I independently interpreted the cardiac monitor rhythm strip normal sinus rhythm.              History     78-year-old male patient presenting with reports of abnormal potassium noted on outpatient lab test.  Patient states he had routine labs ordered by his primary cardiology provider, and these resulted yesterday with an abnormality of his potassium.  He was sent to this department for recheck of labs.  Patient is without complaint this morning.  He does take Entresto and spironolactone and has been told to hold these medications.  He

## 2024-05-02 NOTE — TELEPHONE ENCOUNTER
I have removed spironolactone from pt's med list, and ordered the BMP    FYI  pt's K+ is 4.8 today       Pt was given information from Dr. Lira, he states understanding and wrote everything down so he would remember.

## 2024-05-02 NOTE — ED NOTES
Patient mobility status  with no difficulty. Provider aware     I have reviewed discharge instructions with the patient.  The patient verbalized understanding.    Patient left ED via Discharge Method: ambulatory to Home with  self. .    Opportunity for questions and clarification provided.     Patient given 0 scripts.

## 2024-05-02 NOTE — TELEPHONE ENCOUNTER
----- Message from Sherley Buchanan MD sent at 5/2/2024  7:27 AM EDT -----  Hyperkalemia.  Stop spironolactone completely.  Hold Entresto 2 days then resume. Please repeat BMP in 1 week, no longer. Stop any exogenous potassium (supplements, MVI's, etc).

## 2024-05-02 NOTE — ED TRIAGE NOTES
Pt arrives for complaints of having elevated K+ on recent labs and was instructed to come in to ED for eval. No symptoms at this time. On aldactone and entresto.

## 2024-05-03 LAB
EKG ATRIAL RATE: 75 BPM
EKG DIAGNOSIS: NORMAL
EKG P AXIS: 90 DEGREES
EKG P-R INTERVAL: 249 MS
EKG Q-T INTERVAL: 417 MS
EKG QRS DURATION: 119 MS
EKG QTC CALCULATION (BAZETT): 466 MS
EKG R AXIS: 264 DEGREES
EKG T AXIS: -3 DEGREES
EKG VENTRICULAR RATE: 75 BPM

## 2024-05-03 PROCEDURE — 93010 ELECTROCARDIOGRAM REPORT: CPT | Performed by: INTERNAL MEDICINE

## 2024-05-08 DIAGNOSIS — E87.5 HYPERKALEMIA: ICD-10-CM

## 2024-05-08 LAB
ANION GAP SERPL CALC-SCNC: 11 MMOL/L (ref 9–18)
BUN SERPL-MCNC: 17 MG/DL (ref 8–23)
CALCIUM SERPL-MCNC: 9.3 MG/DL (ref 8.8–10.2)
CHLORIDE SERPL-SCNC: 106 MMOL/L (ref 98–107)
CO2 SERPL-SCNC: 24 MMOL/L (ref 20–28)
CREAT SERPL-MCNC: 1.07 MG/DL (ref 0.8–1.3)
GLUCOSE SERPL-MCNC: 91 MG/DL (ref 70–99)
POTASSIUM SERPL-SCNC: 4.4 MMOL/L (ref 3.5–5.1)
SODIUM SERPL-SCNC: 140 MMOL/L (ref 136–145)

## 2024-07-09 ENCOUNTER — HOSPITAL ENCOUNTER (OUTPATIENT)
Dept: SURGERY | Age: 79
Discharge: HOME | End: 2024-07-12
Payer: MEDICARE

## 2024-07-09 VITALS
BODY MASS INDEX: 22.16 KG/M2 | RESPIRATION RATE: 16 BRPM | SYSTOLIC BLOOD PRESSURE: 110 MMHG | OXYGEN SATURATION: 96 % | WEIGHT: 149.6 LBS | DIASTOLIC BLOOD PRESSURE: 85 MMHG | HEIGHT: 69 IN | HEART RATE: 75 BPM | TEMPERATURE: 96.8 F

## 2024-07-09 LAB
APPEARANCE UR: CLEAR
BILIRUB UR QL: NEGATIVE
COLOR UR: NORMAL
GLUCOSE UR STRIP.AUTO-MCNC: NEGATIVE MG/DL
HGB BLD-MCNC: 12.1 G/DL (ref 13.6–17.2)
HGB UR QL STRIP: NEGATIVE
KETONES UR QL STRIP.AUTO: NEGATIVE MG/DL
LEUKOCYTE ESTERASE UR QL STRIP.AUTO: NEGATIVE
NITRITE UR QL STRIP.AUTO: NEGATIVE
PH UR STRIP: 6 (ref 5–9)
PROT UR STRIP-MCNC: NEGATIVE MG/DL
SP GR UR REFRACTOMETRY: 1.01 (ref 1–1.02)
UROBILINOGEN UR QL STRIP.AUTO: 0.2 EU/DL (ref 0.2–1)

## 2024-07-09 PROCEDURE — 81003 URINALYSIS AUTO W/O SCOPE: CPT

## 2024-07-09 PROCEDURE — 85018 HEMOGLOBIN: CPT

## 2024-07-09 NOTE — PROGRESS NOTES
Labs within anesthesia guidelines, no follow-up required. Labs automatically routed to ordering provider via Epic documentation.

## 2024-07-09 NOTE — PROGRESS NOTES
Patient verified name and     Order for consent NOT found in EHR; patient verified.     Type 1B surgery, walk-in assessment complete.    Labs per surgeon: UA; results pending  Labs per anesthesia protocol: HgB; results pending  EK2024- WNL, per anesthesia protocol    Patient provided with and instructed on educational handouts including Guide to Surgery, Preventing Surgical Site Infections, Pain Management, and [facility] Brochure.    Patient answered medical/surgical history questions at their best of ability. All prior to admission medications documented in EPIC. Original medication prescription bottles NOT visualized during patient appointment.     Patient instructed to hold all vitamins 7 days prior to surgery and NSAIDS 5 days prior to surgery, patient verbalized understanding.     Patient teach back successful and patient demonstrates knowledge of instructions.     During assessment the patient was found to have a pacemaker/ICD ([employer]) and a surgery that is either positioned prone or lateral and has an ICD or is ipsilateral, upper extremity, or a chest surgery. This RN will obtain the following and place on chart for review per anesthesia protocol: last cardiology visit, most recent pacer interrogation, EKG, ECHO, Stress Test, Cath report.     Patient states that he is unsure to the question \"Are you pacer dependent?\"    Patient states they have NOT been shocked in the past month.     Anesthesia to review the responses on this note and advise if a representative will be needed on the day of surgery.  Chart sent to anesthesia for review and flagged for charge nurse follow up.     PLEASE CONTINUE TAKING ALL PRESCRIPTION MEDICATIONS UP TO THE DAY OF SURGERY UNLESS OTHERWISE DIRECTED BELOW. You may take Tylenol, allergy,  and/or indigestion medications.     TAKE ONLY THESE MEDICATIONS ON THE DAY OF SURGERY   Tramadol- if needed  Albuterol inhaler- bring with you  Omeprazole- if

## 2024-07-15 ENCOUNTER — ANESTHESIA EVENT (OUTPATIENT)
Dept: SURGERY | Age: 79
End: 2024-07-15
Payer: MEDICARE

## 2024-07-16 ENCOUNTER — ANESTHESIA (OUTPATIENT)
Dept: SURGERY | Age: 79
End: 2024-07-16
Payer: MEDICARE

## 2024-07-16 ENCOUNTER — HOSPITAL ENCOUNTER (OUTPATIENT)
Age: 79
Setting detail: OUTPATIENT SURGERY
Discharge: HOME | End: 2024-07-16
Attending: UROLOGY | Admitting: UROLOGY
Payer: MEDICARE

## 2024-07-16 VITALS
BODY MASS INDEX: 21.47 KG/M2 | RESPIRATION RATE: 15 BRPM | OXYGEN SATURATION: 98 % | TEMPERATURE: 97.2 F | HEIGHT: 70 IN | SYSTOLIC BLOOD PRESSURE: 117 MMHG | HEART RATE: 84 BPM | WEIGHT: 150 LBS | DIASTOLIC BLOOD PRESSURE: 66 MMHG

## 2024-07-16 PROBLEM — Q62.39 UPJ OBSTRUCTION, CONGENITAL: Status: ACTIVE | Noted: 2024-07-16

## 2024-07-16 PROCEDURE — 6360000002 HC RX W HCPCS: Performed by: UROLOGY

## 2024-07-16 PROCEDURE — 2720000010 HC SURG SUPPLY STERILE: Performed by: UROLOGY

## 2024-07-16 PROCEDURE — C2617 STENT, NON-COR, TEM W/O DEL: HCPCS | Performed by: UROLOGY

## 2024-07-16 PROCEDURE — 6360000002 HC RX W HCPCS: Performed by: NURSE ANESTHETIST, CERTIFIED REGISTERED

## 2024-07-16 PROCEDURE — 7100000010 HC PHASE II RECOVERY - FIRST 15 MIN: Performed by: UROLOGY

## 2024-07-16 PROCEDURE — 2500000003 HC RX 250 WO HCPCS: Performed by: NURSE ANESTHETIST, CERTIFIED REGISTERED

## 2024-07-16 PROCEDURE — C1769 GUIDE WIRE: HCPCS | Performed by: UROLOGY

## 2024-07-16 PROCEDURE — 2709999900 HC NON-CHARGEABLE SUPPLY: Performed by: UROLOGY

## 2024-07-16 PROCEDURE — 3700000001 HC ADD 15 MINUTES (ANESTHESIA): Performed by: UROLOGY

## 2024-07-16 PROCEDURE — 7100000001 HC PACU RECOVERY - ADDTL 15 MIN: Performed by: UROLOGY

## 2024-07-16 PROCEDURE — 2580000003 HC RX 258: Performed by: ANESTHESIOLOGY

## 2024-07-16 PROCEDURE — 3600000004 HC SURGERY LEVEL 4 BASE: Performed by: UROLOGY

## 2024-07-16 PROCEDURE — 52332 CYSTOSCOPY AND TREATMENT: CPT | Performed by: UROLOGY

## 2024-07-16 PROCEDURE — 3700000000 HC ANESTHESIA ATTENDED CARE: Performed by: UROLOGY

## 2024-07-16 PROCEDURE — 3600000014 HC SURGERY LEVEL 4 ADDTL 15MIN: Performed by: UROLOGY

## 2024-07-16 PROCEDURE — 6370000000 HC RX 637 (ALT 250 FOR IP): Performed by: ANESTHESIOLOGY

## 2024-07-16 PROCEDURE — 7100000000 HC PACU RECOVERY - FIRST 15 MIN: Performed by: UROLOGY

## 2024-07-16 DEVICE — URETERAL STENT WITH SIDE HOLES 7FX26CM
Type: IMPLANTABLE DEVICE | Site: URETER | Status: FUNCTIONAL
Brand: TRIA™ SOFT

## 2024-07-16 RX ORDER — LIDOCAINE HYDROCHLORIDE 20 MG/ML
INJECTION, SOLUTION EPIDURAL; INFILTRATION; INTRACAUDAL; PERINEURAL PRN
Status: DISCONTINUED | OUTPATIENT
Start: 2024-07-16 | End: 2024-07-16 | Stop reason: SDUPTHER

## 2024-07-16 RX ORDER — PROPOFOL 10 MG/ML
INJECTION, EMULSION INTRAVENOUS PRN
Status: DISCONTINUED | OUTPATIENT
Start: 2024-07-16 | End: 2024-07-16 | Stop reason: SDUPTHER

## 2024-07-16 RX ORDER — OXYCODONE HYDROCHLORIDE 5 MG/1
5 TABLET ORAL
Status: DISCONTINUED | OUTPATIENT
Start: 2024-07-16 | End: 2024-07-16 | Stop reason: HOSPADM

## 2024-07-16 RX ORDER — LIDOCAINE HYDROCHLORIDE 10 MG/ML
1 INJECTION, SOLUTION INFILTRATION; PERINEURAL
Status: DISCONTINUED | OUTPATIENT
Start: 2024-07-16 | End: 2024-07-16 | Stop reason: HOSPADM

## 2024-07-16 RX ORDER — PROCHLORPERAZINE EDISYLATE 5 MG/ML
5 INJECTION INTRAMUSCULAR; INTRAVENOUS
Status: DISCONTINUED | OUTPATIENT
Start: 2024-07-16 | End: 2024-07-16 | Stop reason: HOSPADM

## 2024-07-16 RX ORDER — SODIUM CHLORIDE 0.9 % (FLUSH) 0.9 %
5-40 SYRINGE (ML) INJECTION PRN
Status: DISCONTINUED | OUTPATIENT
Start: 2024-07-16 | End: 2024-07-16 | Stop reason: HOSPADM

## 2024-07-16 RX ORDER — HYDRALAZINE HYDROCHLORIDE 20 MG/ML
10 INJECTION INTRAMUSCULAR; INTRAVENOUS
Status: DISCONTINUED | OUTPATIENT
Start: 2024-07-16 | End: 2024-07-16 | Stop reason: HOSPADM

## 2024-07-16 RX ORDER — ONDANSETRON 2 MG/ML
INJECTION INTRAMUSCULAR; INTRAVENOUS PRN
Status: DISCONTINUED | OUTPATIENT
Start: 2024-07-16 | End: 2024-07-16 | Stop reason: SDUPTHER

## 2024-07-16 RX ORDER — ONDANSETRON 2 MG/ML
4 INJECTION INTRAMUSCULAR; INTRAVENOUS
Status: DISCONTINUED | OUTPATIENT
Start: 2024-07-16 | End: 2024-07-16 | Stop reason: HOSPADM

## 2024-07-16 RX ORDER — NALOXONE HYDROCHLORIDE 0.4 MG/ML
INJECTION, SOLUTION INTRAMUSCULAR; INTRAVENOUS; SUBCUTANEOUS PRN
Status: DISCONTINUED | OUTPATIENT
Start: 2024-07-16 | End: 2024-07-16 | Stop reason: HOSPADM

## 2024-07-16 RX ORDER — PHENYLEPHRINE HYDROCHLORIDE 10 MG/ML
INJECTION INTRAVENOUS PRN
Status: DISCONTINUED | OUTPATIENT
Start: 2024-07-16 | End: 2024-07-16 | Stop reason: SDUPTHER

## 2024-07-16 RX ORDER — LABETALOL HYDROCHLORIDE 5 MG/ML
10 INJECTION, SOLUTION INTRAVENOUS
Status: DISCONTINUED | OUTPATIENT
Start: 2024-07-16 | End: 2024-07-16 | Stop reason: HOSPADM

## 2024-07-16 RX ORDER — ACETAMINOPHEN 500 MG
1000 TABLET ORAL ONCE
Status: COMPLETED | OUTPATIENT
Start: 2024-07-16 | End: 2024-07-16

## 2024-07-16 RX ORDER — DEXAMETHASONE SODIUM PHOSPHATE 4 MG/ML
INJECTION, SOLUTION INTRA-ARTICULAR; INTRALESIONAL; INTRAMUSCULAR; INTRAVENOUS; SOFT TISSUE PRN
Status: DISCONTINUED | OUTPATIENT
Start: 2024-07-16 | End: 2024-07-16 | Stop reason: SDUPTHER

## 2024-07-16 RX ORDER — ETOMIDATE 2 MG/ML
INJECTION INTRAVENOUS PRN
Status: DISCONTINUED | OUTPATIENT
Start: 2024-07-16 | End: 2024-07-16 | Stop reason: SDUPTHER

## 2024-07-16 RX ORDER — SODIUM CHLORIDE, SODIUM LACTATE, POTASSIUM CHLORIDE, CALCIUM CHLORIDE 600; 310; 30; 20 MG/100ML; MG/100ML; MG/100ML; MG/100ML
INJECTION, SOLUTION INTRAVENOUS CONTINUOUS
Status: DISCONTINUED | OUTPATIENT
Start: 2024-07-16 | End: 2024-07-16 | Stop reason: HOSPADM

## 2024-07-16 RX ORDER — MIDAZOLAM HYDROCHLORIDE 2 MG/2ML
2 INJECTION, SOLUTION INTRAMUSCULAR; INTRAVENOUS
Status: DISCONTINUED | OUTPATIENT
Start: 2024-07-16 | End: 2024-07-16 | Stop reason: HOSPADM

## 2024-07-16 RX ORDER — FENTANYL CITRATE 50 UG/ML
100 INJECTION, SOLUTION INTRAMUSCULAR; INTRAVENOUS
Status: DISCONTINUED | OUTPATIENT
Start: 2024-07-16 | End: 2024-07-16 | Stop reason: HOSPADM

## 2024-07-16 RX ORDER — SODIUM CHLORIDE 9 MG/ML
INJECTION, SOLUTION INTRAVENOUS PRN
Status: DISCONTINUED | OUTPATIENT
Start: 2024-07-16 | End: 2024-07-16 | Stop reason: HOSPADM

## 2024-07-16 RX ORDER — EPHEDRINE SULFATE 5 MG/ML
INJECTION INTRAVENOUS PRN
Status: DISCONTINUED | OUTPATIENT
Start: 2024-07-16 | End: 2024-07-16 | Stop reason: SDUPTHER

## 2024-07-16 RX ORDER — HYDROMORPHONE HYDROCHLORIDE 2 MG/ML
0.25 INJECTION, SOLUTION INTRAMUSCULAR; INTRAVENOUS; SUBCUTANEOUS EVERY 5 MIN PRN
Status: DISCONTINUED | OUTPATIENT
Start: 2024-07-16 | End: 2024-07-16 | Stop reason: HOSPADM

## 2024-07-16 RX ORDER — SODIUM CHLORIDE 0.9 % (FLUSH) 0.9 %
5-40 SYRINGE (ML) INJECTION EVERY 12 HOURS SCHEDULED
Status: DISCONTINUED | OUTPATIENT
Start: 2024-07-16 | End: 2024-07-16 | Stop reason: HOSPADM

## 2024-07-16 RX ORDER — HYDROMORPHONE HYDROCHLORIDE 2 MG/ML
0.5 INJECTION, SOLUTION INTRAMUSCULAR; INTRAVENOUS; SUBCUTANEOUS EVERY 5 MIN PRN
Status: DISCONTINUED | OUTPATIENT
Start: 2024-07-16 | End: 2024-07-16 | Stop reason: HOSPADM

## 2024-07-16 RX ORDER — FENTANYL CITRATE 50 UG/ML
INJECTION, SOLUTION INTRAMUSCULAR; INTRAVENOUS PRN
Status: DISCONTINUED | OUTPATIENT
Start: 2024-07-16 | End: 2024-07-16 | Stop reason: SDUPTHER

## 2024-07-16 RX ADMIN — PROPOFOL 60 MG: 10 INJECTION, EMULSION INTRAVENOUS at 07:45

## 2024-07-16 RX ADMIN — EPHEDRINE SULFATE 5 MG: 5 INJECTION INTRAVENOUS at 08:06

## 2024-07-16 RX ADMIN — PHENYLEPHRINE HYDROCHLORIDE 100 MCG: 10 INJECTION INTRAVENOUS at 07:57

## 2024-07-16 RX ADMIN — SODIUM CHLORIDE, POTASSIUM CHLORIDE, SODIUM LACTATE AND CALCIUM CHLORIDE: 600; 310; 30; 20 INJECTION, SOLUTION INTRAVENOUS at 06:45

## 2024-07-16 RX ADMIN — DEXAMETHASONE SODIUM PHOSPHATE 4 MG: 4 INJECTION INTRA-ARTICULAR; INTRALESIONAL; INTRAMUSCULAR; INTRAVENOUS; SOFT TISSUE at 08:14

## 2024-07-16 RX ADMIN — ONDANSETRON 4 MG: 2 INJECTION INTRAMUSCULAR; INTRAVENOUS at 08:14

## 2024-07-16 RX ADMIN — PHENYLEPHRINE HYDROCHLORIDE 100 MCG: 10 INJECTION INTRAVENOUS at 07:48

## 2024-07-16 RX ADMIN — ACETAMINOPHEN 1000 MG: 500 TABLET, FILM COATED ORAL at 06:49

## 2024-07-16 RX ADMIN — PHENYLEPHRINE HYDROCHLORIDE 200 MCG: 10 INJECTION INTRAVENOUS at 08:24

## 2024-07-16 RX ADMIN — FENTANYL CITRATE 25 MCG: 50 INJECTION, SOLUTION INTRAMUSCULAR; INTRAVENOUS at 07:53

## 2024-07-16 RX ADMIN — LIDOCAINE HYDROCHLORIDE 100 MG: 20 INJECTION, SOLUTION EPIDURAL; INFILTRATION; INTRACAUDAL; PERINEURAL at 07:45

## 2024-07-16 RX ADMIN — ETOMIDATE 14 MG: 2 INJECTION, SOLUTION INTRAVENOUS at 07:45

## 2024-07-16 RX ADMIN — Medication 2000 MG: at 07:53

## 2024-07-16 RX ADMIN — EPHEDRINE SULFATE 5 MG: 5 INJECTION INTRAVENOUS at 08:25

## 2024-07-16 ASSESSMENT — PAIN DESCRIPTION - DESCRIPTORS: DESCRIPTORS: ACHING

## 2024-07-16 ASSESSMENT — ENCOUNTER SYMPTOMS
SHORTNESS OF BREATH: 1
DYSPNEA ACTIVITY LEVEL: AFTER AMBULATING 1 FLIGHT OF STAIRS

## 2024-07-16 ASSESSMENT — PAIN - FUNCTIONAL ASSESSMENT: PAIN_FUNCTIONAL_ASSESSMENT: 0-10

## 2024-07-16 NOTE — OP NOTE
[address]  [address]                            OPERATIVE REPORT      PATIENT NAME: KRYSTAL MELENDEZ          : 1945  MED REC NO: [id number]                       ROOM: [facility]  ACCOUNT NO: [id number]                       ADMIT DATE: 2024  PROVIDER: Eduard Foster Jr, MD    DATE OF SERVICE:  2024    PREOPERATIVE DIAGNOSES:  Left ureteropelvic junction obstruction.    POSTOPERATIVE DIAGNOSES:  Left ureteropelvic junction obstruction.    PROCEDURES PERFORMED:  Cystoscopy with left ureteral stent exchange.    SURGEON:  Eduard Foster Jr, MD    ASSISTANT:  None.    ANESTHESIA:  General.    ESTIMATED BLOOD LOSS:  None.    SPECIMENS REMOVED:  None.    INTRAOPERATIVE FINDINGS:  Successful exchange of left ureteral stent using a 7-Cymraes 26 cm Tria stent.     COMPLICATIONS:  None.    Indication, as noted.    IMPLANTS:  Left ureteral stent.    INDICATIONS:  The patient with chronic left ureteropelvic junction obstruction managed with chronic indwelling ureteral stent.  Brought in at this time for stent change.    DESCRIPTION OF PROCEDURE:  The patient was given a general anesthetic, placed in the dorsal lithotomy position.  He was prepped and draped in a sterile fashion.  A 22-Cymraes cystoscope was advanced in the urethra using video camera and 30-degree lens.  The anterior urethra was normal.  Prostate appeared to be status post TURP with some mild residual adenoma within the fossa at the level of the verumontanum.  Scope was passed into the bladder.  There was a Tria stent protruding from the left ureteral orifice.  There were no stones or tumors noted.    Using grasping forceps, we grasped the end of the stent and pulled the end out of the urethral meatus.  A Sensor guidewire was passed up through the lumen of this stent and passed up into the area of the left kidney.  The stent was then removed.    Wire was back-loaded into

## 2024-07-16 NOTE — BRIEF OP NOTE
Brief Postoperative Note      Patient: Terry Love  YOB: 1945  MRN: [id number]    Date of Procedure: 7/16/2024    Pre-Op Diagnosis Codes:     * Hydronephrosis [N13.30]    Post-Op Diagnosis: Same       Procedure(s):  CYSTOSCOPY, LEFT URETERAL STENT EXCHANGE - TRIA STENT Patient has an ICD ([employer])    Surgeon(s):  Nixon Foster Jr., MD    Assistant:  * No surgical staff found *    Anesthesia: General    Estimated Blood Loss (mL): Minimal    Complications: None    Specimens:   * No specimens in log *    Implants:  Implant Name Type Inv. Item Serial No.  Lot No. LRB No. Used Action   STENT URETERAL 7 [id number] CM SOFT MONOFILAMENT TRIA - [id number]  STENT URETERAL 7 FRX26 CM SOFT MONOFILAMENT TRIA  [employer] UROLOGY-WD [id number] Left 1 Implanted         Drains: * No LDAs found *    Findings:  Infection Present At Time Of Surgery (PATOS) (choose all levels that have infection present):  No infection present  Other Findings: see op note    Electronically signed by NIXON FOSTER JR, MD on 7/16/2024 at 8:14 AM

## 2024-07-16 NOTE — ANESTHESIA PRE PROCEDURE
05/08/2024 09:46 AM    K 4.4 05/08/2024 09:46 AM     05/08/2024 09:46 AM    CO2 24 05/08/2024 09:46 AM    BUN 17 05/08/2024 09:46 AM    CREATININE 1.07 05/08/2024 09:46 AM    GFRAA >60 05/12/2022 08:37 AM    AGRATIO 1.1 09/18/2019 07:57 AM    LABGLOM 71 05/08/2024 09:46 AM    LABGLOM 51 03/30/2023 09:40 AM    GLUCOSE 91 05/08/2024 09:46 AM    CALCIUM 9.3 05/08/2024 09:46 AM    BILITOT 0.8 09/18/2019 07:57 AM    ALKPHOS 64 09/18/2019 07:57 AM    AST 20 09/18/2019 07:57 AM    ALT 22 09/18/2019 07:57 AM       POC Tests: No results for input(s): \"POCGLU\", \"POCNA\", \"POCK\", \"POCCL\", \"POCBUN\", \"POCHEMO\", \"POCHCT\" in the last 72 hours.    Coags: No results found for: \"PROTIME\", \"INR\", \"APTT\"    HCG (If Applicable): No results found for: \"PREGTESTUR\", \"PREGSERUM\", \"HCG\", \"HCGQUANT\"     ABGs: No results found for: \"PHART\", \"PO2ART\", \"QOB5XNL\", \"FAB6RSX\", \"BEART\", \"P8FYRJWM\"     Type & Screen (If Applicable):  No results found for: \"LABABO\"    Drug/Infectious Status (If Applicable):  No results found for: \"HIV\", \"HEPCAB\"    COVID-19 Screening (If Applicable):   Lab Results   Component Value Date/Time    COVID19 Performed 02/22/2022 08:05 AM    COVID19 Not Detected 02/22/2022 08:05 AM           Anesthesia Evaluation  Patient summary reviewed and Nursing notes reviewed   no history of anesthetic complications:   Airway: Mallampati: II  TM distance: >3 FB   Neck ROM: full  Mouth opening: > = 3 FB   Dental: normal exam         Pulmonary:normal exam    (+)   shortness of breath: no interval change and chronic,   sleep apnea: on CPAP,       asthma:                            Cardiovascular:  Exercise tolerance: no interval change  (+) hypertension:, pacemaker:, past MI:, CAD:, CABG/stent:, CHF:, PINK: after ambulating 1 flight of stairs and no interval change            Echocardiogram reviewed    Cleared by cardiology     Beta Blocker:  Dose within 24 Hrs      ROS comment: 2023: LHC - occluded native Dx, RI, Cx and RCA. Atretic

## 2024-07-16 NOTE — H&P
Bilateral 7/13/2023     CYSTOSCOPY RETROGRADE PYELOGRAM performed by Eduard Foster Jr., MD at [facility] MAIN OR    FEMUR FRACTURE SURGERY Left 1979    HERNIA REPAIR Right       inguinal, ventral-after bypass    LUMBAR FUSION        PACEMAKER   2018     icd, no shocks     TOTAL KNEE ARTHROPLASTY Left           Current Facility-Administered Medications          Current Outpatient Medications   Medication Sig Dispense Refill    spironolactone (ALDACTONE) 25 MG tablet TAKE 1/2 TABLET EVERY DAY 45 tablet 3    metoprolol tartrate (LOPRESSOR) 25 MG tablet Take 1 tablet by mouth 2 times daily 180 tablet 3    atorvastatin (LIPITOR) 40 MG tablet Take 1 tablet by mouth daily 90 tablet 3    nitroGLYCERIN (NITROSTAT) 0.4 MG SL tablet Place 1 tablet under the tongue every 5 minutes as needed for Chest pain 25 tablet 2    albuterol sulfate HFA (VENTOLIN HFA) 108 (90 Base) MCG/ACT inhaler Inhale 2 puffs into the lungs 3 times daily 3 each 3    BiPAP Machine MISC by Does not apply route        ezetimibe (ZETIA) 10 MG tablet Take 1 tablet by mouth daily 90 tablet 3    omeprazole (PRILOSEC) 40 MG delayed release capsule Take 1 capsule by mouth daily        sacubitril-valsartan (ENTRESTO) 24-26 MG per tablet Take 1 tablet by mouth 2 times daily 180 tablet 3    ascorbic acid (VITAMIN C) 500 MG tablet Take by mouth        aspirin 81 MG EC tablet Take 1 tablet by mouth        leflunomide (ARAVA) 20 MG tablet Take 1 tablet by mouth        traMADol (ULTRAM) 50 MG tablet Take 1 tablet by mouth as needed.          No current facility-administered medications for this visit.               Allergies   Allergen Reactions    Sulfa Antibiotics Swelling       Other reaction(s): unknown      Social History               Socioeconomic History    Marital status:        Spouse name: Not on file    Number of children: Not on file    Years of education: Not on file    Highest education level: Not on file   Occupational History    Not on file   Tobacco

## 2024-07-16 NOTE — ANESTHESIA POSTPROCEDURE EVALUATION
Department of Anesthesiology  Postprocedure Note    Patient: Terry Love  MRN: [id number]  YOB: 1945  Date of evaluation: 7/16/2024    Procedure Summary       Date: 07/16/24 Room / Location: [facility] MAIN OR 01 CYSTO / [facility] MAIN OR    Anesthesia Start: 0735 Anesthesia Stop: 0826    Procedure: CYSTOSCOPY, LEFT URETERAL STENT EXCHANGE - TRIA STENT Patient has an ICD ([employer]) (Left: Ureter) Diagnosis:       Hydronephrosis      (Hydronephrosis [N13.30])    Providers: Eduard Foster Jr., MD Responsible Provider: Patrick Ford DO    Anesthesia Type: general ASA Status: 4            Anesthesia Type: No value filed.    Azra Phase I: Azra Score: 8    Azra Phase II: Azra Score: 10    Anesthesia Post Evaluation    Patient location during evaluation: PACU  Level of consciousness: awake and alert  Airway patency: patent  Nausea & Vomiting: no nausea  Cardiovascular status: hemodynamically stable  Respiratory status: acceptable  Hydration status: euvolemic  Pain management: satisfactory to patient    No notable events documented.

## 2024-07-16 NOTE — DISCHARGE INSTRUCTIONS
make important personal or business decisions  Do not drink alcoholic beverages  If you have not urinated within 8 hours after discharge, and you are experiencing discomfort from urinary retention, please go to the nearest ED.  If you have sleep apnea and have a CPAP machine, please use it for all naps and sleeping.  Please use caution when taking narcotics and any of your home medications that may cause drowsiness.  *  Please give a list of your current medications to your Primary Care Provider.  *  Please update this list whenever your medications are discontinued, doses are      changed, or new medications (including over-the-counter products) are added.  *  Please carry medication information at all times in case of emergency situations.    These are general instructions for a healthy lifestyle:  No smoking/ No tobacco products/ Avoid exposure to second hand smoke  Surgeon General's Warning:  Quitting smoking now greatly reduces serious risk to your health.  Obesity, smoking, and sedentary lifestyle greatly increases your risk for illness  A healthy diet, regular physical exercise & weight monitoring are important for maintaining a healthy lifestyle    You may be retaining fluid if you have a history of heart failure or if you experience any of the following symptoms:  Weight gain of 3 pounds or more overnight or 5 pounds in a week, increased swelling in our hands or feet or shortness of breath while lying flat in bed.  Please call your doctor as soon as you notice any of these symptoms; do not wait until your next office visit.

## 2024-07-18 ENCOUNTER — TELEPHONE (OUTPATIENT)
Dept: UROLOGY | Age: 79
End: 2024-07-18

## 2024-07-24 NOTE — PROGRESS NOTES
Name:  Terry Love  YOB: 1945   MRN: [id number]      Office Visit: 2024       [place] [facility]    Assessment & Plan    (Medical Decision Making)    Impression: 79 y.o. male     1. IPF (idiopathic pulmonary fibrosis) (HCC)  --Is not on anti-fibrotic.  Symptoms seem to be worsening.  Have discussed starting Ofev in the past, but patient declined due to low symptom burden.  If CPFTs are worsening, may need to readdress this.    2. Bronchiectasis without complication (HCC)  --has albuterol which is uses periodically.  Recommended that he use this 2 puffs qid to see if it helps.  States when he uses it he cannot perceive any change in symptoms.    3. Post-nasal drip  --add Astelin NS 1 spray/nostril bid.  - azelastine (ASTELIN) 0.1 % nasal spray; 1 spray by Nasal route 2 times daily  Dispense: 60 mL; Refill: 5    4. Dyspnea on exertion  --this is likely multi-factorial related to deconditioning, ICM, and his underlying lung disease.  Will check CPFTs to look for worsening lung function, but if this is stable, will need to follow up with cardiology.    - AMB POC PLETHYSMOGRAPHY LUNG VOLUMES W/WO AIRWAY RESIST; Future  - AMB POC SPIROMETRY W/BRONCHODILATOR; Future  - AMB POC SPIROMETRY; Future  - AMB POC DIFFUSING CAPACITY; Future    Orders Placed This Encounter   Medications    azelastine (ASTELIN) 0.1 % nasal spray     Si spray by Nasal route 2 times daily     Dispense:  60 mL     Refill:  5     No orders of the defined types were placed in this encounter.    Follow-up and Dispositions    Return in about 6 months (around 2025) for Jeana, IPF, Bronchiectasis, Arrive 15 minutes prior to appt time,  office.     Collaborating physician is Dr. Ramon Hamilton, APRN - NEHA Hamilton/Kirsten=pulmonary team    No problem-specific Assessment & Plan notes found for this encounter.

## 2024-07-25 ENCOUNTER — OFFICE VISIT (OUTPATIENT)
Age: 79
End: 2024-07-25
Payer: MEDICARE

## 2024-07-25 VITALS
RESPIRATION RATE: 18 BRPM | OXYGEN SATURATION: 97 % | BODY MASS INDEX: 21.05 KG/M2 | SYSTOLIC BLOOD PRESSURE: 96 MMHG | DIASTOLIC BLOOD PRESSURE: 58 MMHG | HEIGHT: 70 IN | TEMPERATURE: 97.3 F | HEART RATE: 86 BPM | WEIGHT: 147 LBS

## 2024-07-25 DIAGNOSIS — J84.112 IPF (IDIOPATHIC PULMONARY FIBROSIS) (HCC): Primary | ICD-10-CM

## 2024-07-25 DIAGNOSIS — R09.82 POST-NASAL DRIP: ICD-10-CM

## 2024-07-25 DIAGNOSIS — R06.09 DYSPNEA ON EXERTION: ICD-10-CM

## 2024-07-25 DIAGNOSIS — J47.9 BRONCHIECTASIS WITHOUT COMPLICATION (HCC): ICD-10-CM

## 2024-07-25 PROCEDURE — G8420 CALC BMI NORM PARAMETERS: HCPCS | Performed by: NURSE PRACTITIONER

## 2024-07-25 PROCEDURE — 1036F TOBACCO NON-USER: CPT | Performed by: NURSE PRACTITIONER

## 2024-07-25 PROCEDURE — G8427 DOCREV CUR MEDS BY ELIG CLIN: HCPCS | Performed by: NURSE PRACTITIONER

## 2024-07-25 PROCEDURE — 3078F DIAST BP <80 MM HG: CPT | Performed by: NURSE PRACTITIONER

## 2024-07-25 PROCEDURE — 1123F ACP DISCUSS/DSCN MKR DOCD: CPT | Performed by: NURSE PRACTITIONER

## 2024-07-25 PROCEDURE — G2211 COMPLEX E/M VISIT ADD ON: HCPCS | Performed by: NURSE PRACTITIONER

## 2024-07-25 PROCEDURE — 3074F SYST BP LT 130 MM HG: CPT | Performed by: NURSE PRACTITIONER

## 2024-07-25 PROCEDURE — 99214 OFFICE O/P EST MOD 30 MIN: CPT | Performed by: NURSE PRACTITIONER

## 2024-07-25 RX ORDER — AZELASTINE 1 MG/ML
1 SPRAY, METERED NASAL 2 TIMES DAILY
Qty: 60 ML | Refills: 5 | Status: SHIPPED | OUTPATIENT
Start: 2024-07-25

## 2024-09-02 PROCEDURE — 93296 REM INTERROG EVL PM/IDS: CPT | Performed by: INTERNAL MEDICINE

## 2024-09-02 PROCEDURE — 93295 DEV INTERROG REMOTE 1/2/MLT: CPT | Performed by: INTERNAL MEDICINE

## 2024-09-12 ENCOUNTER — NURSE ONLY (OUTPATIENT)
Age: 79
End: 2024-09-12
Payer: MEDICARE

## 2024-09-12 DIAGNOSIS — R06.09 DYSPNEA ON EXERTION: ICD-10-CM

## 2024-09-12 DIAGNOSIS — J84.112 IPF (IDIOPATHIC PULMONARY FIBROSIS) (HCC): Primary | ICD-10-CM

## 2024-09-12 LAB
FEF25-27, POC: 3.14 L/S
FET, POC: NORMAL
FEV 1 , POC: 2.68 L
FEV1/FVC, POC: NORMAL
FVC, POC: NORMAL
LUNG AGE, POC: NORMAL
PEF, POC: 5.54 L/S

## 2024-09-12 PROCEDURE — 94010 BREATHING CAPACITY TEST: CPT | Performed by: INTERNAL MEDICINE

## 2024-10-02 ENCOUNTER — HOSPITAL ENCOUNTER (OUTPATIENT)
Dept: GENERAL RADIOLOGY | Age: 79
Discharge: HOME | End: 2024-10-05

## 2024-10-05 ENCOUNTER — HOSPITAL ENCOUNTER (OUTPATIENT)
Dept: GENERAL RADIOLOGY | Age: 79
Discharge: HOME | End: 2024-10-08
Payer: MEDICARE

## 2024-10-05 DIAGNOSIS — M47.816 LUMBAR SPONDYLOSIS: ICD-10-CM

## 2024-10-05 PROCEDURE — 72110 X-RAY EXAM L-2 SPINE 4/>VWS: CPT

## 2024-11-01 NOTE — PROGRESS NOTES
[facility] CARDIOLOGY  [address] [place] [address], [address]  [place] [address]  PHONE: [phone]      24    NAME:  Terry Love  : 1945  MRN: [id number]         SUBJECTIVE:   Terry Love is a 79 y.o. male seen for a follow up visit regarding the following:     Chief Complaint   Patient presents with    6 Month Follow-Up    Cardiomyopathy            HPI:  Follow up  6 Month Follow-Up and Cardiomyopathy   .    Follow up CAD/CABG with ischemic CM, biventricular ICD, hyperlipidemia, hypertension, orthostatic hypotension.  Rheumatoid arthritis.   He's had no angina, dyspnea is stable.  Wife just diagnosed with metastatic endometrial cancer (seeing her tomorrow).           Past cardiac history:   KEVIN - on biPAP   Dec 2014 - LIMA to LAD, SVG to Dx, OM and PDA, EF 50%   Oct 2015 - EF 40-45%, normal valves, RVSP - highest tolerated doses beta blocker, ARB   2016 - EF 40-45%, RVSP 30, mildly dilated RV   Stress MPI with Lexiscan - inferior scar with jorge infarct ischemia, complicated by subdiaphragmatic artifact, EF 48%,   I personally reviewed images, SDS 2, significant subdiaphragmatic artifact , EF unchanged from baseline.     May 2018 - atretic LIMA to patent prox to mid LAD, 95% apical stenosis, patent SVG to OM, Dx and PDA   2018 - EF 25-30%, moderate MR, RVSP 27 (EF down from 40-45%)   Aug 2018 - [employer] biventricular ICD   2019- 30-35% mild MR RVSP 23   2021- EF 30-35%, mild MR/TR, RVE with diminished RV function, RVSP 39  2022        EF 30-35%, ascending aorta 4.2 cm, mild/mod MR        NST - fixed inferior defect with small area of reversibility at the apex  2023       [facility] - occluded native Dx, RI, Cx and RCA. Atretic LIMA to LAD but antegrade native LAD flow. Occluded SVG-RCA, moderate SVG-OM disease, patent VG-Dx, EF 25-30%  Dec 2023       Echo - EF 40-45%                     Cardiac Medications       Nitrates       nitroGLYCERIN (NITROSTAT) 0.4 MG SL

## 2024-11-04 ENCOUNTER — NURSE ONLY (OUTPATIENT)
Age: 79
End: 2024-11-04

## 2024-11-04 ENCOUNTER — OFFICE VISIT (OUTPATIENT)
Age: 79
End: 2024-11-04

## 2024-11-04 VITALS
SYSTOLIC BLOOD PRESSURE: 110 MMHG | HEIGHT: 70 IN | WEIGHT: 149 LBS | BODY MASS INDEX: 21.33 KG/M2 | DIASTOLIC BLOOD PRESSURE: 76 MMHG | HEART RATE: 80 BPM

## 2024-11-04 DIAGNOSIS — I50.22 CHRONIC HFREF (HEART FAILURE WITH REDUCED EJECTION FRACTION) (HCC): Primary | ICD-10-CM

## 2024-11-04 DIAGNOSIS — I25.5 ISCHEMIC CARDIOMYOPATHY: ICD-10-CM

## 2024-11-04 DIAGNOSIS — Z95.1 S/P CABG (CORONARY ARTERY BYPASS GRAFT): ICD-10-CM

## 2024-11-04 DIAGNOSIS — Z95.810 BIVENTRICULAR AUTOMATIC IMPLANTABLE CARDIOVERTER DEFIBRILLATOR IN SITU: ICD-10-CM

## 2024-11-04 DIAGNOSIS — I20.89 STABLE ANGINA (HCC): ICD-10-CM

## 2024-11-04 DIAGNOSIS — J84.10 PULMONARY FIBROSIS (HCC): ICD-10-CM

## 2024-11-04 RX ORDER — NITROGLYCERIN 0.4 MG/1
0.4 TABLET SUBLINGUAL EVERY 5 MIN PRN
Qty: 25 TABLET | Refills: 2 | Status: SHIPPED | OUTPATIENT
Start: 2024-11-04

## 2024-11-04 RX ORDER — ATORVASTATIN CALCIUM 40 MG/1
40 TABLET, FILM COATED ORAL NIGHTLY
Qty: 90 TABLET | Refills: 3 | Status: SHIPPED | OUTPATIENT
Start: 2024-11-04

## 2024-11-04 ASSESSMENT — ENCOUNTER SYMPTOMS: SHORTNESS OF BREATH: 1

## 2024-12-02 PROCEDURE — 93295 DEV INTERROG REMOTE 1/2/MLT: CPT | Performed by: INTERNAL MEDICINE

## 2024-12-02 PROCEDURE — 93296 REM INTERROG EVL PM/IDS: CPT | Performed by: INTERNAL MEDICINE

## 2025-01-29 NOTE — PROGRESS NOTES
costophrenic angles. Additional  area of scarring noted along the lateral left major fissure image 17 of series  3. Minimal fibrotic change noted along the anteromedial right and left upper  lobe on images 14 and 18 respectively. No associated groundglass opacity. Mild  bibasilar bronchiectatic changes. No significant mucoid impactions. Prone  imaging shows no evidence of change in the subpleural fibrotic process at the  lung bases.    MEDIASTINUM/AXILLAE: Heart is normal in size. No pericardial effusion. Prior  CABG. Implantable cardiac device left chest and multiple leads appear in good  position right atrium, right ventricle and along the left cardiac border. Large  hiatal hernia. Esophagus is unremarkable. No enlarged lymph nodes.    UPPER ABDOMEN: Colonic interposition. Large cystic area noted in the left upper  quadrant the abdomen measuring 18.9 x 11.8 cm. This may be massive left  hydronephrosis. Right kidney is unremarkable where imaged. Renal cortical  thinning noted in the visible portion of the left renal cortex.    MUSCULOSKELETAL: Degenerative levoscoliosis lower thoracic and lumbar spine.    Impression  1. Areas of scattered subpleural fibrotic change at the lung bases, along the  lateral left major fissure and in the anterior medial upper lobes bilaterally.  No significant associated groundglass opacities. Areas of traction  bronchiectasis noted in these areas. No significant mucoid impactions, trait or  mass. No pleural effusions.    2. Probable massive left hydronephrosis where imaged through the upper abdomen.  Follow-up with urology suggested.    Nuclear Medicine:   NM KIDNEY W FLOW AND FUNCTION W PHARMACOLOGICAL INTERVENTION 06/09/2023    Narrative  NUCLEAR MEDICINE RENOGRAM STUDY.    INDICATION: UPJ obstruction with severe left-sided nephrosis    RADIOPHARMACEUTICAL:  8.0 mCi Tc99m MAG-3 IV.    COMPARISON: October 2019    TECHNIQUE: Flow images, renal time activity curves were performed.  40 mg

## 2025-01-30 ENCOUNTER — OFFICE VISIT (OUTPATIENT)
Age: 80
End: 2025-01-30
Payer: MEDICARE

## 2025-01-30 VITALS
HEART RATE: 83 BPM | RESPIRATION RATE: 18 BRPM | TEMPERATURE: 97.2 F | SYSTOLIC BLOOD PRESSURE: 98 MMHG | WEIGHT: 151.2 LBS | DIASTOLIC BLOOD PRESSURE: 70 MMHG | HEIGHT: 62 IN | OXYGEN SATURATION: 99 % | BODY MASS INDEX: 27.82 KG/M2

## 2025-01-30 DIAGNOSIS — M06.9 RHEUMATOID ARTHRITIS, INVOLVING UNSPECIFIED SITE, UNSPECIFIED WHETHER RHEUMATOID FACTOR PRESENT (HCC): ICD-10-CM

## 2025-01-30 DIAGNOSIS — J84.112 IPF (IDIOPATHIC PULMONARY FIBROSIS) (HCC): ICD-10-CM

## 2025-01-30 DIAGNOSIS — J47.9 BRONCHIECTASIS WITHOUT COMPLICATION (HCC): Primary | ICD-10-CM

## 2025-01-30 DIAGNOSIS — R06.09 DYSPNEA ON EXERTION: ICD-10-CM

## 2025-01-30 PROCEDURE — G8419 CALC BMI OUT NRM PARAM NOF/U: HCPCS | Performed by: NURSE PRACTITIONER

## 2025-01-30 PROCEDURE — G8427 DOCREV CUR MEDS BY ELIG CLIN: HCPCS | Performed by: NURSE PRACTITIONER

## 2025-01-30 PROCEDURE — 1036F TOBACCO NON-USER: CPT | Performed by: NURSE PRACTITIONER

## 2025-01-30 PROCEDURE — 1160F RVW MEDS BY RX/DR IN RCRD: CPT | Performed by: NURSE PRACTITIONER

## 2025-01-30 PROCEDURE — G2211 COMPLEX E/M VISIT ADD ON: HCPCS | Performed by: NURSE PRACTITIONER

## 2025-01-30 PROCEDURE — 1126F AMNT PAIN NOTED NONE PRSNT: CPT | Performed by: NURSE PRACTITIONER

## 2025-01-30 PROCEDURE — 3078F DIAST BP <80 MM HG: CPT | Performed by: NURSE PRACTITIONER

## 2025-01-30 PROCEDURE — 1123F ACP DISCUSS/DSCN MKR DOCD: CPT | Performed by: NURSE PRACTITIONER

## 2025-01-30 PROCEDURE — 99214 OFFICE O/P EST MOD 30 MIN: CPT | Performed by: NURSE PRACTITIONER

## 2025-01-30 PROCEDURE — 1159F MED LIST DOCD IN RCRD: CPT | Performed by: NURSE PRACTITIONER

## 2025-01-30 PROCEDURE — 3074F SYST BP LT 130 MM HG: CPT | Performed by: NURSE PRACTITIONER

## 2025-01-30 RX ORDER — ALBUTEROL SULFATE 90 UG/1
2 INHALANT RESPIRATORY (INHALATION) 3 TIMES DAILY
Qty: 3 EACH | Refills: 3 | Status: SHIPPED | OUTPATIENT
Start: 2025-01-30

## 2025-03-03 PROCEDURE — 93295 DEV INTERROG REMOTE 1/2/MLT: CPT | Performed by: INTERNAL MEDICINE

## 2025-03-03 PROCEDURE — 93296 REM INTERROG EVL PM/IDS: CPT | Performed by: INTERNAL MEDICINE

## 2025-03-18 ENCOUNTER — HOSPITAL ENCOUNTER (EMERGENCY)
Age: 80
Discharge: ANOTHER HOSPITAL | End: 2025-03-18
Payer: MEDICARE

## 2025-03-18 ENCOUNTER — APPOINTMENT (OUTPATIENT)
Dept: CT IMAGING | Age: 80
End: 2025-03-18
Payer: MEDICARE

## 2025-03-18 ENCOUNTER — HOSPITAL ENCOUNTER (OUTPATIENT)
Age: 80
Setting detail: OBSERVATION
LOS: 1 days | Discharge: HOME | End: 2025-03-19
Payer: MEDICARE

## 2025-03-18 VITALS
SYSTOLIC BLOOD PRESSURE: 123 MMHG | TEMPERATURE: 97.5 F | DIASTOLIC BLOOD PRESSURE: 69 MMHG | WEIGHT: 151 LBS | HEIGHT: 62 IN | OXYGEN SATURATION: 96 % | RESPIRATION RATE: 16 BRPM | HEART RATE: 73 BPM | BODY MASS INDEX: 27.79 KG/M2

## 2025-03-18 DIAGNOSIS — R20.0 NUMBNESS: ICD-10-CM

## 2025-03-18 DIAGNOSIS — G45.9 TIA (TRANSIENT ISCHEMIC ATTACK): Primary | ICD-10-CM

## 2025-03-18 DIAGNOSIS — I65.21 STENOSIS OF RIGHT CAROTID ARTERY: ICD-10-CM

## 2025-03-18 DIAGNOSIS — I63.9 CEREBROVASCULAR ACCIDENT (CVA), UNSPECIFIED MECHANISM (HCC): ICD-10-CM

## 2025-03-18 LAB
ALBUMIN SERPL-MCNC: 4.1 G/DL (ref 3.2–4.6)
ALBUMIN/GLOB SERPL: 1.5 (ref 1–1.9)
ALP SERPL-CCNC: 65 U/L (ref 40–129)
ALT SERPL-CCNC: 15 U/L (ref 12–65)
ANION GAP SERPL CALC-SCNC: 11 MMOL/L (ref 7–16)
AST SERPL-CCNC: 23 U/L (ref 15–37)
BASOPHILS # BLD: 0.06 K/UL (ref 0–0.2)
BASOPHILS NFR BLD: 1 % (ref 0–2)
BILIRUB SERPL-MCNC: 0.4 MG/DL (ref 0–1.2)
BUN SERPL-MCNC: 23 MG/DL (ref 8–23)
CALCIUM SERPL-MCNC: 9.3 MG/DL (ref 8.8–10.2)
CHLORIDE SERPL-SCNC: 104 MMOL/L (ref 98–107)
CO2 SERPL-SCNC: 24 MMOL/L (ref 20–29)
CREAT SERPL-MCNC: 1.14 MG/DL (ref 0.8–1.3)
DIFFERENTIAL METHOD BLD: ABNORMAL
EKG ATRIAL RATE: 75 BPM
EKG DIAGNOSIS: NORMAL
EKG P-R INTERVAL: 168 MS
EKG Q-T INTERVAL: 480 MS
EKG QRS DURATION: 184 MS
EKG QTC CALCULATION (BAZETT): 537 MS
EKG R AXIS: 258 DEGREES
EKG T AXIS: 24 DEGREES
EKG VENTRICULAR RATE: 75 BPM
EOSINOPHIL # BLD: 0.44 K/UL (ref 0–0.8)
EOSINOPHIL NFR BLD: 7.3 % (ref 0.5–7.8)
ERYTHROCYTE [DISTWIDTH] IN BLOOD BY AUTOMATED COUNT: 12.8 % (ref 11.9–14.6)
GLOBULIN SER CALC-MCNC: 2.7 G/DL (ref 2.3–3.5)
GLUCOSE SERPL-MCNC: 105 MG/DL (ref 65–100)
HCT VFR BLD AUTO: 37.1 % (ref 41.1–50.3)
HGB BLD-MCNC: 12 G/DL (ref 13.6–17.2)
IMM GRANULOCYTES # BLD AUTO: 0.02 K/UL (ref 0–0.5)
IMM GRANULOCYTES NFR BLD AUTO: 0.3 % (ref 0–5)
INR PPP: 1.1
LYMPHOCYTES # BLD: 1.67 K/UL (ref 0.5–4.6)
LYMPHOCYTES NFR BLD: 27.8 % (ref 13–44)
MCH RBC QN AUTO: 31.6 PG (ref 26.1–32.9)
MCHC RBC AUTO-ENTMCNC: 32.3 G/DL (ref 31.4–35)
MCV RBC AUTO: 97.6 FL (ref 82–102)
MONOCYTES # BLD: 0.7 K/UL (ref 0.1–1.3)
MONOCYTES NFR BLD: 11.6 % (ref 4–12)
NEUTS SEG # BLD: 3.12 K/UL (ref 1.7–8.2)
NEUTS SEG NFR BLD: 52 % (ref 43–78)
NRBC # BLD: 0 K/UL (ref 0–0.2)
PLATELET # BLD AUTO: 147 K/UL (ref 150–450)
PMV BLD AUTO: 8.3 FL (ref 9.4–12.3)
POTASSIUM SERPL-SCNC: 4.4 MMOL/L (ref 3.5–5.1)
PROT SERPL-MCNC: 6.8 G/DL (ref 6.3–8.2)
PROTHROMBIN TIME: 14.1 SEC (ref 11.3–14.9)
RBC # BLD AUTO: 3.8 M/UL (ref 4.23–5.6)
SODIUM SERPL-SCNC: 139 MMOL/L (ref 133–143)
TROPONIN T SERPL HS-MCNC: 21.8 NG/L (ref 0–22)
WBC # BLD AUTO: 6 K/UL (ref 4.3–11.1)

## 2025-03-18 PROCEDURE — 6370000000 HC RX 637 (ALT 250 FOR IP)

## 2025-03-18 PROCEDURE — 93005 ELECTROCARDIOGRAM TRACING: CPT

## 2025-03-18 PROCEDURE — G0378 HOSPITAL OBSERVATION PER HR: HCPCS

## 2025-03-18 PROCEDURE — 70450 CT HEAD/BRAIN W/O DYE: CPT

## 2025-03-18 PROCEDURE — 70496 CT ANGIOGRAPHY HEAD: CPT

## 2025-03-18 PROCEDURE — 93010 ELECTROCARDIOGRAM REPORT: CPT | Performed by: INTERNAL MEDICINE

## 2025-03-18 PROCEDURE — 6360000004 HC RX CONTRAST MEDICATION

## 2025-03-18 PROCEDURE — 6370000000 HC RX 637 (ALT 250 FOR IP): Performed by: STUDENT IN AN ORGANIZED HEALTH CARE EDUCATION/TRAINING PROGRAM

## 2025-03-18 PROCEDURE — 2500000003 HC RX 250 WO HCPCS: Performed by: STUDENT IN AN ORGANIZED HEALTH CARE EDUCATION/TRAINING PROGRAM

## 2025-03-18 PROCEDURE — 84484 ASSAY OF TROPONIN QUANT: CPT

## 2025-03-18 PROCEDURE — 99285 EMERGENCY DEPT VISIT HI MDM: CPT

## 2025-03-18 PROCEDURE — 80053 COMPREHEN METABOLIC PANEL: CPT

## 2025-03-18 PROCEDURE — 85025 COMPLETE CBC W/AUTO DIFF WBC: CPT

## 2025-03-18 PROCEDURE — 85610 PROTHROMBIN TIME: CPT

## 2025-03-18 RX ORDER — ENOXAPARIN SODIUM 100 MG/ML
40 INJECTION SUBCUTANEOUS DAILY
Status: DISCONTINUED | OUTPATIENT
Start: 2025-03-19 | End: 2025-03-19 | Stop reason: HOSPADM

## 2025-03-18 RX ORDER — ATORVASTATIN CALCIUM 80 MG/1
80 TABLET, FILM COATED ORAL NIGHTLY
Status: DISCONTINUED | OUTPATIENT
Start: 2025-03-18 | End: 2025-03-19 | Stop reason: HOSPADM

## 2025-03-18 RX ORDER — POLYETHYLENE GLYCOL 3350 17 G/17G
17 POWDER, FOR SOLUTION ORAL DAILY PRN
Status: DISCONTINUED | OUTPATIENT
Start: 2025-03-18 | End: 2025-03-19 | Stop reason: HOSPADM

## 2025-03-18 RX ORDER — CLOPIDOGREL BISULFATE 75 MG/1
300 TABLET ORAL ONCE
Status: COMPLETED | OUTPATIENT
Start: 2025-03-18 | End: 2025-03-18

## 2025-03-18 RX ORDER — ONDANSETRON 2 MG/ML
4 INJECTION INTRAMUSCULAR; INTRAVENOUS EVERY 6 HOURS PRN
Status: DISCONTINUED | OUTPATIENT
Start: 2025-03-18 | End: 2025-03-19 | Stop reason: HOSPADM

## 2025-03-18 RX ORDER — TRAMADOL HYDROCHLORIDE 50 MG/1
50 TABLET ORAL EVERY 6 HOURS PRN
Status: DISCONTINUED | OUTPATIENT
Start: 2025-03-18 | End: 2025-03-19 | Stop reason: HOSPADM

## 2025-03-18 RX ORDER — ASPIRIN 81 MG/1
81 TABLET ORAL DAILY
Status: DISCONTINUED | OUTPATIENT
Start: 2025-03-19 | End: 2025-03-19 | Stop reason: HOSPADM

## 2025-03-18 RX ORDER — SODIUM CHLORIDE 0.9 % (FLUSH) 0.9 %
5-40 SYRINGE (ML) INJECTION PRN
Status: DISCONTINUED | OUTPATIENT
Start: 2025-03-18 | End: 2025-03-19 | Stop reason: HOSPADM

## 2025-03-18 RX ORDER — EZETIMIBE 10 MG/1
10 TABLET ORAL DAILY
Status: DISCONTINUED | OUTPATIENT
Start: 2025-03-19 | End: 2025-03-19 | Stop reason: HOSPADM

## 2025-03-18 RX ORDER — METOPROLOL TARTRATE 25 MG/1
25 TABLET, FILM COATED ORAL 2 TIMES DAILY
Status: DISCONTINUED | OUTPATIENT
Start: 2025-03-18 | End: 2025-03-19 | Stop reason: HOSPADM

## 2025-03-18 RX ORDER — SODIUM CHLORIDE 0.9 % (FLUSH) 0.9 %
5-40 SYRINGE (ML) INJECTION EVERY 12 HOURS SCHEDULED
Status: DISCONTINUED | OUTPATIENT
Start: 2025-03-18 | End: 2025-03-19 | Stop reason: HOSPADM

## 2025-03-18 RX ORDER — LEFLUNOMIDE 20 MG/1
20 TABLET ORAL NIGHTLY
Status: DISCONTINUED | OUTPATIENT
Start: 2025-03-18 | End: 2025-03-19 | Stop reason: HOSPADM

## 2025-03-18 RX ORDER — SODIUM CHLORIDE 9 MG/ML
INJECTION, SOLUTION INTRAVENOUS PRN
Status: DISCONTINUED | OUTPATIENT
Start: 2025-03-18 | End: 2025-03-19 | Stop reason: HOSPADM

## 2025-03-18 RX ORDER — IOPAMIDOL 755 MG/ML
100 INJECTION, SOLUTION INTRAVASCULAR
Status: COMPLETED | OUTPATIENT
Start: 2025-03-18 | End: 2025-03-18

## 2025-03-18 RX ORDER — ONDANSETRON 4 MG/1
4 TABLET, ORALLY DISINTEGRATING ORAL EVERY 8 HOURS PRN
Status: DISCONTINUED | OUTPATIENT
Start: 2025-03-18 | End: 2025-03-19 | Stop reason: HOSPADM

## 2025-03-18 RX ORDER — CLOPIDOGREL BISULFATE 75 MG/1
75 TABLET ORAL
Status: DISCONTINUED | OUTPATIENT
Start: 2025-03-18 | End: 2025-03-19 | Stop reason: HOSPADM

## 2025-03-18 RX ADMIN — CLOPIDOGREL BISULFATE 300 MG: 75 TABLET ORAL at 15:09

## 2025-03-18 RX ADMIN — CLOPIDOGREL BISULFATE 75 MG: 75 TABLET ORAL at 21:30

## 2025-03-18 RX ADMIN — ATORVASTATIN CALCIUM 80 MG: 80 TABLET, FILM COATED ORAL at 21:30

## 2025-03-18 RX ADMIN — IOPAMIDOL 100 ML: 755 INJECTION, SOLUTION INTRAVENOUS at 13:26

## 2025-03-18 RX ADMIN — METOPROLOL TARTRATE 25 MG: 25 TABLET, FILM COATED ORAL at 21:30

## 2025-03-18 RX ADMIN — SODIUM CHLORIDE, PRESERVATIVE FREE 10 ML: 5 INJECTION INTRAVENOUS at 21:30

## 2025-03-18 RX ADMIN — LEFLUNOMIDE 20 MG: 20 TABLET ORAL at 21:30

## 2025-03-18 RX ADMIN — SACUBITRIL AND VALSARTAN 1 TABLET: 24; 26 TABLET, FILM COATED ORAL at 21:30

## 2025-03-18 ASSESSMENT — PAIN SCALES - GENERAL: PAINLEVEL_OUTOF10: 0

## 2025-03-18 ASSESSMENT — LIFESTYLE VARIABLES
HOW OFTEN DO YOU HAVE A DRINK CONTAINING ALCOHOL: NEVER
HOW MANY STANDARD DRINKS CONTAINING ALCOHOL DO YOU HAVE ON A TYPICAL DAY: PATIENT DOES NOT DRINK

## 2025-03-18 ASSESSMENT — PAIN - FUNCTIONAL ASSESSMENT: PAIN_FUNCTIONAL_ASSESSMENT: NONE - DENIES PAIN

## 2025-03-18 NOTE — ED NOTES
Report called to Meli Nicholas RN at [facility] on 7th floor.  #18 jelco via the left AC intact without redness or swelling.  NIHSS remains at 0

## 2025-03-18 NOTE — ED NOTES
Terry Love  : 1945  MRN: [id number]  ConnectID: [id number]  REASON:  Code Stroke TLKW less than 4.5 hours  ACUITY:  Code Stroke TLKW <4.5  SUBMITTED:  2025 13:22 EDT

## 2025-03-18 NOTE — ED TRIAGE NOTES
Patient ambulatory in ED with complaint of left arm issues. Patient states it feels half numb and he cannot do much with it. Patient states he does have left sided facial drooping at baseline. Symptoms started about an hour ago. Patient states he suddenly lost control of his hand and wasn't doing anything special when this occurred.

## 2025-03-18 NOTE — ED PROVIDER NOTES
feeling as if it was   someone else's hand.  Lasted for about an hour before resolving.    Takes aspirin at home.       Exam: Pertinent positives and negatives include:  Awake, alert, and oriented. No language deficits or dysarthria.    No involuntary abnormal saccades or nystagmus.  No facial   asymmetry. Moving all extremities spontaneously and with purpose.    No abnormal involuntary movements and muscle tone is normal   throughout. Sensation is intact to light touch.     Imaging and review of data:   CT head with vague hypodensity around the primary sensory cortex.       CTA ~60% proximal right ICA stenosis      Assessment and Plan:   79-year-old male with transient loss of sensation in the left   hand and arm x1 hour, concerning for TIA versus acute ischemic   stroke. With abnormal CTA as above, this is concerning for a   symptomatic right ICA stenosis.      Plan:  Agree with vascular surgery evaluation.      Cardiology also contacted to interrogate his ICD for arrhythmias,   and to determine whether his ICD is MRI compatible.    Continue dual antiplatelets.  Continue statin at prior home dose,   LDL is at goal.    -180, DBP <100, MAPs >65.  Long-term goal <130/80. Gradual   adjustments with PO agents preferred over rapid-acting IV, if   needed to control.     Completion of workup with TTE.     Counseled on adherence to AHA's Life's Essential 8.      Palomo Hightower,   Neurology      Cumulative time spent today was 45 minutes which included chart   review, obtaining history (from patient, family, or other   providers), review of images, examining the patient, and   counseling the patient and/or family on medical condition.      Inpatient consult to Vascular Surgery    Narrative    David Melgar MD     3/20/2025  7:34 AM          History and Physical/Surgical Consult   Terry Love    Admit date: 3/18/2025    MRN: [id number]     : 1945     Age: 79 y.o.          3/19/2025 12:28  minutes of time was spent on this encounter with greater than   50% of time in direct patient contact including patient   education, counseling, review of medical history and imaging, and   medical decision making.       Liya NAY Standard, APRN - CNP   EKG 12 Lead   Result Value Ref Range    Ventricular Rate 75 BPM    Atrial Rate 75 BPM    P-R Interval 224 ms    QRS Duration 126 ms    Q-T Interval 422 ms    QTc Calculation (Bazett) 471 ms    P Axis 15 degrees    R Axis 251 degrees    T Axis 18 degrees    Diagnosis       AV dual-paced rhythm with prolonged AV conduction  Biventricular pacemaker detected  Abnormal ECG  When compared with ECG of 18-MAR-2025 14:03,  PREVIOUS ECG IS PRESENT  Confirmed by RAJINDER CALDERON ()YULI ([id number]) on 3/20/2025 5:18:20 AM     Echo (TTE) complete (PRN contrast/bubble/strain/3D)   Result Value Ref Range    LA Minor Axis 2.6 cm    LA Major Axis 3.2 cm    LA Area 2C 10.1 cm2    LA Area 4C 10.5 cm2    LA Volume MOD A2C 33 18 - 58 mL    LA Volume MOD A4C 25 18 - 58 mL    LA Volume BP 32 18 - 58 mL    LA Diameter 2.5 cm    AV Mean Velocity 0.8 m/s    AV Mean Gradient 3 mmHg    AV VTI 17.7 cm    AV Peak Velocity 1.0 m/s    AV Peak Gradient 4 mmHg    AV Peak Gradient 4 mmHg    AV Area by VTI 2.2 cm2    AV Area by Peak Velocity 1.9 cm2    Aortic Root 3.6 cm    Ascending Aorta 3.0 cm    IVSd 1.3 (A) 0.6 - 1.0 cm    LVIDd 4.4 4.2 - 5.9 cm    LVIDs 3.8 cm    LVOT Diameter 2.0 cm    LVOT Mean Gradient 1 mmHg    LVOT Mean Gradient 1 mmHg    LVOT VTI 12.2 cm    LVOT Peak Velocity 0.6 m/s    LVOT Peak Gradient 2 mmHg    LVPWd 1.3 (A) 0.6 - 1.0 cm    LV E' Lateral Velocity 11.00 cm/s    LV E' Septal Velocity 8.38 cm/s    LVOT Area 3.1 cm2    LVOT SV 38.3 ml    MV Mean Gradient 1 mmHg    MV VTI 15.7 cm    MV Mean Velocity 0.4 m/s    MV Max Velocity 0.7 m/s    MV Peak Gradient 2 mmHg    MV Area by VTI 2.4 cm2    PV Max Velocity 0.8 m/s    PV Peak Gradient 2 mmHg    RV Free Wall Peak S' 8.2 cm/s

## 2025-03-18 NOTE — PROGRESS NOTES
TRANSFER - IN REPORT:    Verbal report received from Nessa RIVAS on Terry Love  being received from [facility] ED for routine progression of patient care      Report consisted of patient's Situation, Background, Assessment and   Recommendations(SBAR).     Information from the following report(s) Nurse Handoff Report was reviewed with the receiving nurse.    Opportunity for questions and clarification was provided.      Assessment will be completed upon patient's arrival to unit and care assumed.

## 2025-03-19 ENCOUNTER — APPOINTMENT (OUTPATIENT)
Dept: CT IMAGING | Age: 80
End: 2025-03-19
Payer: MEDICARE

## 2025-03-19 ENCOUNTER — APPOINTMENT (OUTPATIENT)
Dept: NON INVASIVE DIAGNOSTICS | Age: 80
End: 2025-03-19
Attending: STUDENT IN AN ORGANIZED HEALTH CARE EDUCATION/TRAINING PROGRAM
Payer: MEDICARE

## 2025-03-19 VITALS
RESPIRATION RATE: 17 BRPM | BODY MASS INDEX: 27.26 KG/M2 | TEMPERATURE: 97.9 F | HEIGHT: 62 IN | DIASTOLIC BLOOD PRESSURE: 87 MMHG | HEART RATE: 76 BPM | SYSTOLIC BLOOD PRESSURE: 121 MMHG | WEIGHT: 148.15 LBS | OXYGEN SATURATION: 98 %

## 2025-03-19 PROBLEM — I63.9 CEREBROVASCULAR ACCIDENT (CVA) (HCC): Status: ACTIVE | Noted: 2025-03-19

## 2025-03-19 LAB
ANION GAP SERPL CALC-SCNC: 11 MMOL/L (ref 7–16)
BUN SERPL-MCNC: 17 MG/DL (ref 8–23)
CALCIUM SERPL-MCNC: 9.3 MG/DL (ref 8.8–10.2)
CHLORIDE SERPL-SCNC: 108 MMOL/L (ref 98–107)
CHOLEST SERPL-MCNC: 118 MG/DL (ref 0–200)
CO2 SERPL-SCNC: 22 MMOL/L (ref 20–29)
CREAT SERPL-MCNC: 1.06 MG/DL (ref 0.8–1.3)
ECHO AO ASC DIAM: 3 CM
ECHO AO ASCENDING AORTA INDEX: 1.79 CM/M2
ECHO AO ROOT DIAM: 3.6 CM
ECHO AO ROOT INDEX: 2.14 CM/M2
ECHO AV AREA PEAK VELOCITY: 1.9 CM2
ECHO AV AREA VTI: 2.2 CM2
ECHO AV AREA/BSA PEAK VELOCITY: 1.1 CM2/M2
ECHO AV AREA/BSA VTI: 1.3 CM2/M2
ECHO AV MEAN GRADIENT: 3 MMHG
ECHO AV MEAN VELOCITY: 0.8 M/S
ECHO AV PEAK GRADIENT: 4 MMHG
ECHO AV PEAK GRADIENT: 4 MMHG
ECHO AV PEAK VELOCITY: 1 M/S
ECHO AV VELOCITY RATIO: 0.6
ECHO AV VTI: 17.7 CM
ECHO BSA: 1.71 M2
ECHO LA AREA 2C: 10.1 CM2
ECHO LA AREA 4C: 10.5 CM2
ECHO LA DIAMETER INDEX: 1.49 CM/M2
ECHO LA DIAMETER: 2.5 CM
ECHO LA MAJOR AXIS: 3.2 CM
ECHO LA MINOR AXIS: 2.6 CM
ECHO LA TO AORTIC ROOT RATIO: 0.69
ECHO LA VOL BP: 32 ML (ref 18–58)
ECHO LA VOL MOD A2C: 33 ML (ref 18–58)
ECHO LA VOL MOD A4C: 25 ML (ref 18–58)
ECHO LA VOL/BSA BIPLANE: 19 ML/M2 (ref 16–34)
ECHO LA VOLUME INDEX MOD A2C: 20 ML/M2 (ref 16–34)
ECHO LA VOLUME INDEX MOD A4C: 15 ML/M2 (ref 16–34)
ECHO LV E' LATERAL VELOCITY: 11 CM/S
ECHO LV E' SEPTAL VELOCITY: 8.38 CM/S
ECHO LV EF PHYSICIAN: 40 %
ECHO LV FRACTIONAL SHORTENING: 14 % (ref 28–44)
ECHO LV INTERNAL DIMENSION DIASTOLE INDEX: 2.62 CM/M2
ECHO LV INTERNAL DIMENSION DIASTOLIC: 4.4 CM (ref 4.2–5.9)
ECHO LV INTERNAL DIMENSION SYSTOLIC INDEX: 2.26 CM/M2
ECHO LV INTERNAL DIMENSION SYSTOLIC: 3.8 CM
ECHO LV IVSD: 1.3 CM (ref 0.6–1)
ECHO LV MASS 2D: 215.1 G (ref 88–224)
ECHO LV MASS INDEX 2D: 128 G/M2 (ref 49–115)
ECHO LV POSTERIOR WALL DIASTOLIC: 1.3 CM (ref 0.6–1)
ECHO LV RELATIVE WALL THICKNESS RATIO: 0.59
ECHO LVOT AREA: 3.1 CM2
ECHO LVOT AV VTI INDEX: 0.69
ECHO LVOT DIAM: 2 CM
ECHO LVOT MEAN GRADIENT: 1 MMHG
ECHO LVOT MEAN GRADIENT: 1 MMHG
ECHO LVOT PEAK GRADIENT: 2 MMHG
ECHO LVOT PEAK VELOCITY: 0.6 M/S
ECHO LVOT STROKE VOLUME INDEX: 22.8 ML/M2
ECHO LVOT SV: 38.3 ML
ECHO LVOT VTI: 12.2 CM
ECHO MV AREA VTI: 2.4 CM2
ECHO MV LVOT VTI INDEX: 1.29
ECHO MV MAX VELOCITY: 0.7 M/S
ECHO MV MEAN GRADIENT: 1 MMHG
ECHO MV MEAN VELOCITY: 0.4 M/S
ECHO MV PEAK GRADIENT: 2 MMHG
ECHO MV VTI: 15.7 CM
ECHO PV MAX VELOCITY: 0.8 M/S
ECHO PV PEAK GRADIENT: 2 MMHG
ECHO RV FREE WALL PEAK S': 8.2 CM/S
ECHO RV TAPSE: 0.8 CM (ref 1.7–?)
ECHO TV REGURGITANT MAX VELOCITY: 2.76 M/S
ECHO TV REGURGITANT PEAK GRADIENT: 30 MMHG
ERYTHROCYTE [DISTWIDTH] IN BLOOD BY AUTOMATED COUNT: 13.1 % (ref 11.9–14.6)
EST. AVERAGE GLUCOSE BLD GHB EST-MCNC: 107 MG/DL
GLUCOSE SERPL-MCNC: 89 MG/DL (ref 70–99)
HBA1C MFR BLD: 5.4 % (ref 0–5.6)
HCT VFR BLD AUTO: 37.3 % (ref 41.1–50.3)
HDLC SERPL-MCNC: 42 MG/DL (ref 40–60)
HDLC SERPL: 2.8 (ref 0–5)
HGB BLD-MCNC: 12.4 G/DL (ref 13.6–17.2)
LDLC SERPL CALC-MCNC: 57 MG/DL (ref 0–100)
MCH RBC QN AUTO: 31.2 PG (ref 26.1–32.9)
MCHC RBC AUTO-ENTMCNC: 33.2 G/DL (ref 31.4–35)
MCV RBC AUTO: 93.7 FL (ref 82–102)
NRBC # BLD: 0 K/UL (ref 0–0.2)
PLATELET # BLD AUTO: 138 K/UL (ref 150–450)
PMV BLD AUTO: 8.4 FL (ref 9.4–12.3)
POTASSIUM SERPL-SCNC: 4.4 MMOL/L (ref 3.5–5.1)
RBC # BLD AUTO: 3.98 M/UL (ref 4.23–5.6)
SODIUM SERPL-SCNC: 141 MMOL/L (ref 136–145)
TRIGL SERPL-MCNC: 94 MG/DL (ref 0–150)
VLDLC SERPL CALC-MCNC: 19 MG/DL (ref 6–23)
WBC # BLD AUTO: 6.5 K/UL (ref 4.3–11.1)

## 2025-03-19 PROCEDURE — 6360000002 HC RX W HCPCS: Performed by: STUDENT IN AN ORGANIZED HEALTH CARE EDUCATION/TRAINING PROGRAM

## 2025-03-19 PROCEDURE — G0378 HOSPITAL OBSERVATION PER HR: HCPCS

## 2025-03-19 PROCEDURE — 97165 OT EVAL LOW COMPLEX 30 MIN: CPT

## 2025-03-19 PROCEDURE — 6360000004 HC RX CONTRAST MEDICATION: Performed by: STUDENT IN AN ORGANIZED HEALTH CARE EDUCATION/TRAINING PROGRAM

## 2025-03-19 PROCEDURE — 93306 TTE W/DOPPLER COMPLETE: CPT | Performed by: INTERNAL MEDICINE

## 2025-03-19 PROCEDURE — 92610 EVALUATE SWALLOWING FUNCTION: CPT

## 2025-03-19 PROCEDURE — 83036 HEMOGLOBIN GLYCOSYLATED A1C: CPT

## 2025-03-19 PROCEDURE — 97161 PT EVAL LOW COMPLEX 20 MIN: CPT

## 2025-03-19 PROCEDURE — 85027 COMPLETE CBC AUTOMATED: CPT

## 2025-03-19 PROCEDURE — 6370000000 HC RX 637 (ALT 250 FOR IP): Performed by: STUDENT IN AN ORGANIZED HEALTH CARE EDUCATION/TRAINING PROGRAM

## 2025-03-19 PROCEDURE — 93005 ELECTROCARDIOGRAM TRACING: CPT | Performed by: INTERNAL MEDICINE

## 2025-03-19 PROCEDURE — 70450 CT HEAD/BRAIN W/O DYE: CPT

## 2025-03-19 PROCEDURE — 36415 COLL VENOUS BLD VENIPUNCTURE: CPT

## 2025-03-19 PROCEDURE — C8929 TTE W OR WO FOL WCON,DOPPLER: HCPCS

## 2025-03-19 PROCEDURE — 2500000003 HC RX 250 WO HCPCS: Performed by: STUDENT IN AN ORGANIZED HEALTH CARE EDUCATION/TRAINING PROGRAM

## 2025-03-19 PROCEDURE — 99222 1ST HOSP IP/OBS MODERATE 55: CPT | Performed by: STUDENT IN AN ORGANIZED HEALTH CARE EDUCATION/TRAINING PROGRAM

## 2025-03-19 PROCEDURE — 97530 THERAPEUTIC ACTIVITIES: CPT

## 2025-03-19 PROCEDURE — 80048 BASIC METABOLIC PNL TOTAL CA: CPT

## 2025-03-19 PROCEDURE — 99222 1ST HOSP IP/OBS MODERATE 55: CPT | Performed by: NURSE PRACTITIONER

## 2025-03-19 PROCEDURE — 96372 THER/PROPH/DIAG INJ SC/IM: CPT

## 2025-03-19 PROCEDURE — 80061 LIPID PANEL: CPT

## 2025-03-19 RX ORDER — ATORVASTATIN CALCIUM 80 MG/1
80 TABLET, FILM COATED ORAL NIGHTLY
Qty: 30 TABLET | Refills: 0 | Status: SHIPPED | OUTPATIENT
Start: 2025-03-19 | End: 2025-03-21 | Stop reason: SDUPTHER

## 2025-03-19 RX ORDER — CLOPIDOGREL BISULFATE 75 MG/1
75 TABLET ORAL
Qty: 30 TABLET | Refills: 0 | Status: SHIPPED | OUTPATIENT
Start: 2025-03-19 | End: 2025-03-21 | Stop reason: SDUPTHER

## 2025-03-19 RX ADMIN — ENOXAPARIN SODIUM 40 MG: 100 INJECTION SUBCUTANEOUS at 08:26

## 2025-03-19 RX ADMIN — SODIUM CHLORIDE, PRESERVATIVE FREE 10 ML: 5 INJECTION INTRAVENOUS at 08:26

## 2025-03-19 RX ADMIN — SULFUR HEXAFLUORIDE 2 ML: KIT at 14:07

## 2025-03-19 RX ADMIN — ASPIRIN 81 MG: 81 TABLET ORAL at 08:26

## 2025-03-19 ASSESSMENT — PAIN SCALES - GENERAL: PAINLEVEL_OUTOF10: 0

## 2025-03-19 NOTE — PROGRESS NOTES
Cardiology confirmed that his ICD is MRI compatible.  However, patient is ready to go home and prefers to obtain MRI outpatient.  I am okay with this, as it is unlikely to  at this point.    He will follow-up with us (orders placed) and vascular surgery outpatient.    Palomo Hightower, DO  Neurology

## 2025-03-19 NOTE — PROGRESS NOTES
SPEECH LANGUAGE PATHOLOGY: DYSPHAGIA Initial Assessment    Acknowledge Order  I  Therapy Time  I   Charges     I  Rehab Caseload Tracker    NAME: Terry Love  : 1945  MRN: [id number]    ADMISSION DATE: 3/18/2025  PRIMARY DIAGNOSIS: TIA (transient ischemic attack)    ICD-10: Treatment Diagnosis: R13.12 Dysphagia, Oropharyngeal Phase    RECOMMENDATIONS   Diet:    Regular Consistency  Thin Liquids    Medication: as tolerated   Compensatory Swallowing Strategies:   Upright as possible for all oral intake   Therapeutic Intervention:   Pending results of further workup   Patient continues to require skilled intervention:  Yes. Recommend ongoing speech therapy services during this hospitalization.     Anticipated Discharge Needs: Do not anticipate ongoing speech therapy needs upon discharge.      ASSESSMENT    Patient presents with functional swallow as can be assessed at bedside. Adequate oral phase, no overt indications of airway compromise with all trials. Patient denies acute changes to speech, swallow, or cognitive-linguistic status.     Recommend regular solids/thin liquid, medications as tolerated. Speech therapy will continue to follow pending results of workup.   GENERAL    Subjective: ROB Bryant cleared patient to participate with speech therapy. Patient upright in bed, pleasant and agreeable to speech therapy evaluation.     Reason for Consult:  CVA workup    History of Present Injury/Illness: Mr. Love  has a past medical history of Anemia, Aspirin long-term use, Asthma, CAD (coronary artery disease), CHF (congestive heart failure) ([facility]), Chronic pain, Decreased cardiac ejection fraction, Dyspnea, GERD (gastroesophageal reflux disease), Heart failure ([facility]), Hiatal hernia, [place] (hard of hearing), Hyperlipidemia, Hypertension, ICD (implantable cardioverter-defibrillator) in place, Ischemic cardiomyopathy, LBBB (left bundle branch block), MI, old, RA (rheumatoid arthritis) ([facility]), S/P CABG x 4, Sleep  apnea, Stage 3a chronic kidney disease (CKD) (HCC), 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 He also  has a past surgical history that includes Total knee arthroplasty (Left); Cardiac catheterization (2014); Cardiac catheterization (2018); Colonoscopy (N/A, 2/25/2022); Carpal tunnel release (Bilateral); Femur fracture surgery (Left, 1979); hernia repair (Right); lumbar fusion; Cataract removal (Bilateral); Colonoscopy; Coronary artery bypass graft (2015); pacemaker (2018); cervical fusion; Cardiac procedure (N/A, 4/5/2023); Cystoscopy (Left, 7/13/2023); Cystoscopy (Bilateral, 7/13/2023); and Cystoscopy (Left, 7/16/2024).  Precautions/Allergies: Other and Sulfa antibiotics     Observations:  Alertness: Alert  Voice: WFL  Speech: Intelligible  Expressive Language: Fluent  Receptive Language: Answers yes/no questions, Follows basic commands, and Appropriately responds to questions  Cognition: Able to recall recent events and medical history and Appropriately attends to clinician    Dysphagia History:  EGD in 2022, otherwise no dysphagia intervention   Previous Instrumental Swallow Studies: None reported or located in chart    Current Diet:  Regular Consistency  Thin Liquids    Respiratory Status: Room air    Pain:  Patient does not c/o pain  Pain intervention: None- No pain observed  Pain response: Patient satisfied    OBJECTIVE    Oral Motor Assessment: Labial: no impairment  Lingual: no impairment  Dentition: natural and adequate  Oral Hygiene: adequate  Vocal quality: WFL  Volitional cough: present and adequate  Oropharyngeal Assessment:  Patient upright in bed, self feeding all trials. Consumed around 4 oz of water via sequential straw sip, adequate siphoning, no overt indications of airway compromise with all trials. Consumed mixed consistency fruit cup and blaise cracker with adequate mastication and clearance, no overt indications of airway compromise. Oral cavity clear

## 2025-03-19 NOTE — CARE COORDINATION
CM consulted secondary to code stroke protocol. Chart reviewed and CM met with patient at bedside to review discharge planning. See service assessment below. Therapy recommendations for  no further skilled needs at discharge reviewed. Patient has no concerns at this time related to discharge or follow-up. Attending notified that patient has no needs at discharge.        03/19/25 7010   Service Assessment   Patient Orientation Alert and Oriented   Cognition Alert   History Provided By Medical Record;Patient   Primary Caregiver Self   Accompanied By/Relationship No one at bedside   Support Systems Spouse/Significant Other   Patient's Healthcare Decision Maker is: Named in Scanned ACP Document   PCP Verified by CM Yes   Last Visit to PCP Within last two years   Prior Functional Level Independent in ADLs/IADLs   Current Functional Level Independent in ADLs/IADLs   Can patient return to prior living arrangement Yes   Ability to make needs known: Good   Family able to assist with home care needs: Yes   Would you like for me to discuss the discharge plan with any other family members/significant others, and if so, who? No   Financial Resources Medicare  ([employer] Medicare)   Community Resources None   CM/SW Referral Other (see comment)  (Code Stroke protocol)   Social/Functional History   Lives With Spouse   Type of Home House   Home Equipment None   Discharge Planning   DME Ordered? No   Potential Assistance Purchasing Medications No   Type of Home Care Services None   Patient expects to be discharged to: House   Services At/After Discharge   Transition of Care Consult (CM Consult) N/A   Services At/After Discharge None   Mode of Transport at Discharge Other (see comment)  (Family to transport)   Confirm Follow Up Transport Self

## 2025-03-19 NOTE — PROGRESS NOTES
Stroke Education provided to patient and past medical records and the following topics were discussed    1. Patients personal risk factors for stroke are carotid stenosis, hypercoagulable state, hyperlipidemia, and hypertension    2. Warning signs of Stroke:        * Sudden numbness or weakness of the face, arm or leg, especially on one side of          The body            * Sudden confusion, trouble speaking or understanding        * Sudden trouble seeing in one or both eyes        * Sudden trouble walking, dizziness, loss of balance or coordination        * Sudden severe headache with no known cause      3. Importance of activation Emergency Medical Services ( 9-1-1 ) immediately if experience any warning signs of stroke.    4. Be sure and schedule a follow-up appointment with your primary care doctor or any specialists as instructed.     5. You must take medicine every day to treat your risk factors for stroke.  Be sure to take your medicines exactly as your doctor tells you: no more, no less.  Know what your medicines are for , what they do.  Anti-thrombotics /anticoagulants can help prevent strokes.  You are taking the following medicine(s)  Plavix Aspirin Atorvastatin     6.  Smoking and second-hand smoke greatly increase your risk of stroke, cardiovascular disease and death. Smoking history Former \"I quit in 6th grade\".    7. Information provided was Stroke Handouts or Verbal Education    8. Documentation of teaching completed in Patient Education Activity and on Care Plan with teaching response noted?  yes

## 2025-03-19 NOTE — PROGRESS NOTES
OCCUPATIONAL THERAPY Initial Assessment and Discharge       OT Visit Days: 1  Acknowledge Orders  Time  OT Charge Capture  Rehab Caseload Tracker      Terry Love is a 79 y.o. male   PRIMARY DIAGNOSIS: TIA (transient ischemic attack)  TIA (transient ischemic attack) [G45.9]       Reason for Referral: Other lack of cordination (R27.8)  Dizziness and Giddiness (R42)  Observation: Payor: [employer] MEDICARE / Plan: [employer] GOLD PLUS HMO / Product Type: *No Product type* /     ASSESSMENT:     REHAB RECOMMENDATIONS:   Recommendation to date pending progress:  Setting:  No further skilled occupational therapy after discharge from hospital    Equipment:    None     ASSESSMENT:  Mr. Love is an 80 y/o M with h/o CAD, HTN, & CKD admitted after episode of L arm numbness & facial droop, currently undergoing CVA workup. 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 Today reports symptoms have resolved and appears to be at baseline for ADLs. No OT needs at this time.     [facility]™ “6 Clicks” Daily Activity Inpatient Short Form:    AM-PAC Daily Activity - Inpatient   How much help is needed for putting on and taking off regular lower body clothing?: None  How much help is needed for bathing (which includes washing, rinsing, drying)?: None  How much help is needed for toileting (which includes using toilet, bedpan, or urinal)?: None  How much help is needed for putting on and taking off regular upper body clothing?: None  How much help is needed for taking care of personal grooming?: None  How much help for eating meals?: None  [facility] Inpatient Daily Activity Raw Score: 24  AM-PAC Inpatient ADL T-Scale Score : 57.54  ADL Inpatient CMS 0-100% Score: 0  ADL Inpatient CMS G-Code Modifier : CH           SUBJECTIVE:     Mr. Love states, \"I am old you know.\"     Social/Functional  Lives with wife who is unwell. He is her caregiver and helps her with ADLs/ IADLs. He drives. Denies falls. He has medical equipment  Body Dressing [] [] [] [] [] [] [] [] [] []    Lower Body Dressing [] [] [] [] [] [] [] [] [] []    Feeding [x] [] [] [] [] [] [] [] [] []    Grooming [] [] [] [] [] [] [] [] [] []    Personal Device Care [x] [] [] [] [] [] [] [] [] []    Functional Mobility [x] [] [] [] [] [] [] [] [] []    I=Independent, Mod I=Modified Independent, S=Supervision/Setup, SBA=Standby Assistance, CGA=Contact Guard Assistance, Min=Minimal Assistance, Mod=Moderate Assistance, Max=Maximal Assistance, Total=Total Assistance, NT=Not Tested    PLAN:   FREQUENCY/DURATION   Discharge OT    PROBLEM LIST:   (Skilled intervention is medically necessary to address:)  None new   INTERVENTIONS PLANNED:  (Benefits and precautions of occupational therapy have been discussed with the patient.)  Education         TREATMENT:     EVALUATION: LOW COMPLEXITY: (Untimed Charge)  The initial evaluation charge encompasses clinical chart review, objective assessment, interpretation of assessment, and skilled monitoring of the patient's response to treatment in order to develop a plan of care.     TREATMENT:   Evaluation only. No treatment provided today.    TREATMENT GRID:  N/A    AFTER TREATMENT PRECAUTIONS: Bed/Chair Locked, Call light within reach, Chair, and RN at bedside    INTERDISCIPLINARY COLLABORATION:  MD/ PA/ NP , RN/ PCT, PT/ PTA, and OT/ RANKIN    EDUCATION:  Education Given To: Patient  Education Provided: Role of Therapy;Plan of Care (signs and symptoms of CVA)  Education Outcome: Verbalized understanding    TOTAL TREATMENT DURATION AND TIME:  Time In: 0940  Time Out: 0950  Minutes: 10    Yudy Phillip OT

## 2025-03-19 NOTE — H&P
11.6 4.0 - 12.0 %    Eosinophils % 7.3 0.5 - 7.8 %    Basophils % 1.0 0.0 - 2.0 %    Immature Granulocytes % 0.3 0.0 - 5.0 %    Neutrophils Absolute 3.12 1.70 - 8.20 K/UL    Lymphocytes Absolute 1.67 0.50 - 4.60 K/UL    Monocytes Absolute 0.70 0.10 - 1.30 K/UL    Eosinophils Absolute 0.44 0.00 - 0.80 K/UL    Basophils Absolute 0.06 0.00 - 0.20 K/UL    Immature Granulocytes Absolute 0.02 0.0 - 0.5 K/UL   Comprehensive Metabolic Panel    Collection Time: 03/18/25  1:52 PM   Result Value Ref Range    Sodium 139 133 - 143 mmol/L    Potassium 4.4 3.5 - 5.1 mmol/L    Chloride 104 98 - 107 mmol/L    CO2 24 20 - 29 mmol/L    Anion Gap 11 7 - 16 mmol/L    Glucose 105 (H) 65 - 100 mg/dL    BUN 23 8 - 23 MG/DL    Creatinine 1.14 0.80 - 1.30 MG/DL    Est, Glom Filt Rate 65 >60 ml/min/1.73m2    Calcium 9.3 8.8 - 10.2 MG/DL    Total Bilirubin 0.4 0.0 - 1.2 MG/DL    ALT 15 12 - 65 U/L    AST 23 15 - 37 U/L    Alk Phosphatase 65 40 - 129 U/L    Total Protein 6.8 6.3 - 8.2 g/dL    Albumin 4.1 3.2 - 4.6 g/dL    Globulin 2.7 2.3 - 3.5 g/dL    Albumin/Globulin Ratio 1.5 1.0 - 1.9     Protime-INR    Collection Time: 03/18/25  1:52 PM   Result Value Ref Range    Protime 14.1 11.3 - 14.9 sec    INR 1.1     Troponin    Collection Time: 03/18/25  1:52 PM   Result Value Ref Range    Troponin T 21.8 0 - 22 ng/L   EKG 12 Lead    Collection Time: 03/18/25  2:03 PM   Result Value Ref Range    Ventricular Rate 75 BPM    Atrial Rate 75 BPM    P-R Interval 168 ms    QRS Duration 184 ms    Q-T Interval 480 ms    QTc Calculation (Bazett) 537 ms    R Axis 258 degrees    T Axis 24 degrees    Diagnosis       Atrial-ventricular dual-paced rhythm  No further analysis attempted due to paced rhythm    Confirmed by MD LAURA (), CORINNE ([id number]) on 3/18/2025 5:34:03 PM         No results for input(s): \"COVID19\" in the last 72 hours.    CTA HEAD NECK W WO CONTRAST  Result Date: 3/18/2025  EXAMINATION: CTA HEAD NECK W WO CONTRAST 3/18/2025 1:51 PM ACCESSION  Cerebellar Artery: Unremarkable Left Superior Cerebellar Artery: Unremarkable Right Posterior Cerebral Artery: Unremarkable Left Posterior Cerebral Artery: Unremarkable Dural Venous Sinuses: Grossly unremarkable. MISCELLANEOUS FINDINGS: Lung Apices: Subpleural reticulation. Neck Soft Tissues: Unremarkable Orbital Soft Tissues: Unremarkable visualized portions of the orbits. Osseous structures: No suspicious lytic or blastic bony lesions.     No acute arterial abnormality of the head or neck. Atherosclerosis of the right proximal internal carotid artery with 60% stenosis. Atherosclerosis of the right carotid bulb with less than 50% stenosis. Atherosclerosis of the left carotid bulb/proximal internal carotid artery with less than 50% stenosis. Additional scattered atherosclerosis without hemodynamically significant stenosis. Electronically signed by Orlando Davidson    CT HEAD WO CONTRAST  Addendum Date: 3/18/2025  Addendum: NOTIFICATION: Dr. Krystina Sampson discussed critical finding(s) and recommendation(s) by direct verbal communication with Dr. Vicente Miranda at 3/18/2025 1:37 PM. Electronically signed by Krystina Sampson    Result Date: 3/18/2025  CT CODE NEURO HEAD WO CONTRAST Indication: 79 years Male Stroke  Comparison: No relevant study for comparison at the time of this dictation. Technique: Multiple contiguous CT axial images were obtained of the head without intravenous administration of contrast. Coronal and sagittal reformats were also performed and interpreted. Dose reduction technique such as a limited exposure control, adjustment of the mA and or kV according to the patient size (manual, auto and/or smart mA)  or iterative reconstruction was employed. FINDINGS: No acute hemorrhage.  Scattered subcortical and periventricular hypoattenuation is nonspecific. Artifact partially obscures visualization of the bk. Artifact partially obscures visualization of the inferior posterior fossa. Supratentorial gray-white

## 2025-03-19 NOTE — PROGRESS NOTES
4 Eyes Skin Assessment     NAME:  Terry Love  YOB: 1945  MEDICAL RECORD NUMBER:  [id number]    The patient is being assessed for  Admission    I agree that at least one RN has performed a thorough Head to Toe Skin Assessment on the patient. ALL assessment sites listed below have been assessed.      Areas assessed by both nurses:    Head, Face, Ears, Shoulders, Back, Chest, Arms, Elbows, Hands, Sacrum. Buttock, Coccyx, Ischium, and Legs. Feet and Heels        Does the Patient have a Wound? No noted wound(s)       Noah Prevention initiated by RN: Yes  Wound Care Orders initiated by RN: No    Pressure Injury (Stage 3,4, Unstageable, DTI, NWPT, and Complex wounds) if present, place Wound referral order by RN under : No    New Ostomies, if present place, Ostomy referral order under : No     Nurse 1 eSignature: Electronically signed by Meli Nicholas RN on 3/18/25 at 8:57 PM EDT      Nurse 2 eSignature: Electronically signed by JAMES TEMPLE RN on 3/19/25 at 6:40 AM EDT

## 2025-03-19 NOTE — CONSULTS
History and Physical/Surgical Consult   Terry Love    Admit date: 3/18/2025    MRN: [id number]     : 1945     Age: 79 y.o.          3/19/2025 12:28 PM    Subjective/HPI:   This patient is a 79 y.o. seen and evaluated at the request of the hospitalist for left-sided weakness. He reports that he was sitting at home yesterday when his left arm started going numb. He presented to the ER for further evaluation. He denies this has ever happened before. He does take a daily ASA. He does not smoke. Tele neuro was consulted and did not feel like he was a candidate for tPA. He cannot have an MRI d/t his defibrillator.     PMH: CAD, MI, s/p CABG, CHF, chronic pain, GERD, [place], ICD, HTN, hyperlipidemia, RA, sleep apnea, CKD, thyroid disease.     Review of Systems  Constitutional: negative  Respiratory: negative  Cardiovascular: negative  Hematologic/lymphatic: negative  Musculoskeletal:negative  Past Medical History:   Diagnosis Date    Anemia     Aspirin long-term use     pt states he does not take plavix and hasn't for approximately a year    Asthma     CAD (coronary artery disease)     STEMI-CABG 4 vessels; followed by Dr. Lira    CHF (congestive heart failure) ([facility])     Chronic pain     lower back pain     Decreased cardiac ejection fraction     Dyspnea     mild restrictive defect per spirometry 23    GERD (gastroesophageal reflux disease)     managed with medication as needed     Heart failure ([facility])     echo 11/3/22 EF 30-35%    Hiatal hernia     [place] (hard of hearing)     hearing aides    Hyperlipidemia     Hypertension     ICD (implantable cardioverter-defibrillator) in place     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 1/2/2015    Thyroid disease       Past Surgical History:   Procedure Laterality Date    CARDIAC CATHETERIZATION  2014    stent RCA    CARDIAC CATHETERIZATION  2018    no stent    CARDIAC PROCEDURE N/A 4/5/2023    Left heart cath / coronary angiography w grafts performed by Jerson Schwarz MD at [facility] CARDIAC CATH LAB    CARPAL TUNNEL RELEASE Bilateral     CATARACT REMOVAL Bilateral     with IOL    CERVICAL FUSION      COLONOSCOPY N/A 2/25/2022    COLONOSCOPY/ 21/ PT HAS ICD PACEMAKER performed by Chano Ham MD at [facility] ENDOSCOPY    COLONOSCOPY      CORONARY ARTERY BYPASS GRAFT  2015    x 4 - Dr. Chowdary    CYSTOSCOPY Left 7/13/2023    CYSTOSCOPY, LEFT URETERAL STENT INSERTION/ Has ICD performed by Eduard Foster Jr., MD at [facility] MAIN OR    CYSTOSCOPY Bilateral 7/13/2023    CYSTOSCOPY RETROGRADE PYELOGRAM performed by Eduard Foster Jr., MD at [facility] MAIN OR    CYSTOSCOPY Left 7/16/2024    CYSTOSCOPY, LEFT URETERAL STENT EXCHANGE - TRIA STENT Patient has an ICD ([employer]) performed by Eduard Foster Jr., MD at [facility] MAIN OR    FEMUR FRACTURE SURGERY Left 1979    HERNIA REPAIR Right     inguinal, ventral-after bypass    LUMBAR FUSION      PACEMAKER  2018    icd, no shocks     TOTAL KNEE ARTHROPLASTY Left       Allergies   Allergen Reactions    Other Other (See Comments)     Equine protein (in tetanus vaccine); pt states he doesn't know the reaction    Sulfa Antibiotics Swelling     Other reaction(s): unknown      Social History     Tobacco Use    Smoking status: Never     Passive exposure: Never    Smokeless tobacco: Never   Substance Use Topics    Alcohol use: Not Currently      Social History     Social History Narrative    Not on file     Family History   Problem Relation Age of Onset    Cancer Mother         pancreatic    Heart Disease Father     Heart Attack Father     Heart Disease Paternal Uncle     Heart Disease Maternal Grandfather

## 2025-03-19 NOTE — CONSULTS
Neurology Consult Note       History:   79-year-old male with ischemic cardiomyopathy s/p ICD, CABG (2014), KEVIN presents with transient episode of altered sensation in his left hand and forearm.  Describes feeling as if it was someone else's hand.  Lasted for about an hour before resolving.  Takes aspirin at home.       Exam: Pertinent positives and negatives include:  Awake, alert, and oriented. No language deficits or dysarthria.  No involuntary abnormal saccades or nystagmus.  No facial asymmetry. Moving all extremities spontaneously and with purpose.  No abnormal involuntary movements and muscle tone is normal throughout. Sensation is intact to light touch.     Imaging and review of data:   CT head with vague hypodensity around the primary sensory cortex.     CTA ~60% proximal right ICA stenosis      Assessment and Plan:   79-year-old male with transient loss of sensation in the left hand and arm x1 hour, concerning for TIA versus acute ischemic stroke. With abnormal CTA as above, this is concerning for a symptomatic right ICA stenosis.      Plan:  Agree with vascular surgery evaluation.      Cardiology also contacted to interrogate his ICD for arrhythmias, and to determine whether his ICD is MRI compatible.    Continue dual antiplatelets.  Continue statin at prior home dose, LDL is at goal.    -180, DBP <100, MAPs >65.  Long-term goal <130/80. Gradual adjustments with PO agents preferred over rapid-acting IV, if needed to control.     Completion of workup with TTE.     Counseled on adherence to AHA's Life's Essential 8.      Palomo Hightower DO  Neurology      Cumulative time spent today was 45 minutes which included chart review, obtaining history (from patient, family, or other providers), review of images, examining the patient, and counseling the patient and/or family on medical condition.

## 2025-03-19 NOTE — CARE COORDINATION
CM consult received secondary to code stroke protocol. CM screened chart this AM, will discuss inpatient plan of care in IDT rounds and will plan to meet with patient today.

## 2025-03-19 NOTE — DISCHARGE SUMMARY
BUN 23 17   CREATININE 1.14 1.06   LABGLOM 65 71   CALCIUM 9.3 9.3   GLUCOSE 105* 89      CBC Recent Labs     03/18/25  1352 03/19/25  0539   WBC 6.0 6.5   RBC 3.80* 3.98*   HGB 12.0* 12.4*   HCT 37.1* 37.3*   MCV 97.6 93.7   MCH 31.6 31.2   MCHC 32.3 33.2   RDW 12.8 13.1   * 138*   MPV 8.3* 8.4*   NRBC 0.00 0.00   LYMPHOPCT 27.8  --    MONOPCT 11.6  --    BASOPCT 1.0  --    IMMGRAN 0.3  --    LYMPHSABS 1.67  --    EOSABS 0.44  --    MONOSABS 0.70  --    BASOSABS 0.06  --    ABSIMMGRAN 0.02  --       LFT Recent Labs     03/18/25  1352   BILITOT 0.4   ALKPHOS 65   AST 23   ALT 15   PROTEIN 6.8   ALBUMIN 4.1   GLOB 2.7      Cardiac  No results found for: \"NTPROBNP\", \"TROPHS\"   Coags Lab Results   Component Value Date/Time    PROTIME 14.1 03/18/2025 01:52 PM    INR 1.1 03/18/2025 01:52 PM      A1c Lab Results   Component Value Date/Time    LABA1C 5.4 03/19/2025 05:39 AM     03/19/2025 05:39 AM      Lipids Lab Results   Component Value Date/Time    CHOL 118 03/19/2025 05:39 AM    HDL 42 03/19/2025 05:39 AM    CHOLHDLRATIO 2.8 03/19/2025 05:39 AM    TRIG 94 03/19/2025 05:39 AM      Thyroid  No results found for: \"TSHELE\", \"[id number]\"     Most Recent UA Lab Results   Component Value Date/Time    COLORU YELLOW/STRAW 07/09/2024 10:19 AM    APPEARANCE CLEAR 07/09/2024 10:19 AM    PROTEINU Negative 07/09/2024 10:19 AM    GLUCOSEU Negative 07/09/2024 10:19 AM    GLUCOSEU Negative 07/11/2023 09:45 AM    KETUA Negative 07/09/2024 10:19 AM    BILIRUBINUR Negative 07/09/2024 10:19 AM    BLOODU Negative 07/09/2024 10:19 AM    UROBILINOGEN 0.2 07/09/2024 10:19 AM    NITRU Negative 07/09/2024 10:19 AM    LEUKOCYTESUR Negative 07/09/2024 10:19 AM        Microbiology:  Results       ** No results found for the last 336 hours. **            All Labs from Last 24 Hrs:  Recent Results (from the past 24 hours)   Basic Metabolic Panel w/ Reflex to MG    Collection Time: 03/19/25  5:39 AM   Result Value Ref Range    Sodium 141  m/s    LVOT Peak Gradient 2 mmHg    LVPWd 1.3 (A) 0.6 - 1.0 cm    LV E' Lateral Velocity 11.00 cm/s    LV E' Septal Velocity 8.38 cm/s    LVOT Area 3.1 cm2    LVOT SV 38.3 ml    MV Mean Gradient 1 mmHg    MV VTI 15.7 cm    MV Mean Velocity 0.4 m/s    MV Max Velocity 0.7 m/s    MV Peak Gradient 2 mmHg    MV Area by VTI 2.4 cm2    PV Max Velocity 0.8 m/s    PV Peak Gradient 2 mmHg    RV Free Wall Peak S' 8.2 cm/s    TAPSE 0.8 (A) >=1.7 cm    TR Max Velocity 2.76 m/s    TR Peak Gradient 30 mmHg    Body Surface Area 1.71 m2    Fractional Shortening 2D 14 28 - 44 %    LVIDd Index 2.62 cm/m2    LVIDs Index 2.26 cm/m2    LV RWT Ratio 0.59     LV Mass 2D 215.1 88 - 224 g    LV Mass 2D Index 128.0 (A) 49 - 115 g/m2    LA Volume Index BP 19 16 - 34 ml/m2    LVOT Stroke Volume Index 22.8 mL/m2    LA Volume Index MOD A2C 20 16 - 34 ml/m2    LA Volume Index MOD A4C 15 (A) 16 - 34 ml/m2    LA Size Index 1.49 cm/m2    LA/AO Root Ratio 0.69     Ao Root Index 2.14 cm/m2    Ascending Aorta Index 1.79 cm/m2    AV Velocity Ratio 0.60     LVOT:AV VTI Index 0.69     MONTSERRAT/BSA VTI 1.3 cm2/m2    MONTSERRAT/BSA Peak Velocity 1.1 cm2/m2    MV:LVOT VTI Index 1.29     EF Physician 40 %   EKG 12 Lead    Collection Time: 03/19/25  1:36 PM   Result Value Ref Range    Ventricular Rate 75 BPM    Atrial Rate 75 BPM    P-R Interval 224 ms    QRS Duration 126 ms    Q-T Interval 422 ms    QTc Calculation (Bazett) 471 ms    P Axis 15 degrees    R Axis 251 degrees    T Axis 18 degrees    Diagnosis       AV dual-paced rhythm with prolonged AV conduction  Biventricular pacemaker detected  Abnormal ECG  When compared with ECG of 18-MAR-2025 14:03,  PREVIOUS ECG IS PRESENT         No results for input(s): \"COVID19\" in the last 72 hours.    Recent Vital Data:  Patient Vitals for the past 24 hrs:   Temp Pulse Resp BP SpO2   03/19/25 1200 98.1 °F (36.7 °C) 85 16 106/68 97 %   03/19/25 0800 97.9 °F (36.6 °C) 84 18 105/73 97 %   03/19/25 0400 97.2 °F (36.2 °C) 86 16 (!)

## 2025-03-19 NOTE — PROGRESS NOTES
ACUTE PHYSICAL THERAPY GOALS:   (Developed with and agreed upon by patient and/or caregiver.)  Pt will be INDEPENDENT for all bed mobility with use of external supports as needed within 7 treatment days  Pt will be INDEPENDENT with static seated balance activities without loss of balance and use of external supports as needed within 7 treatment days.   Pt will be INDEPENDENT with dynamic seated balance activities without loss of balance reaching outside MARCE within 7 treatment days.   Pt will be SUPERVISION with STS from all surfaces using LRAD/no device and no loss of balance within 7 treatment days.   Pt will be SUPERVISION for all dynamic standing balance activities without loss of balance within 7 treatment days.   Pt will ambulate 250 feet SUPERVISION with LRAD/no device without LOB or miss steps within 7 treatment days.   Pt will safely negotiate 5 steps with SUPERVISION using external supports as needed with 7 treatment days.   All goals met at initial evaluation on 3/19/25.       PHYSICAL THERAPY Initial Assessment, Daily Note, Discharge, and AM  (Link to Caseload Tracking: PT Visit Days : 1  Acknowledge Orders  Time In/Out  PT Charge Capture  Rehab Caseload Tracker    Terry Love is a 79 y.o. male   PRIMARY DIAGNOSIS: TIA (transient ischemic attack)  TIA (transient ischemic attack) [G45.9]       Reason for Referral: History of falling (Z91.81)  Dizziness and Giddiness (R42)  Observation: Payor: [employer] MEDICARE / Plan: [employer] GOLD PLUS HMO / Product Type: *No Product type* /     ASSESSMENT:     REHAB RECOMMENDATIONS:   Recommendation to date pending progress:  Setting:  No further skilled physical therapy after discharge from hospital    Equipment:    None     ASSESSMENT:  Mr. Love is a 79 year old male admitted on 3/18/25 for TIA workup following episode of altered sensation in L UE. Pt is independent at BL and lives in a single level home, 3-4STE. Pt lives with wife who has active cancer dx and  Tested    PLAN:   FREQUENCY AND DURATION:  (eval & Dc) for duration of hospital stay or until stated goals are met, whichever comes first.    THERAPY PROGNOSIS: Excellent    PROBLEM LIST:   (Skilled intervention is medically necessary to address:)  Decreased Activity Tolerance INTERVENTIONS PLANNED:   (Benefits and precautions of physical therapy have been discussed with the patient.)  Self Care Training  Therapeutic Activity  Therapeutic Exercise/HEP  Neuromuscular Re-education  Gait Training  Education       TREATMENT:   EVALUATION: LOW COMPLEXITY: (Untimed Charge)  The initial evaluation charge encompasses clinical chart review, objective assessment, interpretation of assessment, and skilled monitoring of the patient's response to treatment in order to develop a plan of care.     TREATMENT:   Co-Treatment PT/OT necessary due to patient's decreased overall endurance/tolerance levels, as well as need for high level skilled assistance to complete functional transfers/mobility and functional tasks  Therapeutic Activity (10 Minutes): Therapeutic activity included Supine to Sit, Transfer Training, Ambulation on level ground, Stair Training, Sitting balance , and Standing balance to improve functional Activity tolerance, Balance, Mobility, and Strength.    TREATMENT GRID:  N/A    AFTER TREATMENT PRECAUTIONS: Bed/Chair Locked, Call light within reach, Chair, Needs within reach, and RN notified    INTERDISCIPLINARY COLLABORATION:  RN/ PCT, PT/ PTA, and OT/ RANKIN    EDUCATION: Education Given To: Patient  Education Provided: Role of Therapy  Educated patient and/or family/caregiver on the following: WALE    TIME IN/OUT:  Time In: 0940  Time Out: 0956  Minutes: 16    Jackie Bravo, SPT

## 2025-03-20 ENCOUNTER — CARE COORDINATION (OUTPATIENT)
Dept: CARE COORDINATION | Facility: CLINIC | Age: 80
End: 2025-03-20

## 2025-03-20 LAB
EKG ATRIAL RATE: 75 BPM
EKG DIAGNOSIS: NORMAL
EKG P AXIS: 15 DEGREES
EKG P-R INTERVAL: 224 MS
EKG Q-T INTERVAL: 422 MS
EKG QRS DURATION: 126 MS
EKG QTC CALCULATION (BAZETT): 471 MS
EKG R AXIS: 251 DEGREES
EKG T AXIS: 18 DEGREES
EKG VENTRICULAR RATE: 75 BPM

## 2025-03-20 PROCEDURE — 93010 ELECTROCARDIOGRAM REPORT: CPT | Performed by: INTERNAL MEDICINE

## 2025-03-20 NOTE — CARE COORDINATION
has hospital follow up appointment scheduled within 7 days of discharge.   Future Appointments         Provider Specialty Dept Phone    3/27/2025 1:00 PM Meli Olivares APRN Neurology [phone]    3/28/2025 11:30 AM [facility] 2 Vascular Surgery [phone]    3/28/2025 11:45 AM David Melgar MD Vascular Surgery [phone]    4/8/2025 3:30 PM Sherley Buchanan MD Cardiology [phone]    5/6/2025 8:30 AM [facility] ECHO 21 Cardiology [phone]    5/8/2025 9:30 AM Sherley Buchanan MD Cardiology [phone]    7/30/2025 8:30 AM (Arrive by 8:15 AM) Michael Curtis MD Pulmonology [phone]            Care Transition Nurse provided contact information.  Plan for follow-up call in 6-10 days based on severity of symptoms and risk factors.  Plan for next call: symptom management.    Mahogany Simmons RN

## 2025-03-21 ENCOUNTER — OFFICE VISIT (OUTPATIENT)
Age: 80
End: 2025-03-21

## 2025-03-21 VITALS
HEIGHT: 62 IN | WEIGHT: 149.2 LBS | DIASTOLIC BLOOD PRESSURE: 70 MMHG | HEART RATE: 76 BPM | SYSTOLIC BLOOD PRESSURE: 100 MMHG | BODY MASS INDEX: 27.46 KG/M2

## 2025-03-21 DIAGNOSIS — I65.23 BILATERAL CAROTID ARTERY STENOSIS: ICD-10-CM

## 2025-03-21 DIAGNOSIS — I95.89 IATROGENIC HYPOTENSION: ICD-10-CM

## 2025-03-21 DIAGNOSIS — G45.9 TIA (TRANSIENT ISCHEMIC ATTACK): Primary | ICD-10-CM

## 2025-03-21 DIAGNOSIS — I20.89 STABLE ANGINA: ICD-10-CM

## 2025-03-21 DIAGNOSIS — I50.22 CHRONIC HFREF (HEART FAILURE WITH REDUCED EJECTION FRACTION) (HCC): ICD-10-CM

## 2025-03-21 DIAGNOSIS — I25.5 ISCHEMIC CARDIOMYOPATHY: ICD-10-CM

## 2025-03-21 RX ORDER — CLOPIDOGREL BISULFATE 75 MG/1
75 TABLET ORAL
Qty: 90 TABLET | Refills: 3 | Status: SHIPPED | OUTPATIENT
Start: 2025-03-21

## 2025-03-21 RX ORDER — SACUBITRIL AND VALSARTAN 24; 26 MG/1; MG/1
1 TABLET, FILM COATED ORAL DAILY
COMMUNITY
Start: 2025-03-21

## 2025-03-21 RX ORDER — ATORVASTATIN CALCIUM 80 MG/1
80 TABLET, FILM COATED ORAL NIGHTLY
Qty: 90 TABLET | Refills: 3 | Status: SHIPPED | OUTPATIENT
Start: 2025-03-21

## 2025-03-21 RX ORDER — EZETIMIBE 10 MG/1
10 TABLET ORAL DAILY
Qty: 90 TABLET | Refills: 3 | Status: SHIPPED | OUTPATIENT
Start: 2025-03-21

## 2025-03-21 RX ORDER — METOPROLOL TARTRATE 25 MG/1
25 TABLET, FILM COATED ORAL 2 TIMES DAILY
Qty: 180 TABLET | Refills: 3 | Status: SHIPPED | OUTPATIENT
Start: 2025-03-21

## 2025-03-21 ASSESSMENT — ENCOUNTER SYMPTOMS: SHORTNESS OF BREATH: 0

## 2025-03-27 ENCOUNTER — CARE COORDINATION (OUTPATIENT)
Dept: CARE COORDINATION | Facility: CLINIC | Age: 80
End: 2025-03-27

## 2025-03-27 ENCOUNTER — OFFICE VISIT (OUTPATIENT)
Dept: NEUROLOGY | Age: 80
End: 2025-03-27
Payer: MEDICARE

## 2025-03-27 VITALS
DIASTOLIC BLOOD PRESSURE: 70 MMHG | OXYGEN SATURATION: 97 % | HEART RATE: 80 BPM | BODY MASS INDEX: 21.33 KG/M2 | WEIGHT: 149 LBS | HEIGHT: 70 IN | SYSTOLIC BLOOD PRESSURE: 114 MMHG

## 2025-03-27 DIAGNOSIS — Z09 HOSPITAL DISCHARGE FOLLOW-UP: Primary | ICD-10-CM

## 2025-03-27 DIAGNOSIS — G45.9 TIA (TRANSIENT ISCHEMIC ATTACK): ICD-10-CM

## 2025-03-27 DIAGNOSIS — I65.23 CAROTID STENOSIS, ASYMPTOMATIC, BILATERAL: ICD-10-CM

## 2025-03-27 DIAGNOSIS — I10 HTN, GOAL BELOW 140/80: ICD-10-CM

## 2025-03-27 DIAGNOSIS — Z86.69 HISTORY OF SLEEP APNEA: ICD-10-CM

## 2025-03-27 PROCEDURE — 1036F TOBACCO NON-USER: CPT | Performed by: NURSE PRACTITIONER

## 2025-03-27 PROCEDURE — 3078F DIAST BP <80 MM HG: CPT | Performed by: NURSE PRACTITIONER

## 2025-03-27 PROCEDURE — 1123F ACP DISCUSS/DSCN MKR DOCD: CPT | Performed by: NURSE PRACTITIONER

## 2025-03-27 PROCEDURE — 1111F DSCHRG MED/CURRENT MED MERGE: CPT | Performed by: NURSE PRACTITIONER

## 2025-03-27 PROCEDURE — 3074F SYST BP LT 130 MM HG: CPT | Performed by: NURSE PRACTITIONER

## 2025-03-27 PROCEDURE — G8420 CALC BMI NORM PARAMETERS: HCPCS | Performed by: NURSE PRACTITIONER

## 2025-03-27 PROCEDURE — 99204 OFFICE O/P NEW MOD 45 MIN: CPT | Performed by: NURSE PRACTITIONER

## 2025-03-27 PROCEDURE — 1159F MED LIST DOCD IN RCRD: CPT | Performed by: NURSE PRACTITIONER

## 2025-03-27 PROCEDURE — 1126F AMNT PAIN NOTED NONE PRSNT: CPT | Performed by: NURSE PRACTITIONER

## 2025-03-27 PROCEDURE — 1160F RVW MEDS BY RX/DR IN RCRD: CPT | Performed by: NURSE PRACTITIONER

## 2025-03-27 PROCEDURE — G8427 DOCREV CUR MEDS BY ELIG CLIN: HCPCS | Performed by: NURSE PRACTITIONER

## 2025-03-27 ASSESSMENT — ENCOUNTER SYMPTOMS
ALLERGIC/IMMUNOLOGIC NEGATIVE: 1
APNEA: 1
TROUBLE SWALLOWING: 0
VOICE CHANGE: 0
GASTROINTESTINAL NEGATIVE: 1
EYES NEGATIVE: 1

## 2025-03-27 ASSESSMENT — PATIENT HEALTH QUESTIONNAIRE - PHQ9
SUM OF ALL RESPONSES TO PHQ QUESTIONS 1-9: 0
2. FEELING DOWN, DEPRESSED OR HOPELESS: NOT AT ALL
1. LITTLE INTEREST OR PLEASURE IN DOING THINGS: NOT AT ALL
SUM OF ALL RESPONSES TO PHQ QUESTIONS 1-9: 0

## 2025-03-27 NOTE — CARE COORDINATION
LPN Care Coordinator provided contact information.  Plan for follow-up call in 6-10 days based on severity of symptoms and risk factors.  Plan for next call: symptom management    Denae Tellez LPN

## 2025-03-27 NOTE — PROGRESS NOTES
intracranial abnormality  2. Mild global generalized brain volume loss with minimal  chronic small vessel  ischemic changes at the periventricular white matter    Electronically signed by Laura Atwood    EXAMINATION: CTA HEAD NECK W WO CONTRAST 3/18/2025 1:51 PM     ACCESSION NUMBER: [id number]     COMPARISON: None available     INDICATION: Stroke     TECHNIQUE: Dedicated contrast enhanced CT of the arteries of the head and neck  was performed with axial images following a timed vascular bolus of 100 mL of  Isovue-370.  Coronal and sagittal reformats are provided.  3-D reconstructions  are provided.     Radiation dose reduction techniques were used for this study. Our CT scanners  use one or all of the following: Automated exposure control, adjustment of the  mA and/or kV according to patient size, iterative reconstruction.        FINDINGS:     NECK VASCULATURE:     Aortic Arch: Ascending thoracic aortic diameter of 3.9 cm.  Proximal Right Subclavian Artery: Atherosclerosis without significant stenosis.  Proximal Left Subclavian Artery: Atherosclerosis without significant stenosis.     Right Common Carotid Artery: Unremarkable  Right Carotid Bifurcation: Atherosclerosis with less than 50% stenosis.  Right Internal Carotid Artery, Cervical Segment: Atherosclerosis of the left  proximal internal carotid artery with 60% stenosis.     Left Common Carotid Artery: Unremarkable  Left Carotid Bifurcation: Atherosclerosis with less than 50% stenosis.  Left Internal Carotid Artery, Cervical Segment: Atherosclerosis with less than  50% stenosis.     Right Vertebral Artery, Cervical Segment: Unremarkable  Left Vertebral Artery, Cervical Segment: Unremarkable     INTRACRANIAL VASCULATURE:     Right Internal Carotid Artery: Atherosclerosis without significant stenosis.  Right Middle Cerebral Artery: Unremarkable     Left Internal Carotid Artery: Atherosclerosis without significant stenosis.  Left middle cerebral artery:

## 2025-03-28 ENCOUNTER — OFFICE VISIT (OUTPATIENT)
Dept: VASCULAR SURGERY | Age: 80
End: 2025-03-28
Payer: MEDICARE

## 2025-03-28 VITALS
WEIGHT: 147.6 LBS | DIASTOLIC BLOOD PRESSURE: 77 MMHG | BODY MASS INDEX: 21.13 KG/M2 | HEIGHT: 70 IN | HEART RATE: 86 BPM | OXYGEN SATURATION: 95 % | SYSTOLIC BLOOD PRESSURE: 116 MMHG

## 2025-03-28 DIAGNOSIS — I65.23 CALCIFICATION OF BOTH CAROTID ARTERIES: Primary | ICD-10-CM

## 2025-03-28 PROCEDURE — 3078F DIAST BP <80 MM HG: CPT | Performed by: SURGERY

## 2025-03-28 PROCEDURE — 99204 OFFICE O/P NEW MOD 45 MIN: CPT | Performed by: SURGERY

## 2025-03-28 PROCEDURE — G2211 COMPLEX E/M VISIT ADD ON: HCPCS | Performed by: SURGERY

## 2025-03-28 PROCEDURE — 1036F TOBACCO NON-USER: CPT | Performed by: SURGERY

## 2025-03-28 PROCEDURE — 1159F MED LIST DOCD IN RCRD: CPT | Performed by: SURGERY

## 2025-03-28 PROCEDURE — 1123F ACP DISCUSS/DSCN MKR DOCD: CPT | Performed by: SURGERY

## 2025-03-28 PROCEDURE — 3074F SYST BP LT 130 MM HG: CPT | Performed by: SURGERY

## 2025-03-28 PROCEDURE — G8427 DOCREV CUR MEDS BY ELIG CLIN: HCPCS | Performed by: SURGERY

## 2025-03-28 PROCEDURE — 1111F DSCHRG MED/CURRENT MED MERGE: CPT | Performed by: SURGERY

## 2025-03-28 PROCEDURE — G8420 CALC BMI NORM PARAMETERS: HCPCS | Performed by: SURGERY

## 2025-03-28 NOTE — PROGRESS NOTES
[address] [address]  [phone] FAX: [phone]    Terry Love  1945    Chief Complaint   Patient presents with    Follow-up     Hospital f/u; Carotid u/s           HPI   Mr. Terry Love is a 79 y.o. year old male who follow-up hospitalization.  Patient denies any neurological symptoms.    Current Outpatient Medications   Medication Sig Dispense Refill    ezetimibe (ZETIA) 10 MG tablet Take 1 tablet by mouth daily 90 tablet 3    metoprolol tartrate (LOPRESSOR) 25 MG tablet Take 1 tablet by mouth 2 times daily 180 tablet 3    atorvastatin (LIPITOR) 80 MG tablet Take 1 tablet by mouth at bedtime 90 tablet 3    clopidogrel (PLAVIX) 75 MG tablet Take 1 tablet by mouth nightly 90 tablet 3    sacubitril-valsartan (ENTRESTO) 24-26 MG per tablet Take 1 tablet by mouth daily      albuterol sulfate HFA (VENTOLIN HFA) 108 (90 Base) MCG/ACT inhaler Inhale 2 puffs into the lungs 3 times daily 3 each 3    nitroGLYCERIN (NITROSTAT) 0.4 MG SL tablet Place 1 tablet under the tongue every 5 minutes as needed for Chest pain 25 tablet 2    BiPAP Machine MISC by Does not apply route at bedtime      ascorbic acid (VITAMIN C) 500 MG tablet Take by mouth      aspirin 81 MG EC tablet Take 1 tablet by mouth daily      leflunomide (ARAVA) 20 MG tablet Take 1 tablet by mouth at bedtime      traMADol (ULTRAM) 50 MG tablet Take 1 tablet by mouth as needed. Indications: every 6 hours as needed       No current facility-administered medications for this visit.     Allergies   Allergen Reactions    Other Other (See Comments)     Equine protein (in tetanus vaccine); pt states he doesn't know the reaction    Sulfa Antibiotics Swelling     Other reaction(s): unknown     Past Medical History:   Diagnosis Date    Anemia     Aspirin long-term use     pt states he does not take plavix and hasn't for approximately a year    Asthma     CAD (coronary artery disease) 2014    STEMI-CABG 4

## 2025-04-03 ENCOUNTER — CARE COORDINATION (OUTPATIENT)
Dept: CARE COORDINATION | Facility: CLINIC | Age: 80
End: 2025-04-03

## 2025-04-03 NOTE — CARE COORDINATION
Care Transitions Note    Follow Up Call     Patient Current Location:  Home: [address] [place] [address] [place] [address]    N Care Coordinator contacted the patient by telephone. Verified name and  as identifiers.    Additional needs identified to be addressed with provider   No needs identified                 Method of communication with provider: none.    Care Summary Note: Patient reports no issues at time of call.  Patient was seen at [facility] on 3/27/25 at which time Meli Olivares NP placed order for MRI Brain without contrast.  Patient states he has not heard from radiology to schedule and was told to call on Monday, 3/31/25.  Patient did call and was told they would schedule as soon as possible.  Contacted radiology scheduling [phone].  Spoke with Rajni who reports email was sent to Nataly Maradiaga for approval since the patient has an implanted biventricular device that would need a tech from [facility] () present to put device in MRI safe mode.  Patient notified he should hear from them soon.  Patient verbalized understanding and asked that they schedule him at [place] if possible.  Called radiology scheduling back to notify them of patient's wish to be scheduled at [place].  Per  that decision will be made depending on the techs recommendation from [facility].  Informed patient who verbalized understanding.          Advance Care Planning:   Does patient have an Advance Directive: reviewed during previous call, see note.     Medication Review:  Omeprazole 40mg discontinued    Assessments:   Goals Addressed                   This Visit's Progress     Returns to baseline activity level.   On track     Patient/Family able to verbalize medicine changes     Patient/Family aware and attends follow up appointments s/p d/c.     Patient/Family agrees to notify provider of any barriers to plan of care.    Patient/Family agrees to notify provider of any symptoms that

## 2025-04-10 ENCOUNTER — CARE COORDINATION (OUTPATIENT)
Dept: CARE COORDINATION | Facility: CLINIC | Age: 80
End: 2025-04-10

## 2025-04-10 NOTE — CARE COORDINATION
Care Transitions Note    Follow Up Call     Patient Current Location:  Home: [address] [place] [address] [address] [place] [address]    LPN Care Coordinator contacted the patient by telephone. Verified name and  as identifiers.    Additional needs identified to be addressed with provider   No needs identified                 Method of communication with provider: none.    Care Summary Note: Patient reports doing fine today.  I assisted patient with contacting radiology to schedule his MRI brain last week.  [facility]  Patient was unable to have this at [place] as requested due to his pacemaker.  Patient verbalized no questions or concerns today.  No changes noted or reported.        Advance Care Planning:   Does patient have an Advance Directive: reviewed during previous call, see note. .    Medication Review:  No changes since last call.     Assessments:   Goals Addressed                   This Visit's Progress     Returns to baseline activity level.   On track     Patient/Family able to verbalize medicine changes     Patient/Family aware and attends follow up appointments s/p d/c.     Patient/Family agrees to notify provider of any barriers to plan of care.    Patient/Family agrees to notify provider of any symptoms that indicate a worsening of condition               Follow Up Appointment:   Reviewed upcoming appointment(s).  Future Appointments         Provider Specialty Dept Phone    2025 11:45 AM [facility] MRI UNIT 1 Radiology [phone]    2025 11:00 AM Alfred Ashley MD Internal Medicine [phone]    2025 1:15 PM Sherley Buchanan MD Cardiology [phone]    2025 9:40 AM Meli Olivares APRN Neurology [phone]    2025 8:30 AM (Arrive by 8:15 AM) Michael Curtis MD Pulmonology [phone]    10/3/2025 10:30 AM [facility] ULTRAS[facility] 3 Vascular Surgery [phone]    10/3/2025 11:15 AM David Melgar MD Vascular Surgery [phone]

## 2025-04-18 ENCOUNTER — CARE COORDINATION (OUTPATIENT)
Dept: CARE COORDINATION | Facility: CLINIC | Age: 80
End: 2025-04-18

## 2025-04-18 NOTE — CARE COORDINATION
Care Transitions Note    Final Call     Attempted to reach patient for transitions of care follow up.  Unable to reach patient.      Outreach Attempts:   HIPAA compliant voicemail left for patient.     Patient graduated from the Care Transitions program on 4/18/25.  Patient/family verbalizes confidence in the ability to self-manage at this time.      Handoff:   Patient was not referred to the ACM team due to unable to contact patient.      Care Summary Note: Unable to reach patient for final REKHA outreach.    HIPAA compliant voicemail left for patient.   Reviewed upcoming appointments which are scheduled appropriately.    Assessments:   Goals Addressed                   This Visit's Progress     COMPLETED: Returns to baseline activity level.        Patient/Family able to verbalize medicine changes     Patient/Family aware and attends follow up appointments s/p d/c.     Patient/Family agrees to notify provider of any barriers to plan of care.    Patient/Family agrees to notify provider of any symptoms that indicate a worsening of condition               Upcoming Appointments:    Future Appointments         Provider Specialty Dept Phone    4/25/2025 11:45 AM [facility] MRI UNIT 1 Radiology [phone]    4/28/2025 11:00 AM Alfred Ashley MD Internal Medicine [phone]    6/27/2025 1:15 PM Sherley Buchanan MD Cardiology [phone]    7/1/2025 9:40 AM Meli Olivares APRN Neurology [phone]    7/30/2025 8:30 AM (Arrive by 8:15 AM) Michael Curtis MD Pulmonology [phone]    10/3/2025 10:30 AM [facility] ULTRASOUND 3 Vascular Surgery [phone]    10/3/2025 11:15 AM David Melgar MD Vascular Surgery [phone]            Denae Tellez LPN

## 2025-04-25 ENCOUNTER — HOSPITAL ENCOUNTER (OUTPATIENT)
Dept: MRI IMAGING | Age: 80
Discharge: HOME | End: 2025-04-27
Payer: MEDICARE

## 2025-04-25 DIAGNOSIS — I65.23 CAROTID STENOSIS, ASYMPTOMATIC, BILATERAL: ICD-10-CM

## 2025-04-25 DIAGNOSIS — G45.9 TIA (TRANSIENT ISCHEMIC ATTACK): ICD-10-CM

## 2025-04-25 PROCEDURE — 70551 MRI BRAIN STEM W/O DYE: CPT

## 2025-04-28 ENCOUNTER — RESULTS FOLLOW-UP (OUTPATIENT)
Dept: NEUROLOGY | Age: 80
End: 2025-04-28

## 2025-04-28 ENCOUNTER — OFFICE VISIT (OUTPATIENT)
Dept: INTERNAL MEDICINE CLINIC | Facility: CLINIC | Age: 80
End: 2025-04-28
Payer: MEDICARE

## 2025-04-28 VITALS
BODY MASS INDEX: 21.19 KG/M2 | HEIGHT: 70 IN | SYSTOLIC BLOOD PRESSURE: 120 MMHG | WEIGHT: 148 LBS | DIASTOLIC BLOOD PRESSURE: 82 MMHG | OXYGEN SATURATION: 91 % | HEART RATE: 94 BPM

## 2025-04-28 DIAGNOSIS — I25.119 CORONARY ARTERY DISEASE INVOLVING NATIVE CORONARY ARTERY OF NATIVE HEART WITH ANGINA PECTORIS: ICD-10-CM

## 2025-04-28 DIAGNOSIS — J84.112 IPF (IDIOPATHIC PULMONARY FIBROSIS) (HCC): ICD-10-CM

## 2025-04-28 DIAGNOSIS — M06.9 RHEUMATOID ARTHRITIS, INVOLVING UNSPECIFIED SITE, UNSPECIFIED WHETHER RHEUMATOID FACTOR PRESENT (HCC): ICD-10-CM

## 2025-04-28 DIAGNOSIS — E78.5 HYPERLIPIDEMIA, UNSPECIFIED HYPERLIPIDEMIA TYPE: ICD-10-CM

## 2025-04-28 DIAGNOSIS — G47.39 MIXED SLEEP APNEA: Primary | ICD-10-CM

## 2025-04-28 DIAGNOSIS — I10 PRIMARY HYPERTENSION: ICD-10-CM

## 2025-04-28 DIAGNOSIS — N18.31 STAGE 3A CHRONIC KIDNEY DISEASE (CKD) (HCC): ICD-10-CM

## 2025-04-28 DIAGNOSIS — Q62.39 UPJ OBSTRUCTION, CONGENITAL: ICD-10-CM

## 2025-04-28 DIAGNOSIS — G45.9 TIA (TRANSIENT ISCHEMIC ATTACK): ICD-10-CM

## 2025-04-28 PROBLEM — I63.9 CEREBROVASCULAR ACCIDENT (CVA) (HCC): Status: RESOLVED | Noted: 2025-03-19 | Resolved: 2025-04-28

## 2025-04-28 PROBLEM — G47.00 FREQUENT NOCTURNAL AWAKENING: Status: RESOLVED | Noted: 2021-05-26 | Resolved: 2025-04-28

## 2025-04-28 PROCEDURE — 1036F TOBACCO NON-USER: CPT | Performed by: INTERNAL MEDICINE

## 2025-04-28 PROCEDURE — 1126F AMNT PAIN NOTED NONE PRSNT: CPT | Performed by: INTERNAL MEDICINE

## 2025-04-28 PROCEDURE — G8420 CALC BMI NORM PARAMETERS: HCPCS | Performed by: INTERNAL MEDICINE

## 2025-04-28 PROCEDURE — 3074F SYST BP LT 130 MM HG: CPT | Performed by: INTERNAL MEDICINE

## 2025-04-28 PROCEDURE — 1123F ACP DISCUSS/DSCN MKR DOCD: CPT | Performed by: INTERNAL MEDICINE

## 2025-04-28 PROCEDURE — 99204 OFFICE O/P NEW MOD 45 MIN: CPT | Performed by: INTERNAL MEDICINE

## 2025-04-28 PROCEDURE — G2211 COMPLEX E/M VISIT ADD ON: HCPCS | Performed by: INTERNAL MEDICINE

## 2025-04-28 PROCEDURE — G8428 CUR MEDS NOT DOCUMENT: HCPCS | Performed by: INTERNAL MEDICINE

## 2025-04-28 PROCEDURE — 3079F DIAST BP 80-89 MM HG: CPT | Performed by: INTERNAL MEDICINE

## 2025-04-28 ASSESSMENT — ENCOUNTER SYMPTOMS
RECTAL PAIN: 0
EYE PAIN: 0
VOICE CHANGE: 0
STRIDOR: 0
CHOKING: 0

## 2025-04-28 NOTE — PROGRESS NOTES
03/28/2025 23.8  cm/s Final    Right CCA prox PSV 03/28/2025 88.3  cm/s Final    Right CCA prox EDV 03/28/2025 17.7  cm/s Final    Right ICA dist PSV 03/28/2025 69.0  cm/s Final    Right ICA dist EDV 03/28/2025 14.9  cm/s Final    Right ICA mid PSV 03/28/2025 143.0  cm/s Final    Right ICA mid EDV 03/28/2025 48.3  cm/s Final    Right ICA prox PSV 03/28/2025 126.0  cm/s Final    Right ICA prox EDV 03/28/2025 46.8  cm/s Final    Right ECA PSV 03/28/2025 64.6  cm/s Final    Right ECA EDV 03/28/2025 7.47  cm/s Final    Right vertebral PSV 03/28/2025 83.7  cm/s Final    Right vertebral EDV 03/28/2025 31.80  cm/s Final    Ao dist AP 03/28/2025 1.77  cm Final    Ao dist TR 03/28/2025 1.87  cm Final    Body Surface Area 03/28/2025 1.82  m2 Final    Right ICA/CCA PSV 03/28/2025 1.54   Final    Right arm BP 03/28/2025 112  mmHg Final    Left arm BP 03/28/2025 116  mmHg Final    Left ICA/CCA PSV 03/28/2025 0.81   Final         Assessent & Plan:        1. Mixed sleep apnea  Overview:  Patient has mixed central and obstructive sleep apnea.    Continue BiPap.    Has been followed by [facility].  2. TIA (transient ischemic attack)  Overview:  On 3/18/25 patient had transient left sided sensory changes.  EKG / telemetry / echocardiogram negative for cardiac source.  CTA head and neck and subsequent carotid dopplers bilateral heterogenous carotid plaque < 50-60% stenosis.  MRI brain negative acute CVA.  Evaluated by neurology and vascular surgery.  Plan indefinite DAPT and high intensity statin therapy.    3. IPF (idiopathic pulmonary fibrosis) ([facility])  Overview:  Minimal symptoms.  Evaluated / followed by [facility].  4. Stage 3a chronic kidney disease (CKD) ([facility])  Overview:  The patient was advised to avoid NSAIDs and other nephrotoxins.  Will dose adjust medications as appropriate.  Will monitor labs over time.     5. UPJ obstruction, congenital  Overview:  Followed by urology Dr. Foster.  Chronic left UPJ

## 2025-06-02 PROCEDURE — 93295 DEV INTERROG REMOTE 1/2/MLT: CPT | Performed by: INTERNAL MEDICINE

## 2025-06-02 PROCEDURE — 93296 REM INTERROG EVL PM/IDS: CPT | Performed by: INTERNAL MEDICINE

## 2025-06-18 ENCOUNTER — PREP FOR PROCEDURE (OUTPATIENT)
Dept: UROLOGY | Age: 80
End: 2025-06-18

## 2025-06-18 ENCOUNTER — TELEPHONE (OUTPATIENT)
Dept: UROLOGY | Age: 80
End: 2025-06-18

## 2025-06-18 ENCOUNTER — OFFICE VISIT (OUTPATIENT)
Dept: UROLOGY | Age: 80
End: 2025-06-18
Payer: MEDICARE

## 2025-06-18 DIAGNOSIS — Q62.39 UPJ OBSTRUCTION, CONGENITAL: Primary | ICD-10-CM

## 2025-06-18 DIAGNOSIS — I25.5 ISCHEMIC CARDIOMYOPATHY: ICD-10-CM

## 2025-06-18 DIAGNOSIS — N13.30 HYDRONEPHROSIS, UNSPECIFIED HYDRONEPHROSIS TYPE: Primary | ICD-10-CM

## 2025-06-18 DIAGNOSIS — Q62.39 UPJ OBSTRUCTION, CONGENITAL: ICD-10-CM

## 2025-06-18 LAB
BILIRUBIN, URINE, POC: NEGATIVE
BLOOD URINE, POC: NORMAL
GLUCOSE URINE, POC: NEGATIVE MG/DL
KETONES, URINE, POC: NEGATIVE MG/DL
LEUKOCYTE ESTERASE, URINE, POC: NEGATIVE
NITRITE, URINE, POC: NEGATIVE
PH, URINE, POC: 6 (ref 4.6–8)
PROTEIN,URINE, POC: NEGATIVE MG/DL
SPECIFIC GRAVITY, URINE, POC: 1.01 (ref 1–1.03)
URINALYSIS CLARITY, POC: NORMAL
URINALYSIS COLOR, POC: NORMAL
UROBILINOGEN, POC: NORMAL MG/DL

## 2025-06-18 PROCEDURE — 1036F TOBACCO NON-USER: CPT | Performed by: UROLOGY

## 2025-06-18 PROCEDURE — 1159F MED LIST DOCD IN RCRD: CPT | Performed by: UROLOGY

## 2025-06-18 PROCEDURE — G8427 DOCREV CUR MEDS BY ELIG CLIN: HCPCS | Performed by: UROLOGY

## 2025-06-18 PROCEDURE — G8420 CALC BMI NORM PARAMETERS: HCPCS | Performed by: UROLOGY

## 2025-06-18 PROCEDURE — 81003 URINALYSIS AUTO W/O SCOPE: CPT | Performed by: UROLOGY

## 2025-06-18 PROCEDURE — 99214 OFFICE O/P EST MOD 30 MIN: CPT | Performed by: UROLOGY

## 2025-06-18 PROCEDURE — 1123F ACP DISCUSS/DSCN MKR DOCD: CPT | Performed by: UROLOGY

## 2025-06-18 ASSESSMENT — ENCOUNTER SYMPTOMS
BACK PAIN: 0
NAUSEA: 0

## 2025-06-18 NOTE — PROGRESS NOTES
[facility]  [address] [place] [address]  [phone]    Terry Love  : 1945    Chief Complaint   Patient presents with    Follow-up        HPI     Terry Love is a 79 y.o. male with left hydronephrosis, left UPJ obstruction,managed with indwelling Tria stent. Takes Plavix for TIA.   In  had work-up due to shortness of breath.  Chest CT 23: IMPRESSION:  1. Areas of scattered subpleural fibrotic change at the lung bases, along the  lateral left major fissure and in the anterior medial upper lobes bilaterally.  No significant associated groundglass opacities. Areas of traction  bronchiectasis noted in these areas. No significant mucoid impactions, trait or  mass. No pleural effusions.     2. Probable massive left hydronephrosis where imaged through the upper abdomen.  Follow-up with urology suggested.revealed severe left hydronephrosis.  Patient's creatinine is slightly elevated at 1.45.  He was referred here for further evaluation.  We have seen patient in the past due to probable bilateral UPJ obstructions.  Its been over 4 years since his last MAG3 renal scan.  He does have pain in the back.  The the pain can be bilateral in nature but most days it is worse on the left than it is the right.  See previous history as below.     Previous urological history as below: copied from previous chart.      Initially he was Referred by ER., pt went to ER on 19 wth c/o right abdominal and right flank pain.   Had CT A/P w contrast: IMPRESSION:    1) There is perinephric fluid on the right with stranding densities in the  retroperitoneum. Etiology is not clear. 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  2) Additional findings include trace left pleural effusion, evidence of  sternotomy and cardiac pacemaker, moderate size hiatal hernia, moderate  thoracolumbar scoliosis, thickening of the urinary bladder wall and sigmoid  diverticulosis

## 2025-06-18 NOTE — TELEPHONE ENCOUNTER
----- Message from Dr. Eduard Foster MD sent at 6/18/2025 10:37 AM EDT -----  Schedule cystoscopy, left stent exchange (Tria stent)  Takes Plavix, he can stay on this.   He is getting ready for routine follow up appt with his cardiologist, Dr. Buchanan .

## 2025-06-25 NOTE — TELEPHONE ENCOUNTER
Procedures: Procedure(s):   CYSTOSCOPY, LEFT STENT EXCHANGE (TRIA STENT)   Date: 7/22/2025   Time: 1040   Location: [facility] MAIN OR 01 CYSTO

## 2025-06-25 NOTE — PROGRESS NOTES
[facility] CARDIOLOGY  [address] [place] [address], [address]  [place] [address]  PHONE: [phone]      25    NAME:  Terry Love  : 1945  MRN: [id number]         SUBJECTIVE:   Terry Love is a 80 y.o. male seen for a follow up visit regarding the following:     Chief Complaint   Patient presents with    Follow-up    Coronary Artery Disease    Cardiomyopathy            HPI:  Follow up  Follow-up, Coronary Artery Disease, and Cardiomyopathy   .    Follow up CAD/CABG with ischemic CM, biventricular ICD, hyperlipidemia, hypertension, orthostatic hypotension.  Rheumatoid arthritis. Prior TIA .  He says he feels fine, his wife notes he's seemed more off balance lately.  Hasn't had falls but seems he could.  He has poor energy, has been chronic.  He has mild normocytic anemia.  Doesn't recall when he last had colonoscopy, at least several years.  He remains pretty active in spite of it.  No melena or hematochezia, no chest discomfort.  Recent MRI brain with age related atrophy, no acute findings.  He continues to struggle with the UPJ stent he will have to get changed out soon.    I notice during our conversation that he appears quite pale.  Neither he nor his wife have particularly noticed it.           Past cardiac history:   KEVIN - on biPAP   Dec 2014 - LIMA to LAD, SVG to Dx, OM and PDA, EF 50%   Oct 2015 - EF 40-45%, normal valves, RVSP - highest tolerated doses beta blocker, ARB   2016 - EF 40-45%, RVSP 30, mildly dilated RV   Stress MPI with Lexiscan - inferior scar with jorge infarct ischemia, complicated by subdiaphragmatic artifact, EF 48%,   I personally reviewed images, SDS 2, significant subdiaphragmatic artifact , EF unchanged from baseline.     May 2018 - atretic LIMA to patent prox to mid LAD, 95% apical stenosis, patent SVG to OM, Dx and PDA   2018 - EF 25-30%, moderate MR, RVSP 27 (EF down from 40-45%)   Aug 2018 - [place] biventricular ICD   2019- 30-35% mild MR

## 2025-06-27 ENCOUNTER — OFFICE VISIT (OUTPATIENT)
Age: 80
End: 2025-06-27
Payer: MEDICARE

## 2025-06-27 VITALS
DIASTOLIC BLOOD PRESSURE: 62 MMHG | BODY MASS INDEX: 21.24 KG/M2 | SYSTOLIC BLOOD PRESSURE: 120 MMHG | HEART RATE: 92 BPM | WEIGHT: 148 LBS

## 2025-06-27 DIAGNOSIS — I25.118 CORONARY ARTERY DISEASE OF NATIVE ARTERY OF NATIVE HEART WITH STABLE ANGINA PECTORIS: ICD-10-CM

## 2025-06-27 DIAGNOSIS — R53.83 OTHER FATIGUE: ICD-10-CM

## 2025-06-27 DIAGNOSIS — R23.1 PALLOR: ICD-10-CM

## 2025-06-27 DIAGNOSIS — I50.32 CHRONIC HEART FAILURE WITH PRESERVED EJECTION FRACTION (HCC): ICD-10-CM

## 2025-06-27 DIAGNOSIS — J84.112 IPF (IDIOPATHIC PULMONARY FIBROSIS) (HCC): ICD-10-CM

## 2025-06-27 DIAGNOSIS — I50.22 CHRONIC HFREF (HEART FAILURE WITH REDUCED EJECTION FRACTION) (HCC): Primary | ICD-10-CM

## 2025-06-27 DIAGNOSIS — Z95.1 S/P CABG (CORONARY ARTERY BYPASS GRAFT): ICD-10-CM

## 2025-06-27 DIAGNOSIS — I25.5 ISCHEMIC CARDIOMYOPATHY: ICD-10-CM

## 2025-06-27 DIAGNOSIS — D50.8 OTHER IRON DEFICIENCY ANEMIA: ICD-10-CM

## 2025-06-27 LAB
ERYTHROCYTE [DISTWIDTH] IN BLOOD BY AUTOMATED COUNT: 13.2 % (ref 11.9–14.6)
FERRITIN SERPL-MCNC: 221 NG/ML (ref 8–388)
HCT VFR BLD AUTO: 37 % (ref 41.1–50.3)
HGB BLD-MCNC: 12 G/DL (ref 13.6–17.2)
HGB RETIC QN AUTO: 36 PG (ref 29–35)
IMM RETICS NFR: 6.3 % (ref 2.3–13.4)
IRON SATN MFR SERPL: 31 % (ref 20–50)
IRON SERPL-MCNC: 88 UG/DL (ref 35–100)
LDH SERPL L TO P-CCNC: 242 U/L (ref 127–281)
MCH RBC QN AUTO: 32 PG (ref 26.1–32.9)
MCHC RBC AUTO-ENTMCNC: 32.4 G/DL (ref 31.4–35)
MCV RBC AUTO: 98.7 FL (ref 82–102)
NRBC # BLD: 0 K/UL (ref 0–0.2)
PLATELET # BLD AUTO: 142 K/UL (ref 150–450)
PMV BLD AUTO: 9.2 FL (ref 9.4–12.3)
RBC # BLD AUTO: 3.75 M/UL (ref 4.23–5.6)
RETICS # AUTO: 0.04 M/UL (ref 0.03–0.1)
RETICS/RBC NFR AUTO: 1 % (ref 0.3–2)
TIBC SERPL-MCNC: 284 UG/DL (ref 240–450)
UIBC SERPL-MCNC: 196 UG/DL (ref 112–347)
WBC # BLD AUTO: 6.2 K/UL (ref 4.3–11.1)

## 2025-06-27 PROCEDURE — G8420 CALC BMI NORM PARAMETERS: HCPCS | Performed by: INTERNAL MEDICINE

## 2025-06-27 PROCEDURE — 1126F AMNT PAIN NOTED NONE PRSNT: CPT | Performed by: INTERNAL MEDICINE

## 2025-06-27 PROCEDURE — 1123F ACP DISCUSS/DSCN MKR DOCD: CPT | Performed by: INTERNAL MEDICINE

## 2025-06-27 PROCEDURE — G8428 CUR MEDS NOT DOCUMENT: HCPCS | Performed by: INTERNAL MEDICINE

## 2025-06-27 PROCEDURE — 1036F TOBACCO NON-USER: CPT | Performed by: INTERNAL MEDICINE

## 2025-06-27 PROCEDURE — 3074F SYST BP LT 130 MM HG: CPT | Performed by: INTERNAL MEDICINE

## 2025-06-27 PROCEDURE — 3078F DIAST BP <80 MM HG: CPT | Performed by: INTERNAL MEDICINE

## 2025-06-27 PROCEDURE — 99214 OFFICE O/P EST MOD 30 MIN: CPT | Performed by: INTERNAL MEDICINE

## 2025-06-27 ASSESSMENT — ENCOUNTER SYMPTOMS: SHORTNESS OF BREATH: 1

## 2025-06-30 LAB — HAPTOGLOB SERPL-MCNC: 51 MG/DL (ref 30–200)

## 2025-07-01 ENCOUNTER — OFFICE VISIT (OUTPATIENT)
Dept: NEUROLOGY | Age: 80
End: 2025-07-01
Payer: MEDICARE

## 2025-07-01 ENCOUNTER — ANESTHESIA EVENT (OUTPATIENT)
Dept: SURGERY | Age: 80
End: 2025-07-01
Payer: MEDICARE

## 2025-07-01 VITALS
SYSTOLIC BLOOD PRESSURE: 120 MMHG | HEART RATE: 75 BPM | WEIGHT: 146.4 LBS | OXYGEN SATURATION: 96 % | DIASTOLIC BLOOD PRESSURE: 80 MMHG | BODY MASS INDEX: 20.96 KG/M2 | HEIGHT: 70 IN

## 2025-07-01 DIAGNOSIS — I65.23 CAROTID STENOSIS, ASYMPTOMATIC, BILATERAL: ICD-10-CM

## 2025-07-01 DIAGNOSIS — R26.81 UNSTEADY GAIT WHEN WALKING: ICD-10-CM

## 2025-07-01 DIAGNOSIS — Z86.73 HISTORY OF TIA (TRANSIENT ISCHEMIC ATTACK): Primary | ICD-10-CM

## 2025-07-01 DIAGNOSIS — I10 HTN, GOAL BELOW 140/80: ICD-10-CM

## 2025-07-01 DIAGNOSIS — G89.29 CHRONIC MIDLINE LOW BACK PAIN WITHOUT SCIATICA: ICD-10-CM

## 2025-07-01 DIAGNOSIS — M54.50 CHRONIC MIDLINE LOW BACK PAIN WITHOUT SCIATICA: ICD-10-CM

## 2025-07-01 DIAGNOSIS — G47.33 OSA TREATED WITH BIPAP: ICD-10-CM

## 2025-07-01 PROCEDURE — 1036F TOBACCO NON-USER: CPT | Performed by: NURSE PRACTITIONER

## 2025-07-01 PROCEDURE — 1159F MED LIST DOCD IN RCRD: CPT | Performed by: NURSE PRACTITIONER

## 2025-07-01 PROCEDURE — 3079F DIAST BP 80-89 MM HG: CPT | Performed by: NURSE PRACTITIONER

## 2025-07-01 PROCEDURE — G8420 CALC BMI NORM PARAMETERS: HCPCS | Performed by: NURSE PRACTITIONER

## 2025-07-01 PROCEDURE — 99214 OFFICE O/P EST MOD 30 MIN: CPT | Performed by: NURSE PRACTITIONER

## 2025-07-01 PROCEDURE — G8427 DOCREV CUR MEDS BY ELIG CLIN: HCPCS | Performed by: NURSE PRACTITIONER

## 2025-07-01 PROCEDURE — 1160F RVW MEDS BY RX/DR IN RCRD: CPT | Performed by: NURSE PRACTITIONER

## 2025-07-01 PROCEDURE — 3074F SYST BP LT 130 MM HG: CPT | Performed by: NURSE PRACTITIONER

## 2025-07-01 PROCEDURE — 1123F ACP DISCUSS/DSCN MKR DOCD: CPT | Performed by: NURSE PRACTITIONER

## 2025-07-01 PROCEDURE — 1126F AMNT PAIN NOTED NONE PRSNT: CPT | Performed by: NURSE PRACTITIONER

## 2025-07-01 ASSESSMENT — PATIENT HEALTH QUESTIONNAIRE - PHQ9
SUM OF ALL RESPONSES TO PHQ QUESTIONS 1-9: 0
1. LITTLE INTEREST OR PLEASURE IN DOING THINGS: NOT AT ALL
2. FEELING DOWN, DEPRESSED OR HOPELESS: NOT AT ALL
SUM OF ALL RESPONSES TO PHQ QUESTIONS 1-9: 0

## 2025-07-01 ASSESSMENT — ENCOUNTER SYMPTOMS
EYES NEGATIVE: 1
VOICE CHANGE: 0
APNEA: 1
ALLERGIC/IMMUNOLOGIC NEGATIVE: 1
TROUBLE SWALLOWING: 0
GASTROINTESTINAL NEGATIVE: 1

## 2025-07-01 NOTE — PROGRESS NOTES
[facility] Neurology [address], [address]  [place] [address]  [phone]      Chief Complaint   Patient presents with    Follow-up     TIA       Terry Love is a 80 y.o. male who presents for hospital follow up for TIA.      PMH significant for CAD, STEMI, CABG x4 vessels, HFrEF s/p ICD, HTN, HLD, CKD, sleep apnea, carotid stenosis, TIA    Interval history:   He is here today by himself. No complaints. Chronic [place] and intermittent gait imbalance due to chronic low back pain.  Denies falls,weakness, sensory changes, vertigo, speech or swallowing difficulties. He is independent of his ADLs. He is currently on DAPT and atorvastatin for secondary stroke prevention. Tolerating medications without side effects.    Denies depression or SI.     Hx of KEVIN and central sleep apnea-- he is compliant with BiPAP nightly.     Hx of bilateral carotid stenosis- followed by vascular surgery, currently on DAPT and plans for repeat CUS.     Past Medical History:   Diagnosis Date    Anemia     Asthma     CAD (coronary artery disease) 2014    STEMI-CABG 4 vessels; followed by Dr. Lira    CHF (congestive heart failure) ([facility]ly [facility])     Chronic pain     lower back pain     Decreased cardiac ejection fraction     Dyspnea     mild restrictive defect per spirometry 4/24/23    GERD (gastroesophageal reflux disease)     managed with medication as needed     Heart failure ([facility])     echo 11/3/22 EF 30-35%    Hiatal hernia     [place] (hard of hearing)     hearing aides    Hyperlipidemia     Hypertension     ICD (implantable cardioverter-defibrillator) in place     [place] 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

## 2025-07-01 NOTE — PERIOP NOTE
Patient verified name and .  Order for consent  was found in EHR and does match case posting; patient verifies procedure.   Type 1B surgery, phone assessment complete.  Orders  received.  Labs per surgeon: UA, urine culture, CBC with diff s/h for PAT.  Patient will come to [address] [place] [address], [address] either -. Lab hours Monday - Friday 8 am - 3;30 pm,  chart flagged for charge RN  Labs per anesthesia protocol: Hgb 12.0 on 25    Patient has a [place] [place] ICD, patient denies any shocks, last interrogation 25- not pacer dependent, echo 3/19/25 EF 40-45%, EKG 3/19/25, last cardiology visit 25.  ICD added to case posting. TIA 3/2025 -Chart placed for anesthesia review.       Patient answered medical/surgical history questions at their best of ability. All prior to admission medications documented in EPIC.  Patient instructed to continue taking all prescription medications up to the day of surgery but to take only the following medications the day of surgery according to anesthesia guidelines with a small sip of water: tramadol if needed, aspirin 81 mg, albuterol inhaler, metoprolol, Entresto.       Patient stated \"I think I'm to stay on Plavix.\"  Per 25 Dr. Foster \"takes plavix, he can stay on this.\"     Also, patient is requested to take 2 Tylenol in the morning and then again before bed on the day before surgery. Regular or extra strength may be used.       Patient informed that all vitamins and supplements should be held 7 days prior to surgery and NSAIDS 5 days prior to surgery. Prescription meds to hold: none    Patient instructed on the following:  > Bring nitroglycerin and albuterol inhaler to the hospital  > Arrive at Main Entrance, time of arrival to be called the day before by 1700  > No food after midnight, patient may drink clear liquids up until 2 hours prior to arrival. No gum, candy, mints.   > Responsible adult must drive patient to the hospital, stay during surgery, and

## 2025-07-22 ENCOUNTER — HOSPITAL ENCOUNTER (OUTPATIENT)
Age: 80
Setting detail: OUTPATIENT SURGERY
Discharge: HOME | End: 2025-07-22
Attending: UROLOGY | Admitting: UROLOGY
Payer: MEDICARE

## 2025-07-22 ENCOUNTER — ANESTHESIA (OUTPATIENT)
Dept: SURGERY | Age: 80
End: 2025-07-22
Payer: MEDICARE

## 2025-07-22 VITALS
HEART RATE: 65 BPM | RESPIRATION RATE: 15 BRPM | TEMPERATURE: 97.9 F | BODY MASS INDEX: 21.19 KG/M2 | DIASTOLIC BLOOD PRESSURE: 57 MMHG | SYSTOLIC BLOOD PRESSURE: 116 MMHG | WEIGHT: 148 LBS | HEIGHT: 70 IN | OXYGEN SATURATION: 98 %

## 2025-07-22 LAB
APPEARANCE UR: CLEAR
BASOPHILS # BLD: 0.06 K/UL (ref 0–0.2)
BASOPHILS NFR BLD: 1.1 % (ref 0–2)
BILIRUB UR QL: NEGATIVE
COLOR UR: NORMAL
DIFFERENTIAL METHOD BLD: ABNORMAL
EOSINOPHIL # BLD: 0.3 K/UL (ref 0–0.8)
EOSINOPHIL NFR BLD: 5.4 % (ref 0.5–7.8)
ERYTHROCYTE [DISTWIDTH] IN BLOOD BY AUTOMATED COUNT: 13.2 % (ref 11.9–14.6)
GLUCOSE UR STRIP.AUTO-MCNC: NEGATIVE MG/DL
HCT VFR BLD AUTO: 39.3 % (ref 41.1–50.3)
HGB BLD-MCNC: 12.7 G/DL (ref 13.6–17.2)
HGB UR QL STRIP: NEGATIVE
IMM GRANULOCYTES # BLD AUTO: 0.01 K/UL (ref 0–0.5)
IMM GRANULOCYTES NFR BLD AUTO: 0.2 % (ref 0–5)
KETONES UR QL STRIP.AUTO: NEGATIVE MG/DL
LEUKOCYTE ESTERASE UR QL STRIP.AUTO: NEGATIVE
LYMPHOCYTES # BLD: 1.2 K/UL (ref 0.5–4.6)
LYMPHOCYTES NFR BLD: 21.6 % (ref 13–44)
MCH RBC QN AUTO: 31.8 PG (ref 26.1–32.9)
MCHC RBC AUTO-ENTMCNC: 32.3 G/DL (ref 31.4–35)
MCV RBC AUTO: 98.3 FL (ref 82–102)
MONOCYTES # BLD: 0.65 K/UL (ref 0.1–1.3)
MONOCYTES NFR BLD: 11.7 % (ref 4–12)
NEUTS SEG # BLD: 3.34 K/UL (ref 1.7–8.2)
NEUTS SEG NFR BLD: 60 % (ref 43–78)
NITRITE UR QL STRIP.AUTO: NEGATIVE
NRBC # BLD: 0 K/UL (ref 0–0.2)
PH UR STRIP: 6 (ref 5–9)
PLATELET # BLD AUTO: 122 K/UL (ref 150–450)
PMV BLD AUTO: 8.2 FL (ref 9.4–12.3)
PROT UR STRIP-MCNC: NEGATIVE MG/DL
RBC # BLD AUTO: 4 M/UL (ref 4.23–5.6)
SP GR UR REFRACTOMETRY: 1.01 (ref 1–1.02)
UROBILINOGEN UR QL STRIP.AUTO: 1 EU/DL (ref 0.2–1)
WBC # BLD AUTO: 5.6 K/UL (ref 4.3–11.1)

## 2025-07-22 PROCEDURE — 87086 URINE CULTURE/COLONY COUNT: CPT

## 2025-07-22 PROCEDURE — 7100000001 HC PACU RECOVERY - ADDTL 15 MIN: Performed by: UROLOGY

## 2025-07-22 PROCEDURE — 2720000010 HC SURG SUPPLY STERILE: Performed by: UROLOGY

## 2025-07-22 PROCEDURE — 3600000014 HC SURGERY LEVEL 4 ADDTL 15MIN: Performed by: UROLOGY

## 2025-07-22 PROCEDURE — 6370000000 HC RX 637 (ALT 250 FOR IP): Performed by: ANESTHESIOLOGY

## 2025-07-22 PROCEDURE — 3600000004 HC SURGERY LEVEL 4 BASE: Performed by: UROLOGY

## 2025-07-22 PROCEDURE — 7100000011 HC PHASE II RECOVERY - ADDTL 15 MIN: Performed by: UROLOGY

## 2025-07-22 PROCEDURE — 2580000003 HC RX 258: Performed by: NURSE ANESTHETIST, CERTIFIED REGISTERED

## 2025-07-22 PROCEDURE — 2709999900 HC NON-CHARGEABLE SUPPLY: Performed by: UROLOGY

## 2025-07-22 PROCEDURE — 81003 URINALYSIS AUTO W/O SCOPE: CPT

## 2025-07-22 PROCEDURE — 7100000000 HC PACU RECOVERY - FIRST 15 MIN: Performed by: UROLOGY

## 2025-07-22 PROCEDURE — 6360000002 HC RX W HCPCS: Performed by: UROLOGY

## 2025-07-22 PROCEDURE — 6360000002 HC RX W HCPCS: Performed by: NURSE ANESTHETIST, CERTIFIED REGISTERED

## 2025-07-22 PROCEDURE — 52332 CYSTOSCOPY AND TREATMENT: CPT | Performed by: UROLOGY

## 2025-07-22 PROCEDURE — 7100000010 HC PHASE II RECOVERY - FIRST 15 MIN: Performed by: UROLOGY

## 2025-07-22 PROCEDURE — 85025 COMPLETE CBC W/AUTO DIFF WBC: CPT

## 2025-07-22 PROCEDURE — 3700000001 HC ADD 15 MINUTES (ANESTHESIA): Performed by: UROLOGY

## 2025-07-22 PROCEDURE — C2617 STENT, NON-COR, TEM W/O DEL: HCPCS | Performed by: UROLOGY

## 2025-07-22 PROCEDURE — 2580000003 HC RX 258: Performed by: ANESTHESIOLOGY

## 2025-07-22 PROCEDURE — 2500000003 HC RX 250 WO HCPCS: Performed by: NURSE ANESTHETIST, CERTIFIED REGISTERED

## 2025-07-22 PROCEDURE — C1769 GUIDE WIRE: HCPCS | Performed by: UROLOGY

## 2025-07-22 PROCEDURE — 3700000000 HC ANESTHESIA ATTENDED CARE: Performed by: UROLOGY

## 2025-07-22 DEVICE — URETERAL STENT WITH SIDE HOLES 7FX26CM
Type: IMPLANTABLE DEVICE | Site: URETER | Status: FUNCTIONAL
Brand: TRIA™ SOFT

## 2025-07-22 RX ORDER — EPHEDRINE SULFATE 5 MG/ML
INJECTION INTRAVENOUS
Status: DISCONTINUED | OUTPATIENT
Start: 2025-07-22 | End: 2025-07-22 | Stop reason: SDUPTHER

## 2025-07-22 RX ORDER — SODIUM CHLORIDE 0.9 % (FLUSH) 0.9 %
5-40 SYRINGE (ML) INJECTION EVERY 12 HOURS SCHEDULED
Status: DISCONTINUED | OUTPATIENT
Start: 2025-07-22 | End: 2025-07-22 | Stop reason: HOSPADM

## 2025-07-22 RX ORDER — FENTANYL CITRATE 50 UG/ML
100 INJECTION, SOLUTION INTRAMUSCULAR; INTRAVENOUS
Status: DISCONTINUED | OUTPATIENT
Start: 2025-07-22 | End: 2025-07-22 | Stop reason: HOSPADM

## 2025-07-22 RX ORDER — ACETAMINOPHEN 500 MG
1000 TABLET ORAL ONCE
Status: COMPLETED | OUTPATIENT
Start: 2025-07-22 | End: 2025-07-22

## 2025-07-22 RX ORDER — ONDANSETRON 2 MG/ML
4 INJECTION INTRAMUSCULAR; INTRAVENOUS
Status: DISCONTINUED | OUTPATIENT
Start: 2025-07-22 | End: 2025-07-22 | Stop reason: HOSPADM

## 2025-07-22 RX ORDER — OXYCODONE HYDROCHLORIDE 5 MG/1
5 TABLET ORAL
Status: DISCONTINUED | OUTPATIENT
Start: 2025-07-22 | End: 2025-07-22 | Stop reason: HOSPADM

## 2025-07-22 RX ORDER — SODIUM CHLORIDE 0.9 % (FLUSH) 0.9 %
5-40 SYRINGE (ML) INJECTION PRN
Status: DISCONTINUED | OUTPATIENT
Start: 2025-07-22 | End: 2025-07-22 | Stop reason: HOSPADM

## 2025-07-22 RX ORDER — SODIUM CHLORIDE 9 MG/ML
INJECTION, SOLUTION INTRAVENOUS PRN
Status: DISCONTINUED | OUTPATIENT
Start: 2025-07-22 | End: 2025-07-22 | Stop reason: HOSPADM

## 2025-07-22 RX ORDER — HYDRALAZINE HYDROCHLORIDE 20 MG/ML
10 INJECTION INTRAMUSCULAR; INTRAVENOUS
Status: DISCONTINUED | OUTPATIENT
Start: 2025-07-22 | End: 2025-07-22 | Stop reason: HOSPADM

## 2025-07-22 RX ORDER — SODIUM CHLORIDE, SODIUM LACTATE, POTASSIUM CHLORIDE, CALCIUM CHLORIDE 600; 310; 30; 20 MG/100ML; MG/100ML; MG/100ML; MG/100ML
INJECTION, SOLUTION INTRAVENOUS CONTINUOUS
Status: DISCONTINUED | OUTPATIENT
Start: 2025-07-22 | End: 2025-07-22 | Stop reason: HOSPADM

## 2025-07-22 RX ORDER — FENTANYL CITRATE 50 UG/ML
INJECTION, SOLUTION INTRAMUSCULAR; INTRAVENOUS
Status: DISCONTINUED | OUTPATIENT
Start: 2025-07-22 | End: 2025-07-22 | Stop reason: SDUPTHER

## 2025-07-22 RX ORDER — MIDAZOLAM HYDROCHLORIDE 2 MG/2ML
2 INJECTION, SOLUTION INTRAMUSCULAR; INTRAVENOUS
Status: DISCONTINUED | OUTPATIENT
Start: 2025-07-22 | End: 2025-07-22 | Stop reason: HOSPADM

## 2025-07-22 RX ORDER — LIDOCAINE HYDROCHLORIDE 20 MG/ML
INJECTION, SOLUTION EPIDURAL; INFILTRATION; INTRACAUDAL; PERINEURAL
Status: DISCONTINUED | OUTPATIENT
Start: 2025-07-22 | End: 2025-07-22 | Stop reason: SDUPTHER

## 2025-07-22 RX ORDER — LABETALOL HYDROCHLORIDE 5 MG/ML
10 INJECTION, SOLUTION INTRAVENOUS
Status: DISCONTINUED | OUTPATIENT
Start: 2025-07-22 | End: 2025-07-22 | Stop reason: HOSPADM

## 2025-07-22 RX ORDER — PROPOFOL 10 MG/ML
INJECTION, EMULSION INTRAVENOUS
Status: DISCONTINUED | OUTPATIENT
Start: 2025-07-22 | End: 2025-07-22 | Stop reason: SDUPTHER

## 2025-07-22 RX ORDER — LIDOCAINE HYDROCHLORIDE 10 MG/ML
1 INJECTION, SOLUTION INFILTRATION; PERINEURAL
Status: DISCONTINUED | OUTPATIENT
Start: 2025-07-22 | End: 2025-07-22 | Stop reason: HOSPADM

## 2025-07-22 RX ORDER — ETOMIDATE 2 MG/ML
INJECTION INTRAVENOUS
Status: DISCONTINUED | OUTPATIENT
Start: 2025-07-22 | End: 2025-07-22 | Stop reason: SDUPTHER

## 2025-07-22 RX ORDER — PROCHLORPERAZINE EDISYLATE 5 MG/ML
5 INJECTION INTRAMUSCULAR; INTRAVENOUS
Status: DISCONTINUED | OUTPATIENT
Start: 2025-07-22 | End: 2025-07-22 | Stop reason: HOSPADM

## 2025-07-22 RX ORDER — SODIUM CHLORIDE, SODIUM LACTATE, POTASSIUM CHLORIDE, CALCIUM CHLORIDE 600; 310; 30; 20 MG/100ML; MG/100ML; MG/100ML; MG/100ML
INJECTION, SOLUTION INTRAVENOUS
Status: DISCONTINUED | OUTPATIENT
Start: 2025-07-22 | End: 2025-07-22 | Stop reason: SDUPTHER

## 2025-07-22 RX ADMIN — PROPOFOL 75 MG: 10 INJECTION, EMULSION INTRAVENOUS at 10:53

## 2025-07-22 RX ADMIN — FENTANYL CITRATE 25 MCG: 50 INJECTION, SOLUTION INTRAMUSCULAR; INTRAVENOUS at 11:10

## 2025-07-22 RX ADMIN — FENTANYL CITRATE 25 MCG: 50 INJECTION, SOLUTION INTRAMUSCULAR; INTRAVENOUS at 11:20

## 2025-07-22 RX ADMIN — FENTANYL CITRATE 25 MCG: 50 INJECTION, SOLUTION INTRAMUSCULAR; INTRAVENOUS at 10:51

## 2025-07-22 RX ADMIN — ETOMIDATE 10 MG: 2 INJECTION, SOLUTION INTRAVENOUS at 10:53

## 2025-07-22 RX ADMIN — ACETAMINOPHEN 1000 MG: 500 TABLET, FILM COATED ORAL at 09:34

## 2025-07-22 RX ADMIN — EPHEDRINE SULFATE 10 MG: 5 INJECTION INTRAVENOUS at 11:02

## 2025-07-22 RX ADMIN — PHENYLEPHRINE HYDROCHLORIDE 100 MCG: 0.1 INJECTION, SOLUTION INTRAVENOUS at 10:54

## 2025-07-22 RX ADMIN — PHENYLEPHRINE HYDROCHLORIDE 100 MCG: 0.1 INJECTION, SOLUTION INTRAVENOUS at 11:02

## 2025-07-22 RX ADMIN — SODIUM CHLORIDE, SODIUM LACTATE, POTASSIUM CHLORIDE, AND CALCIUM CHLORIDE: 600; 310; 30; 20 INJECTION, SOLUTION INTRAVENOUS at 10:42

## 2025-07-22 RX ADMIN — SODIUM CHLORIDE, SODIUM LACTATE, POTASSIUM CHLORIDE, AND CALCIUM CHLORIDE: .6; .31; .03; .02 INJECTION, SOLUTION INTRAVENOUS at 09:35

## 2025-07-22 RX ADMIN — Medication 2000 MG: at 10:56

## 2025-07-22 RX ADMIN — FENTANYL CITRATE 25 MCG: 50 INJECTION, SOLUTION INTRAMUSCULAR; INTRAVENOUS at 11:00

## 2025-07-22 RX ADMIN — LIDOCAINE HYDROCHLORIDE 100 MG: 20 INJECTION, SOLUTION EPIDURAL; INFILTRATION; INTRACAUDAL; PERINEURAL at 10:51

## 2025-07-22 ASSESSMENT — PAIN - FUNCTIONAL ASSESSMENT
PAIN_FUNCTIONAL_ASSESSMENT: NONE - DENIES PAIN
PAIN_FUNCTIONAL_ASSESSMENT: 0-10
PAIN_FUNCTIONAL_ASSESSMENT: NONE - DENIES PAIN
PAIN_FUNCTIONAL_ASSESSMENT: NONE - DENIES PAIN

## 2025-07-22 ASSESSMENT — ENCOUNTER SYMPTOMS
DYSPNEA ACTIVITY LEVEL: AFTER AMBULATING 1 FLIGHT OF STAIRS
SHORTNESS OF BREATH: 1

## 2025-07-22 NOTE — OP NOTE
[address]  [address]                            OPERATIVE REPORT      PATIENT NAME: KRYSTAL MELENDEZ          : 1945  MED REC NO: [id number]                       ROOM: [facility] NO: [id number]                       ADMIT DATE: 2025  PROVIDER: Eduard Foster Jr, MD    DATE OF SERVICE:  2025    PREOPERATIVE DIAGNOSES:  Left ureteropelvic junction obstruction.    POSTOPERATIVE DIAGNOSES:  Left ureteropelvic junction obstruction.    PROCEDURES PERFORMED:  Cystoscopy with exchange of left ureteral stent.    SURGEON:  Eduard Foster Jr, MD    ASSISTANT:  None.    ANESTHESIA:  General.    ESTIMATED BLOOD LOSS:  None.    SPECIMENS REMOVED:  None.    INTRAOPERATIVE FINDINGS:  Successful exchange of left ureteral stent.     COMPLICATIONS:  None.    IMPLANTS:  Left ureteral stent.    INDICATIONS:  The patient with symptomatic left ureteropelvic junction obstruction, which is being managed with an indwelling ureteral stent, here for stent exchange.    DESCRIPTION OF PROCEDURE:  The patient was given a general anesthetic, placed in the dorsal lithotomy position.  He was prepped and draped in a sterile fashion.  I passed a 22-Icelandic cystoscope in the urethra using video camera and 30-degree lens.  The anterior urethra was normal.  Prostate shows that he is status post TURP without obstruction.  Bladder shows no abnormalities.  There was stent protruding from the left ureteral orifice.  Fluoroscopy was used to identify the location of the stent.  The patient has severe scoliosis.  The left proximal portion of the stent seems to be in good position.    The distal end of the stent was grasped with grasping forceps and was pulled out of the urethral meatus.  I passed a 0.038 floppy-tipped guidewire up through the lumen of the stent.  This was passed up into the left kidney area and the stent was removed.  The stent did not show any

## 2025-07-22 NOTE — ANESTHESIA PRE PROCEDURE
Anesthesia Plan      general     ASA 4     (R/B/I of GA discussed at length with pt.  Expresses understanding, agreeable to proceed.  Questions answered    LMA)  Induction: intravenous.    MIPS: Postoperative opioids intended and Prophylactic antiemetics administered.  Anesthetic plan and risks discussed with patient.      Plan discussed with CRNA.                    Patrick Ford,    7/22/2025

## 2025-07-22 NOTE — DISCHARGE INSTRUCTIONS
If you have had surgery in the past 7-10 days by one of our providers and are having fever, bleeding, or drainage from an incision, have an opening in an incision, or having issues urinating properly, please call [phone] during normal office hours. Please call [phone] for any immediate needs AFTER HOURS.     Ureteral Stent Placement: What to Expect at Home  Your Recovery  A ureteral (say \"you-REE-ter-ul\") stent is a thin, hollow tube that is placed in the ureter to help urine pass from the kidney into the bladder. Ureters are the tubes that connect the kidneys to the bladder.  You may have a small amount of blood in your urine for 1 to 3 days after the procedure.  While the stent is in place, you may have to urinate more often, feel a sudden need to urinate, or feel like you cannot completely empty your bladder. You may feel some pain when you urinate or do strenuous activity. You also may notice a small amount of blood in your urine after strenuous activities. These side effects usually do not prevent people from doing their normal daily activities. You may have a string attached to your stent. Do not to pull the string unless the doctor tells you to. The doctor will use the string to pull out the stent when you no longer need it.  After the procedure, urine may flow better from your kidneys to your bladder. A ureteral stent may be left in place for several days or for as long as several months. Your doctor will take it out when you no longer need it.    Cystoscopy: What to Expect at Home  Your Recovery  A cystoscopy is a procedure that lets a doctor look inside of the bladder and the urethra. The urethra is the tube that carries urine from the bladder to outside the body. The doctor uses a thin, lighted tube called a cystoscope to look for kidney or bladder stones, tumors, bleeding, or infection. After the cystoscopy, your urethra may be sore at first, and it may burn when you urinate for the first few

## 2025-07-22 NOTE — H&P
Terry Love  : 1945         Chief Complaint   Patient presents with    Follow-up         HPI      Terry Love is a 79 y.o. male with left hydronephrosis, left UPJ obstruction,managed with indwelling Tria stent. Takes Plavix for TIA.   In  had work-up due to shortness of breath.  Chest CT 23: IMPRESSION:  1. Areas of scattered subpleural fibrotic change at the lung bases, along the  lateral left major fissure and in the anterior medial upper lobes bilaterally.  No significant associated groundglass opacities. Areas of traction  bronchiectasis noted in these areas. No significant mucoid impactions, trait or  mass. No pleural effusions.     2. Probable massive left hydronephrosis where imaged through the upper abdomen.  Follow-up with urology suggested.revealed severe left hydronephrosis.  Patient's creatinine is slightly elevated at 1.45.  He was referred here for further evaluation.  We have seen patient in the past due to probable bilateral UPJ obstructions.  Its been over 4 years since his last MAG3 renal scan.  He does have pain in the back.  The the pain can be bilateral in nature but most days it is worse on the left than it is the right.  See previous history as below.     Previous urological history as below: copied from previous chart.      Initially he was Referred by ER., pt went to ER on 19 wth c/o right abdominal and right flank pain.   Had CT A/P w contrast: IMPRESSION:    1) There is perinephric fluid on the right with stranding densities in the  retroperitoneum. Etiology is not clear. 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  2) Additional findings include trace left pleural effusion, evidence of  sternotomy and cardiac pacemaker, moderate size hiatal hernia, moderate  thoracolumbar scoliosis, thickening of the urinary bladder wall and sigmoid  diverticulosis without definite acute inflammatory changes, bilateral L5  spondylolysis

## 2025-07-22 NOTE — BRIEF OP NOTE
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 [place] ICD    Surgeon(s):  Nixon Foster Jr., MD    Assistant:  * No surgical staff found *    Anesthesia: General    Estimated Blood Loss (mL): Minimal    Complications: None    Specimens:   * No specimens in log *    Implants:  Implant Name Type Inv. Item Serial No.  Lot No. LRB No. Used Action   STENT URETERAL 7 FRX26 CM SOFT MONOFILAMENT TRIA - [id number]  STENT URETERAL 7 FRX26 CM SOFT MONOFILAMENT TRIA  [employer] UROLOGY-WD [id number] Left 1 Implanted         Drains: * No LDAs found *    Findings:  Present At Time Of Surgery (PATOS) (choose all levels that have infection present):  No infection present  Other Findings: see op note    Electronically signed by NIXON FOSTER JR, MD on 7/22/2025 at 11:23 AM

## 2025-07-24 LAB
BACTERIA SPEC CULT: NORMAL
SERVICE CMNT-IMP: NORMAL

## 2025-07-30 ENCOUNTER — OFFICE VISIT (OUTPATIENT)
Age: 80
End: 2025-07-30

## 2025-07-30 VITALS
DIASTOLIC BLOOD PRESSURE: 80 MMHG | OXYGEN SATURATION: 100 % | WEIGHT: 148 LBS | RESPIRATION RATE: 20 BRPM | HEART RATE: 68 BPM | TEMPERATURE: 98 F | SYSTOLIC BLOOD PRESSURE: 130 MMHG | HEIGHT: 70 IN | BODY MASS INDEX: 21.19 KG/M2

## 2025-07-30 DIAGNOSIS — J84.9 ILD (INTERSTITIAL LUNG DISEASE) (HCC): Primary | ICD-10-CM

## 2025-07-30 DIAGNOSIS — M06.9 RHEUMATOID ARTHRITIS, INVOLVING UNSPECIFIED SITE, UNSPECIFIED WHETHER RHEUMATOID FACTOR PRESENT (HCC): ICD-10-CM

## 2025-07-30 DIAGNOSIS — J32.9 CHRONIC SINUSITIS, UNSPECIFIED LOCATION: ICD-10-CM

## 2025-07-30 PROBLEM — J84.10 PULMONARY FIBROSIS (HCC): Status: RESOLVED | Noted: 2024-01-04 | Resolved: 2025-07-30

## 2025-07-30 PROBLEM — J84.112 IPF (IDIOPATHIC PULMONARY FIBROSIS) (HCC): Status: RESOLVED | Noted: 2025-01-30 | Resolved: 2025-07-30

## 2025-07-30 RX ORDER — ALBUTEROL SULFATE 90 UG/1
2 INHALANT RESPIRATORY (INHALATION) 3 TIMES DAILY
Qty: 3 EACH | Refills: 3 | Status: SHIPPED | OUTPATIENT
Start: 2025-07-30

## 2025-07-30 NOTE — PROGRESS NOTES
Name:  Terry Love  YOB: 1945   MRN: [id number]      Office Visit: 7/30/2025       Assessment & Plan (Medical Decision Making)    Impression: 80 y.o. male with a history of likely rheumatoid arthritis related interstitial lung disease.  Significant dyspnea on exertion.  History also complicated by coronary artery disease and heart failure with reduced ejection fraction but this has improved to 40 to 45% on repeat echocardiograms.    -He seems somewhat limited in what treatments he would be accepting of.  He does not want any additional therapies for his rheumatoid arthritis.  - Will set up repeat complete PFTs and CT scan at his next appointment in a few months to see if there is clear sign of progression.  - If there is need to have a further conversation with him about Ofev which has been mentioned to him in the past that he has seemed leery of.  - Has a as needed albuterol inhaler but is not sure that this helps much.  - Reports chronic sinusitis and cough with mucus production.  Instructed him to begin taking over-the-counter Flonase for this.  If he does not improve with this we will consider starting him on an inhaled steroid as he does have an elevated eosinophil count on past CBCs.  -Walking sat check in office today: 95  Follow-up in 3 months    1. ILD (interstitial lung disease) (HCC)    - CT CHEST WO CONTRAST; Future    2. Rheumatoid arthritis, involving unspecified site, unspecified whether rheumatoid factor present (HCC)      3. Chronic sinusitis, unspecified location      Orders Placed This Encounter   Medications    albuterol sulfate HFA (VENTOLIN HFA) 108 (90 Base) MCG/ACT inhaler     Sig: Inhale 2 puffs into the lungs 3 times daily     Dispense:  3 each     Refill:  3     No orders of the defined types were placed in this encounter.    Follow-up and Dispositions    Return in about 3 months (around 10/30/2025) for cPFT's @ next appt, With Me or Jeana, CT chest before next

## 2025-08-07 ENCOUNTER — CLINICAL SUPPORT (OUTPATIENT)
Age: 80
End: 2025-08-07
Payer: MEDICARE

## 2025-08-07 DIAGNOSIS — J84.9 ILD (INTERSTITIAL LUNG DISEASE) (HCC): Primary | ICD-10-CM

## 2025-08-07 LAB
FEF25-27, POC: 2.61 L/S
FET, POC: NORMAL
FEV 1 , POC: 2.58 L
FEV1/FVC, POC: NORMAL
FVC, POC: NORMAL
LUNG AGE, POC: NORMAL
PEF, POC: 6.99 L/S

## 2025-08-07 PROCEDURE — 94726 PLETHYSMOGRAPHY LUNG VOLUMES: CPT | Performed by: INTERNAL MEDICINE

## 2025-08-07 PROCEDURE — 94729 DIFFUSING CAPACITY: CPT | Performed by: INTERNAL MEDICINE

## 2025-08-07 PROCEDURE — 94010 BREATHING CAPACITY TEST: CPT | Performed by: INTERNAL MEDICINE

## 2025-09-01 PROCEDURE — 93295 DEV INTERROG REMOTE 1/2/MLT: CPT | Performed by: INTERNAL MEDICINE

## 2025-09-01 PROCEDURE — 93296 REM INTERROG EVL PM/IDS: CPT | Performed by: INTERNAL MEDICINE

## (undated) DEVICE — GUIDEWIRE UROLOGICAL STR 3 CM 0.038 IN X150 CM FIX STIFF LF

## (undated) DEVICE — SOLUTION IRRIG 3000ML H2O STRL BAG

## (undated) DEVICE — PACK SURGICAL PROCEDURE KIT CYSTOSCOPY TOTE

## (undated) DEVICE — SOLUTION IRRIG 1000ML H2O STRL BLT

## (undated) DEVICE — CATH IV STR 20GX1IN PNK -- PROTECTIV PLUS

## (undated) DEVICE — Device

## (undated) DEVICE — CATHETER URET L70CM OD6FR OPN END FLEXIMA

## (undated) DEVICE — CATHETER URET 5FR L70CM TIP 8FR OPN END CONE TIP INJ HUB

## (undated) DEVICE — GARMENT,MEDLINE,DVT,INT,CALF,MED, GEN2: Brand: MEDLINE

## (undated) DEVICE — GUIDEWIRE UROLOGICAL STR 3 CM 0.038 INX150 CM DUAL-FLEX

## (undated) DEVICE — SNARE POLYP SM W13MMXL240CM SHTH DIA2.4MM OVL FLX DISP

## (undated) DEVICE — CATHETER DIAG 5FR L110CM PIG CRV SZ 6 SIDE H DBL BRAID WIRE

## (undated) DEVICE — KENDALL RADIOLUCENT FOAM MONITORING ELECTRODE RECTANGULAR SHAPE: Brand: KENDALL

## (undated) DEVICE — CANNULA NSL ORAL AD FOR CAPNOFLEX CO2 O2 AIRLFE

## (undated) DEVICE — NEEDLE HYPO 27GA L1.25IN GRY POLYPR HUB S STL REG BVL STR

## (undated) DEVICE — GOWN SURG 2XL 49 IN AAMI LEVEL 3 ORBIS

## (undated) DEVICE — GLOVE SURG SZ 8 CRM LTX FREE POLYISOPRENE POLYMER BEAD ANTI

## (undated) DEVICE — 3M™ TEGADERM™ TRANSPARENT FILM DRESSING FRAME STYLE, 1628, 6 IN X 8 IN (15 CM X 20 CM), 10/CT 8CT/CASE: Brand: 3M™ TEGADERM™

## (undated) DEVICE — CONSTELLATION VISION SYSTEM 5000 CPM ULTRAVIT PROBE STRAIGHT ENDOILLUMINATOR 25+ TOTALPLUS VITRECTOMY PAK EDGEPLUS BLADE VALVED ENTRY SYSTEM: Brand: CONSTELLATION, ULTRAVIT, 25+, TOTALPLUS, EDGEPLUS

## (undated) DEVICE — CANNULA ASPIR 25 GAX32 MM RETINAL LUER LCK HUB

## (undated) DEVICE — SOLUTION IRRIGATION BAL SALT SOLUTION 500 ML BTL 6/CA BSS +

## (undated) DEVICE — GUIDEWIRE ENDOSCP L150CM DIA0.038IN TIP L3CM PTFE FLX STR

## (undated) DEVICE — CARDINAL HEALTH FLEXIBLE LIGHT HANDLE COVER: Brand: CARDINAL HEALTH

## (undated) DEVICE — (D)STRIP SKN CLSR 0.5X4IN WHT --

## (undated) DEVICE — CATHETER DIAG 5FR L100CM LUMN ID0.047IN JR4 CRV 0 SIDE H

## (undated) DEVICE — SOLUTION IRRIG 1000ML STRL H2O USP PLAS POUR BTL

## (undated) DEVICE — IRRIGATION KT OPHTH 80IN --

## (undated) DEVICE — Z DISCONTINUED NO SUB IDED SHIELD EYE PLAS RT BGE M 7.5CMX5.7CM VISITEC

## (undated) DEVICE — BLOCK BITE AD 60FR W/ VELC STRP ADDRESSES MOST PT AND

## (undated) DEVICE — SURGICAL PROCEDURE PACK EYE CDS

## (undated) DEVICE — GOWN,REINFORCED,POLY,AURORA,XXLARGE,STR: Brand: MEDLINE

## (undated) DEVICE — DEVICE CLSR 5FR BIOABSRB FULL INTEGR RAP HEMSTAS FOR FEM

## (undated) DEVICE — SOLUTION IRRIG 1000ML H2O PIC PLAS SHATTERPROOF CONTAINER

## (undated) DEVICE — CATHETER DIAG 5FR L100CM LUMN ID0.047IN JL4 CRV 0 SIDE H

## (undated) DEVICE — (D)SYR 10ML 1/5ML GRAD NSAF -- PKGING CHANGE USE ITEM 338027

## (undated) DEVICE — PAD,EYE,1-5/8X2 5/8,STERILE,LF,1/PK: Brand: MEDLINE

## (undated) DEVICE — SYR 3ML LL TIP 1/10ML GRAD --

## (undated) DEVICE — 25+® REVOLUTION DSP ILM FORCEPS: Brand: ALCON GRIESHABER REVOLUTION 25+

## (undated) DEVICE — INTRODUCER SHTH 5FR CANN L11CM GWIRE 0.038IN STD KINK

## (undated) DEVICE — SOLUTION IRRIGATION BAL SALT SOLUTION 500 ML STRL BSS

## (undated) DEVICE — TRAP SPEC COLL POLYP POLYSTYR --

## (undated) DEVICE — CONNECTOR TBNG OD5-7MM O2 END DISP

## (undated) DEVICE — CATHETER URET 5FR L70CM OPN END SGL LUMN INJ HUB FLEXIMA

## (undated) DEVICE — MVR KNIFE 25 GAUGE: Brand: ALCON

## (undated) DEVICE — NITINOL STONE RETRIEVAL DEVICE: Brand: DAKOTA

## (undated) DEVICE — CONTAINER PREFIL FRMLN 40ML --

## (undated) DEVICE — NDL PRT INJ NSAF BLNT 18GX1.5 --

## (undated) DEVICE — BAG DRAIN UROLOGY GENESIS NS

## (undated) DEVICE — FORCEPS BX L240CM JAW DIA2.8MM L CAP W/ NDL MIC MESH TOOTH

## (undated) DEVICE — SYR 5ML 1/5 GRAD LL NSAF LF --